# Patient Record
Sex: MALE | Race: WHITE | NOT HISPANIC OR LATINO | Employment: FULL TIME | URBAN - METROPOLITAN AREA
[De-identification: names, ages, dates, MRNs, and addresses within clinical notes are randomized per-mention and may not be internally consistent; named-entity substitution may affect disease eponyms.]

---

## 2017-04-17 ENCOUNTER — HOSPITAL ENCOUNTER (EMERGENCY)
Facility: HOSPITAL | Age: 42
Discharge: HOME | End: 2017-04-17
Attending: EMERGENCY MEDICINE
Payer: MEDICAID

## 2017-04-17 VITALS
DIASTOLIC BLOOD PRESSURE: 111 MMHG | RESPIRATION RATE: 18 BRPM | WEIGHT: 225 LBS | HEART RATE: 105 BPM | SYSTOLIC BLOOD PRESSURE: 220 MMHG | HEIGHT: 74 IN | BODY MASS INDEX: 28.88 KG/M2 | OXYGEN SATURATION: 95 % | TEMPERATURE: 100 F

## 2017-04-17 DIAGNOSIS — J06.9 VIRAL URI: Primary | ICD-10-CM

## 2017-04-17 DIAGNOSIS — R11.10 POST-TUSSIVE EMESIS: ICD-10-CM

## 2017-04-17 PROCEDURE — 25000242 PHARM REV CODE 250 ALT 637 W/ HCPCS: Performed by: EMERGENCY MEDICINE

## 2017-04-17 PROCEDURE — 94640 AIRWAY INHALATION TREATMENT: CPT

## 2017-04-17 PROCEDURE — 94760 N-INVAS EAR/PLS OXIMETRY 1: CPT

## 2017-04-17 PROCEDURE — 99283 EMERGENCY DEPT VISIT LOW MDM: CPT | Mod: 25

## 2017-04-17 PROCEDURE — 25000003 PHARM REV CODE 250: Performed by: EMERGENCY MEDICINE

## 2017-04-17 RX ORDER — IBUPROFEN 400 MG/1
800 TABLET ORAL
Status: COMPLETED | OUTPATIENT
Start: 2017-04-17 | End: 2017-04-17

## 2017-04-17 RX ORDER — ALBUTEROL SULFATE 2.5 MG/.5ML
2.5 SOLUTION RESPIRATORY (INHALATION)
Status: COMPLETED | OUTPATIENT
Start: 2017-04-17 | End: 2017-04-17

## 2017-04-17 RX ADMIN — ALBUTEROL SULFATE 2.5 MG: 2.5 SOLUTION RESPIRATORY (INHALATION) at 02:04

## 2017-04-17 RX ADMIN — IBUPROFEN 800 MG: 400 TABLET, FILM COATED ORAL at 02:04

## 2017-04-17 NOTE — ED PROVIDER NOTES
Chief complaint:  Fever; Vomiting; and Cough      HPI:  Augustine Boswell is a 41 y.o. male presenting with a one-week history of cough productive of yellow, nonbloody sputum accompanied by rhinorrhea and occasional posttussive emesis.  No diarrhea.  No chest or abdominal pain.  Denies smoking history.  No recent travel or sick contacts.  He does admit to IV drug use.    ROS: As per HPI and below:  No measured fever, rashes, swelling, hemoptysis, chest pain, syncope, or back pain.    Review of patient's allergies indicates:  No Known Allergies    Patient's Medications   New Prescriptions    No medications on file   Previous Medications    ALBUTEROL 90 MCG/ACTUATION INHALER    Inhale 1 puff into the lungs every 4 (four) hours as needed for Wheezing.    IBUPROFEN (ADVIL,MOTRIN) 600 MG TABLET    Take 1 tablet (600 mg total) by mouth every 6 (six) hours as needed.    OMEPRAZOLE (PRILOSEC) 20 MG CAPSULE    Take 20 mg by mouth once daily.   Modified Medications    No medications on file   Discontinued Medications    No medications on file       PMH:  As per HPI and below:  Past Medical History:   Diagnosis Date    Allergy     GERD (gastroesophageal reflux disease)      Past Surgical History:   Procedure Laterality Date    FRACTURE SURGERY      right hand       Social History     Social History    Marital status:      Spouse name: N/A    Number of children: N/A    Years of education: N/A     Social History Main Topics    Smoking status: Never Smoker    Smokeless tobacco: Current User    Alcohol use No    Drug use: Yes     Special: Methamphetamines, Cocaine, Heroin      Comment: Heroin-Gram/day has not used x 3 days    Sexual activity: Not Asked     Other Topics Concern    None     Social History Narrative       History reviewed. No pertinent family history.    Physical Exam:    Vitals:    04/17/17 0226   BP:    Pulse:    Resp:    Temp: 100.3 °F (37.9 °C)     GENERAL:  No apparent distress.  Alert.    HEENT:   Moist mucous membranes.  Normocephalic and atraumatic.    NECK:  No swelling.  Midline trachea.   CARDIOVASCULAR:  Regular rate and rhythm.  2+ radial pulses.  No murmurs auscultated.  No rubs.  PULMONARY:  Slight bilateral expiratory wheeze.  No rales or rhonchi.  No tachypnea or accessory muscle use.  There is slight restrictive quality to breathing with patient limited in deep inspiration.    ABDOMEN:  Non-tender and non-distended.    EXTREMITIES:  Warm and well perfused.  Brisk capillary refill.  No peripheral edema.    NEUROLOGICAL:  Normal mental status.  Appropriate and conversant.    SKIN:  No rashes or ecchymoses.    BACK:  No back tenderness to palpation.      Labs Reviewed - No data to display    Current Discharge Medication List      CONTINUE these medications which have NOT CHANGED    Details   albuterol 90 mcg/actuation inhaler Inhale 1 puff into the lungs every 4 (four) hours as needed for Wheezing.  Qty: 1 Inhaler, Refills: 1      ibuprofen (ADVIL,MOTRIN) 600 MG tablet Take 1 tablet (600 mg total) by mouth every 6 (six) hours as needed.  Qty: 20 tablet, Refills: 0      omeprazole (PRILOSEC) 20 MG capsule Take 20 mg by mouth once daily.             Orders Placed This Encounter   Procedures    X-Ray Chest PA And Lateral       Imaging Results     None          ED Course   Comment By Time   CXR:  Bronchial cuffing.  No confluent infiltrates or other acute disease. (my read) Miguel Ángel Hernandez MD 04/17 0250         MDM:    41 y.o. male wi upper respiratory symptoms for one week accompanied by low-grade temperature here.  Patient has no respiratory distress.  He had history of childhood asthma with no adult asthma noted.  Albuterol treatment given his request.  I do not think steroids are indicated.  Minimal wheezing here.  Chest x-ray done to rule out pathology such as pneumonia.  He does admit to IV drug use and review of systems but with no pattern to suggest other sequelae such as endocarditis.   I do not think further diagnostic testing is indicated.  I doubt sepsis.  Symptomatic treatment as needed recommended.  Follow-up with PCP for reassessment.  Detailed return precautions reviewed.      Diagnoses:    1. Viral URI     Miguel Ángel Hernandez MD  04/17/17 0256

## 2017-04-17 NOTE — ED AVS SNAPSHOT
[facility] [address]  [place] [address]               Augustine Boswell   2017  2:19 AM   ED    Description:  Male : 1975   Department:  [facility]           Your Care was Coordinated By:     Provider Role From To    Miguel Ángel Hernandez MD Attending Provider 17 0219 --      Reason for Visit     Fever     Vomiting     Cough           Diagnoses this Visit        Comments    Viral URI    -  Primary     Post-tussive emesis           ED Disposition     ED Disposition Condition Comment    Discharge             To Do List           Follow-up Information     Follow up with Darion Donahue MD.    Specialty:  Internal Medicine    Why:  or your regular PCP, 1 week    Contact information:    [address] [place] [address] Dr Carvalho Danna  [place] [address]  [phone]        [facility] On [facility]     [facility] Nurse Care Line -  Assistance  Unless otherwise directed by your provider, please contact [facility], our nurse care line that is available for  assistance.     Registered nurses in the [facility] provide: appointment scheduling, clinical advisement, health education, and other advisory services.  Call: [phone] (toll free)               Medications           Message regarding Medications     Verify the changes and/or additions to your medication regime listed below are the same as discussed with your clinician today.  If any of these changes or additions are incorrect, please notify your healthcare provider.        These medications were administered today        Dose Freq    ibuprofen tablet 800 mg 800 mg ED 1 Time    Sig: Take 2 tablets (800 mg total) by mouth ED 1 Time.    Class: Normal    Route: Oral    albuterol sulfate nebulizer solution 2.5 mg 2.5 mg ED 1 Time    Sig: Take 2.5 mg by nebulization ED 1 Time.    Class: Normal    Route: Nebulization           Verify that the below list of medications is an  "accurate representation of the medications you are currently taking.  If none reported, the list may be blank. If incorrect, please contact your healthcare provider. Carry this list with you in case of emergency.           Current Medications     albuterol 90 mcg/actuation inhaler Inhale 1 puff into the lungs every 4 (four) hours as needed for Wheezing.    albuterol sulfate nebulizer solution 2.5 mg Take 2.5 mg by nebulization ED 1 Time.    ibuprofen (ADVIL,MOTRIN) 600 MG tablet Take 1 tablet (600 mg total) by mouth every 6 (six) hours as needed.    omeprazole (PRILOSEC) 20 MG capsule Take 20 mg by mouth once daily.           Clinical Reference Information           Your Vitals Were     BP Pulse Temp Resp Height Weight    220/111 (BP Location: Right arm, Patient Position: Sitting) 105 100.3 °F (37.9 °C) 18 6' 2" (1.88 m) 102.1 kg (225 lb)    SpO2 BMI             95% 28.89 kg/m2         Allergies as of 4/17/2017     No Known Allergies      Immunizations Administered on Date of Encounter - 4/17/2017     None      ED Micro, Lab, POCT     None      ED Imaging Orders     Start Ordered       Status Ordering Provider    04/17/17 0230 04/17/17 0229  X-Ray Chest PA And Lateral  1 time imaging      In process       Discharge References/Attachments     URI, VIRAL, NO ABX (ADULT) (ENGLISH)      [facility] Sign-Up     Activating your [facility] account is as easy as 1-2-3!     1) Visit [email or web address], select Sign Up Now, enter this activation code and your date of birth, then select Next.  [email or web address]  Expires: 6/1/2017  2:55 AM      2) Create a username and password to use when you visit [facility] in the future and select a security question in case you lose your password and select Next.    3) Enter your e-mail address and click Sign Up!    Additional Information  If you have questions, please e-mail [email or web address] or call [phone] to talk to our [facility] staff. Remember, [facility] is NOT to be used for urgent " needs. For medical emergencies, dial 911.          [facility] complies with applicable Federal civil rights laws and does not discriminate on the basis of race, color, national origin, age, disability, or sex.        Language Assistance Services     ATTENTION: Language assistance services are available, free of charge. Please call [phone].      ATENCIÓN: Si habla español, tiene a atkinson disposición servicios gratuitos de asistencia lingüística. Llame al [phone].     EDU Ý: N?u b?n nói Ti?ng Vi?t, có các d?ch v? h? tr? ngôn ng? mi?n phí dành cho b?n. G?i s? [phone].

## 2020-06-27 ENCOUNTER — CLINICAL SUPPORT (OUTPATIENT)
Dept: URGENT CARE | Facility: CLINIC | Age: 45
End: 2020-06-27
Payer: MEDICAID

## 2020-06-27 DIAGNOSIS — U07.1 COVID-19: Primary | ICD-10-CM

## 2020-06-27 PROCEDURE — 99211 PR OFFICE/OUTPT VISIT, EST, LEVL I: ICD-10-PCS | Mod: S$GLB,,, | Performed by: INTERNAL MEDICINE

## 2020-06-27 PROCEDURE — U0003 INFECTIOUS AGENT DETECTION BY NUCLEIC ACID (DNA OR RNA); SEVERE ACUTE RESPIRATORY SYNDROME CORONAVIRUS 2 (SARS-COV-2) (CORONAVIRUS DISEASE [COVID-19]), AMPLIFIED PROBE TECHNIQUE, MAKING USE OF HIGH THROUGHPUT TECHNOLOGIES AS DESCRIBED BY CMS-2020-01-R: HCPCS

## 2020-06-27 PROCEDURE — 99211 OFF/OP EST MAY X REQ PHY/QHP: CPT | Mod: S$GLB,,, | Performed by: INTERNAL MEDICINE

## 2020-06-27 NOTE — LETTER
[address], [address] ? [place], [address] ? Phone [phone] ? Fax [phone]           Return to Work/School    Patient: Augustine Boswell  YOB: 1975   Date: 06/27/2020      To Whom It May Concern:     Augustine Boswell was in contact with/seen in my office on 06/27/2020. COVID-19 is present in our communities across the [facility]. Not all patients are eligible or appropriate to be tested. In this situation, your employee meets the following criteria:     Augustine Boswell has met the criteria for COVID-19 testing based upon symptoms, travel, and/or potential exposure. The test has been completed and is pending results at this time. During this time the employee is not able to work and should be quarantined per the Centers for Disease Control timelines.      If you have any questions or concerns, or if I can be of further assistance, please do not hesitate to contact me.     Sincerely,    NURSE URGENT CARE, [facility]

## 2020-07-02 ENCOUNTER — TELEPHONE (OUTPATIENT)
Dept: URGENT CARE | Facility: CLINIC | Age: 45
End: 2020-07-02

## 2020-07-02 LAB — SARS-COV-2 RNA RESP QL NAA+PROBE: NOT DETECTED

## 2020-07-02 NOTE — TELEPHONE ENCOUNTER
1/3 attempts    ----- Message from Marcello Samaniego MD sent at 7/2/2020  1:19 PM CDT -----  Please call the patient regarding  not detected result. Pt should still take precautions if there was a known exposure as testcould be a false negative result bc our test is currently only 80% accurate.  Should  practice social distancing and mask adherence if in close contact and in public to limit any future exposure. If they are to develop any symptoms, should seek medical care

## 2020-07-04 ENCOUNTER — TELEPHONE (OUTPATIENT)
Dept: URGENT CARE | Facility: CLINIC | Age: 45
End: 2020-07-04

## 2020-07-04 NOTE — TELEPHONE ENCOUNTER
3/3 [facility]      ----- Message from Marcello Samaniego MD sent at 7/2/2020  1:19 PM CDT -----  Please call the patient regarding  not detected result. Pt should still take precautions if there was a known exposure as testcould be a false negative result bc our test is currently only 80% accurate.  Should  practice social distancing and mask adherence if in close contact and in public to limit any future exposure. If they are to develop any symptoms, should seek medical care

## 2020-10-13 ENCOUNTER — OFFICE VISIT (OUTPATIENT)
Dept: FAMILY MEDICINE | Facility: CLINIC | Age: 45
End: 2020-10-13
Payer: MEDICAID

## 2020-10-13 VITALS
HEIGHT: 74 IN | BODY MASS INDEX: 30.38 KG/M2 | DIASTOLIC BLOOD PRESSURE: 98 MMHG | HEART RATE: 71 BPM | SYSTOLIC BLOOD PRESSURE: 138 MMHG | TEMPERATURE: 97 F | WEIGHT: 236.69 LBS | OXYGEN SATURATION: 98 %

## 2020-10-13 DIAGNOSIS — F41.9 ANXIETY: ICD-10-CM

## 2020-10-13 DIAGNOSIS — R03.0 ELEVATED BP WITHOUT DIAGNOSIS OF HYPERTENSION: ICD-10-CM

## 2020-10-13 DIAGNOSIS — Z00.00 HEALTHCARE MAINTENANCE: ICD-10-CM

## 2020-10-13 DIAGNOSIS — R76.8 HEPATITIS C ANTIBODY TEST POSITIVE: Primary | ICD-10-CM

## 2020-10-13 PROCEDURE — 99204 OFFICE O/P NEW MOD 45 MIN: CPT | Mod: S$PBB,,, | Performed by: FAMILY MEDICINE

## 2020-10-13 PROCEDURE — 99204 OFFICE O/P NEW MOD 45 MIN: CPT | Performed by: FAMILY MEDICINE

## 2020-10-13 PROCEDURE — 99204 PR OFFICE/OUTPT VISIT, NEW, LEVL IV, 45-59 MIN: ICD-10-PCS | Mod: S$PBB,,, | Performed by: FAMILY MEDICINE

## 2020-10-13 NOTE — PROGRESS NOTES
"  SUBJECTIVE:    Patient ID: Augustine Boswell is a 45 y.o. male.    Chief Complaint: Establish Care (Pt states that he had labs 5 months ago in [place], [place] with Dr. Naye Rodriguez. He was then told that it's possible that he has Hepatitis C.)    Pleasant 45-year-old male presents today to establish care.  He has a past medical history of hypertension, positive Hep C ab, and polysubstance IV drug use.  Patient states that he has been in and out of longterm for various infractions.  Was recently discharged from a substance abuse rehab facility in [place] about 5 months ago.  He has been doing well since his discharge.  He has been having some trouble establishing with primary care physician as providers have thought he has been seeking pain medication (d/t previous elbow pain).  Had positive hepatitis-C antibody test in 2016 and would like to establish with hepatitis specialist.  States most recent hepatitis screening was negative (will obtain lab results).  Agreeable for repeat hepatitis screening and referral to hepatologist today.     Today, patient is visibly anxious.  Does give history of anxiety, especially social anxiety.  States having difficulty readjusting into society; "unsure of how to act".  Reports that when he consistently exercises his mood is better. Encouraged that he do so consistently for better control of his blood pressure and anxiety.  Discussed Mediterranean diet.  Also discussed possibly started anxiety medication and referral to talk therapist.  Patient would like to next initiate a consistent exercise regimen and adjust his eating eating habits before exploring other methods.  Sleeps well with melatonin.     Blood pressure continues to be elevated on recheck.  Patient states that he was previously on lisinopril for blood pressure control but stopped it due to medication making him sluggish.  Instructed patient to obtain a blood pressure cuff, monitor a.m. pressures, keep a log and bring log " next visit for evaluation.  Patient verbally expresses understanding.  All questions and concerns addressed. Encouraged smoking cessation.             Clinical Support on 06/27/2020   Component Date Value Ref Range Status    SARS-CoV2 (COVID-19) Qualitative P* 06/27/2020 Not Detected  Not Detected Final       Past Medical History:   Diagnosis Date    Allergy     Asthma     GERD (gastroesophageal reflux disease)      Social History     Socioeconomic History    Marital status:      Spouse name: Not on file    Number of children: Not on file    Years of education: Not on file    Highest education level: Not on file   Occupational History    Not on file   Social Needs    Financial resource strain: Not on file    Food insecurity     Worry: Not on file     Inability: Not on file    Transportation needs     Medical: Not on file     Non-medical: Not on file   Tobacco Use    Smoking status: Current Every Day Smoker    Smokeless tobacco: Current User     Types: Chew   Substance and Sexual Activity    Alcohol use: No    Drug use: Not Currently     Types: Methamphetamines, Cocaine, Heroin     Comment: Heroin-Gram/day has not used x 3 days    Sexual activity: Not Currently   Lifestyle    Physical activity     Days per week: Not on file     Minutes per session: Not on file    Stress: Not on file   Relationships    Social connections     Talks on phone: Not on file     Gets together: Not on file     Attends [employer] service: Not on file     Active member of club or organization: Not on file     Attends meetings of clubs or organizations: Not on file     Relationship status: Not on file   Other Topics Concern    Not on file   Social History Narrative    Not on file     Past Surgical History:   Procedure Laterality Date    FRACTURE SURGERY      right hand     History reviewed. No pertinent family history.    Review of patient's allergies indicates:  No Known Allergies  No current outpatient medications  "on file.    Review of Systems   Constitutional: Negative for activity change, chills, fatigue and fever.   HENT: Negative for congestion, postnasal drip, sinus pressure and sneezing.    Respiratory: Negative for shortness of breath and wheezing.    Cardiovascular: Negative for chest pain, palpitations and leg swelling.   Gastrointestinal: Negative for constipation, diarrhea, nausea and vomiting.   Genitourinary: Negative for difficulty urinating, frequency, hematuria and urgency.   Musculoskeletal: Negative for arthralgias.   Skin: Negative for wound.   Neurological: Negative for headaches.   Psychiatric/Behavioral: The patient is nervous/anxious.           Objective:      Vitals:    10/13/20 0751   BP: (!) 138/98   Pulse: 71   Temp: 97.2 °F (36.2 °C)   SpO2: 98%   Weight: 107.4 kg (236 lb 11.2 oz)   Height: 6' 2" (1.88 m)     Physical Exam  Vitals signs and nursing note reviewed.   Constitutional:       General: He is not in acute distress.     Appearance: Normal appearance. He is diaphoretic. He is not toxic-appearing.   HENT:      Head: Normocephalic and atraumatic.      Right Ear: Tympanic membrane and external ear normal.      Left Ear: Tympanic membrane and external ear normal.   Eyes:      General: No scleral icterus.        Right eye: No discharge.         Left eye: No discharge.      Extraocular Movements: Extraocular movements intact.      Conjunctiva/sclera: Conjunctivae normal.      Pupils: Pupils are equal, round, and reactive to light.   Neck:      Musculoskeletal: Normal range of motion.   Cardiovascular:      Rate and Rhythm: Normal rate and regular rhythm.      Pulses: Normal pulses.      Heart sounds: Normal heart sounds. No murmur.   Pulmonary:      Effort: Pulmonary effort is normal. No respiratory distress.      Breath sounds: Normal breath sounds. No decreased breath sounds or wheezing.   Abdominal:      General: Bowel sounds are normal. There is no distension.      Palpations: Abdomen is soft. "      Tenderness: There is no abdominal tenderness. There is no guarding or rebound.   Musculoskeletal: Normal range of motion.   Skin:     General: Skin is warm.   Neurological:      General: No focal deficit present.      Mental Status: He is alert and oriented to person, place, and time.      Motor: No tremor.   Psychiatric:         Attention and Perception: Attention normal.         Mood and Affect: Mood is anxious.         Behavior: Behavior normal.         Thought Content: Thought content normal.           Assessment:       1. Hepatitis C antibody test positive    2. Elevated BP without diagnosis of hypertension    3. Anxiety    4. Healthcare maintenance         Plan:       Hepatitis C antibody test positive  Comments:  Repeat Hep C screening  Refer to Hepatitis clinic  Orders:  -     Ambulatory referral/consult to Hepatitis C Clinic; Future; Expected date: 10/20/2020    Elevated BP without diagnosis of hypertension  Comments:  Lifestyle modifications  Obtain BP cuff, monitor, and bring log at next visit  Consider restarting BP meds if pressure remains elevated    Anxiety  Comments:  TSH  Lifestyle modifications  Given numbers for local talk therapists    Healthcare maintenance  -     Lipid Panel; Future; Expected date: 10/14/2020  -     Comprehensive Metabolic Panel; Future; Expected date: 10/14/2020  -     CBC auto differential; Future; Expected date: 10/14/2020  -     Cancel: TSH; Future; Expected date: 10/13/2020      Follow up in about 4 weeks (around 11/10/2020) for HTN, anxiety .        10/13/2020 Elizabeth Martel M.D.

## 2020-10-13 NOTE — PATIENT INSTRUCTIONS
Fasting labs     Get BP cuff- monitor BP and bring log at next visit     Referral to Hep specialist         Mental Health Specialists:    [facility]        [address], [address], [place] [address] [phone]             [facility]       [address], [place] [address] [phone]      [facility]  [address], [place] [address]         Phone: [phone]                [facility]         [address], [address], [place] [address] ([facility])         Phone: [phone]             [facility]         [address], [place] [address]         Phone: [phone]

## 2020-10-16 LAB
ALBUMIN SERPL-MCNC: 4.7 G/DL (ref 4–5)
ALBUMIN/GLOB SERPL: 1.8 {RATIO} (ref 1.2–2.2)
ALP SERPL-CCNC: 65 IU/L (ref 39–117)
ALT SERPL-CCNC: 50 IU/L (ref 0–44)
AST SERPL-CCNC: 35 IU/L (ref 0–40)
BASOPHILS # BLD AUTO: 0 X10E3/UL (ref 0–0.2)
BASOPHILS NFR BLD AUTO: 1 %
BILIRUB SERPL-MCNC: 0.4 MG/DL (ref 0–1.2)
BUN SERPL-MCNC: 15 MG/DL (ref 6–24)
BUN/CREAT SERPL: 13 (ref 9–20)
CALCIUM SERPL-MCNC: 9.7 MG/DL (ref 8.7–10.2)
CHLORIDE SERPL-SCNC: 104 MMOL/L (ref 96–106)
CHOLEST SERPL-MCNC: 135 MG/DL (ref 100–199)
CO2 SERPL-SCNC: 23 MMOL/L (ref 20–29)
CREAT SERPL-MCNC: 1.17 MG/DL (ref 0.76–1.27)
EOSINOPHIL # BLD AUTO: 0.1 X10E3/UL (ref 0–0.4)
EOSINOPHIL NFR BLD AUTO: 3 %
ERYTHROCYTE [DISTWIDTH] IN BLOOD BY AUTOMATED COUNT: 13.8 % (ref 11.6–15.4)
GLOBULIN SER CALC-MCNC: 2.6 G/DL (ref 1.5–4.5)
GLUCOSE SERPL-MCNC: 98 MG/DL (ref 65–99)
HCT VFR BLD AUTO: 53.8 % (ref 37.5–51)
HCV AB S/CO SERPL IA: >11 S/CO RATIO (ref 0–0.9)
HDLC SERPL-MCNC: 38 MG/DL
HGB BLD-MCNC: 17.2 G/DL (ref 13–17.7)
IMM GRANULOCYTES # BLD AUTO: 0 X10E3/UL (ref 0–0.1)
IMM GRANULOCYTES NFR BLD AUTO: 0 %
LDLC SERPL CALC-MCNC: 79 MG/DL (ref 0–99)
LYMPHOCYTES # BLD AUTO: 1.4 X10E3/UL (ref 0.7–3.1)
LYMPHOCYTES NFR BLD AUTO: 28 %
MCH RBC QN AUTO: 29 PG (ref 26.6–33)
MCHC RBC AUTO-ENTMCNC: 32 G/DL (ref 31.5–35.7)
MCV RBC AUTO: 91 FL (ref 79–97)
MONOCYTES # BLD AUTO: 0.5 X10E3/UL (ref 0.1–0.9)
MONOCYTES NFR BLD AUTO: 9 %
NEUTROPHILS # BLD AUTO: 3.2 X10E3/UL (ref 1.4–7)
NEUTROPHILS NFR BLD AUTO: 59 %
PLATELET # BLD AUTO: 221 X10E3/UL (ref 150–450)
POTASSIUM SERPL-SCNC: 4.9 MMOL/L (ref 3.5–5.2)
PROT SERPL-MCNC: 7.3 G/DL (ref 6–8.5)
RBC # BLD AUTO: 5.94 X10E6/UL (ref 4.14–5.8)
SODIUM SERPL-SCNC: 140 MMOL/L (ref 134–144)
TRIGL SERPL-MCNC: 96 MG/DL (ref 0–149)
TSH SERPL DL<=0.005 MIU/L-ACNC: 0.88 UIU/ML (ref 0.45–4.5)
VLDLC SERPL CALC-MCNC: 18 MG/DL (ref 5–40)
WBC # BLD AUTO: 5.2 X10E3/UL (ref 3.4–10.8)

## 2020-10-19 ENCOUNTER — PATIENT MESSAGE (OUTPATIENT)
Dept: FAMILY MEDICINE | Facility: CLINIC | Age: 45
End: 2020-10-19

## 2020-10-20 ENCOUNTER — DOCUMENTATION ONLY (OUTPATIENT)
Dept: TRANSPLANT | Facility: CLINIC | Age: 45
End: 2020-10-20

## 2020-10-20 ENCOUNTER — TELEPHONE (OUTPATIENT)
Dept: TRANSPLANT | Facility: CLINIC | Age: 45
End: 2020-10-20

## 2020-10-20 NOTE — LETTER
October 20, 2020    Betito Tamayo [address] [place] [address]      Dear Betito Boswell:    Your doctor has referred you to the [facility]. We are sending this letter to remind you to make an appointment with us to complete the referral process.     Please call us at [phone] to schedule an appointment. We look forward to seeing you soon.     If you received a call and have been scheduled, please disregard this letter.       Sincerely,        [facility]   [address] [place] [address]  [phone]

## 2020-10-20 NOTE — NURSING
Pt records reviewed.   Pt will be referred to Hepatitis C clinic  Hep C antibodies  Positive   Initial referral received  from the workque.   Referring Provider/diagnosis  Elizabeth Martel MD    Referral letter sent to  patient.

## 2020-10-20 NOTE — TELEPHONE ENCOUNTER
----- Message from Lesly Costello sent at 10/20/2020  9:51 AM CDT -----  Contact: pt    Pt chart sent to nurse for review.     .       ----- Message -----  From: Rhonda Avila MA  Sent: 10/20/2020   9:41 AM CDT  [facility]: [facility]      ----- Message -----  From: Sherlyn Woodard  Sent: 10/20/2020   9:26 AM CDT  To: [facility] Hep[facility] Clinical Staff    Pt is calling to schedule new patient appt, referral in  system         Please contact pt [phone]

## 2020-10-21 ENCOUNTER — TELEPHONE (OUTPATIENT)
Dept: HEPATOLOGY | Facility: CLINIC | Age: 45
End: 2020-10-21

## 2020-10-21 NOTE — TELEPHONE ENCOUNTER
Pt being referred to HCV clinic by Elizabeth Martel MD. Spoke with pt, appointment scheduled as requested. Reminder notices mailed to pt as well.

## 2020-10-21 NOTE — TELEPHONE ENCOUNTER
----- Message from Joslyn Galan LPN sent at 10/20/2020  2:00 PM CDT -----  Patient last labs are from 2016 with no noted history of treatment in the chart.  Please review.patient's chart.  Thanks              Pt records reviewed.   Pt will be referred to Hepatitis C clinic  Hep C antibodies  Positive   Initial referral received  from the workque.   Referring Provider/diagnosis  Elizabeth Martel MD

## 2020-10-22 ENCOUNTER — PATIENT MESSAGE (OUTPATIENT)
Dept: FAMILY MEDICINE | Facility: CLINIC | Age: 45
End: 2020-10-22

## 2020-10-22 RX ORDER — LISINOPRIL 10 MG/1
10 TABLET ORAL DAILY
Qty: 30 TABLET | Refills: 0 | Status: SHIPPED | OUTPATIENT
Start: 2020-10-22 | End: 2020-11-17 | Stop reason: SDUPTHER

## 2020-10-23 ENCOUNTER — PATIENT MESSAGE (OUTPATIENT)
Dept: FAMILY MEDICINE | Facility: CLINIC | Age: 45
End: 2020-10-23

## 2020-10-26 ENCOUNTER — PATIENT MESSAGE (OUTPATIENT)
Dept: FAMILY MEDICINE | Facility: CLINIC | Age: 45
End: 2020-10-26

## 2020-11-02 ENCOUNTER — PATIENT MESSAGE (OUTPATIENT)
Dept: HEPATOLOGY | Facility: CLINIC | Age: 45
End: 2020-11-02

## 2020-11-02 ENCOUNTER — OFFICE VISIT (OUTPATIENT)
Dept: HEPATOLOGY | Facility: CLINIC | Age: 45
End: 2020-11-02
Payer: MEDICAID

## 2020-11-02 DIAGNOSIS — B18.2 CHRONIC HEPATITIS C WITHOUT HEPATIC COMA: Primary | ICD-10-CM

## 2020-11-02 DIAGNOSIS — R74.8 ABNORMAL TRANSAMINASES: ICD-10-CM

## 2020-11-02 PROCEDURE — 99203 OFFICE O/P NEW LOW 30 MIN: CPT | Mod: 95,,, | Performed by: PHYSICIAN ASSISTANT

## 2020-11-02 PROCEDURE — 99203 PR OFFICE/OUTPT VISIT, NEW, LEVL III, 30-44 MIN: ICD-10-PCS | Mod: 95,,, | Performed by: PHYSICIAN ASSISTANT

## 2020-11-02 NOTE — Clinical Note
Pls schedule labs, u/s, fibroscan however it works in schedule. Ideally labs ~ 1 week before visit.  thanks

## 2020-11-02 NOTE — LETTER
November 2, 2020      Elizabeth Martel MD  [address]  [address]  [facility] [address]           [facility] 1st Fl  [address]  [facility] [address]  Phone: [phone]  Fax: [phone]          Patient: Augustine Boswell   MR Number: [id number]   YOB: 1975   Date of Visit: 11/2/2020       Dear Dr. Elizabeth Martel:    Thank you for referring Augustine Boswell to me for evaluation. Attached you will find relevant portions of my assessment and plan of care.    If you have questions, please do not hesitate to call me. I look forward to following Augustine Boswell along with you.    Sincerely,    Jennifer B. Scheuermann, PA    Enclosure  CC:  No Recipients    If you would like to receive this communication electronically, please contact [email or web address] or [phone] to request more information on [employer] access.    For providers and/or their staff who would like to refer a patient to [facility], please contact us through our one-stop-shop provider referral line, [facility], at [phone].    If you feel you have received this communication in error or would no longer like to receive these types of communications, please e-mail [email or web address]
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
## 2020-11-03 ENCOUNTER — TELEPHONE (OUTPATIENT)
Dept: HEPATOLOGY | Facility: CLINIC | Age: 45
End: 2020-11-03

## 2020-11-03 NOTE — TELEPHONE ENCOUNTER
----- Message from Jennifer B. Scheuermann, PA sent at 11/2/2020  3:41 PM CST -----  Pls schedule labs, u/s, fibroscan however it works in schedule. Ideally labs ~ 1 week before visit.thanks

## 2020-11-05 ENCOUNTER — HOSPITAL ENCOUNTER (OUTPATIENT)
Dept: RADIOLOGY | Facility: HOSPITAL | Age: 45
Discharge: HOME | End: 2020-11-05
Attending: PHYSICIAN ASSISTANT
Payer: MEDICAID

## 2020-11-05 DIAGNOSIS — R74.8 ABNORMAL TRANSAMINASES: ICD-10-CM

## 2020-11-05 DIAGNOSIS — B18.2 CHRONIC HEPATITIS C WITHOUT HEPATIC COMA: ICD-10-CM

## 2020-11-05 PROCEDURE — 76700 US EXAM ABDOM COMPLETE: CPT | Mod: 26,,, | Performed by: RADIOLOGY

## 2020-11-05 PROCEDURE — 76700 US EXAM ABDOM COMPLETE: CPT | Mod: TC

## 2020-11-05 PROCEDURE — 76700 US ABDOMEN COMPLETE: ICD-10-PCS | Mod: 26,,, | Performed by: RADIOLOGY

## 2020-11-16 ENCOUNTER — OFFICE VISIT (OUTPATIENT)
Dept: HEPATOLOGY | Facility: CLINIC | Age: 45
End: 2020-11-16
Payer: MEDICAID

## 2020-11-16 ENCOUNTER — PROCEDURE VISIT (OUTPATIENT)
Dept: HEPATOLOGY | Facility: CLINIC | Age: 45
End: 2020-11-16
Payer: MEDICAID

## 2020-11-16 VITALS
OXYGEN SATURATION: 96 % | HEART RATE: 64 BPM | HEIGHT: 74 IN | WEIGHT: 226.88 LBS | SYSTOLIC BLOOD PRESSURE: 139 MMHG | DIASTOLIC BLOOD PRESSURE: 93 MMHG | BODY MASS INDEX: 29.12 KG/M2

## 2020-11-16 DIAGNOSIS — R74.8 ABNORMAL TRANSAMINASES: ICD-10-CM

## 2020-11-16 DIAGNOSIS — B18.2 CHRONIC HEPATITIS C WITHOUT HEPATIC COMA: Primary | ICD-10-CM

## 2020-11-16 DIAGNOSIS — B18.2 CHRONIC HEPATITIS C WITHOUT HEPATIC COMA: ICD-10-CM

## 2020-11-16 DIAGNOSIS — K21.9 GASTROESOPHAGEAL REFLUX DISEASE, UNSPECIFIED WHETHER ESOPHAGITIS PRESENT: ICD-10-CM

## 2020-11-16 DIAGNOSIS — K76.9 LIVER LESION: ICD-10-CM

## 2020-11-16 PROCEDURE — 99213 OFFICE O/P EST LOW 20 MIN: CPT | Mod: PBBFAC,25 | Performed by: PHYSICIAN ASSISTANT

## 2020-11-16 PROCEDURE — 99213 OFFICE O/P EST LOW 20 MIN: CPT | Mod: S$PBB,,, | Performed by: PHYSICIAN ASSISTANT

## 2020-11-16 PROCEDURE — 99999 PR PBB SHADOW E&M-EST. PATIENT-LVL III: CPT | Mod: PBBFAC,,, | Performed by: PHYSICIAN ASSISTANT

## 2020-11-16 PROCEDURE — 91200 FIBROSCAN (VIBRATION CONTROLLED TRANSIENT ELASTOGRAPHY): ICD-10-PCS | Mod: 26,S$PBB,, | Performed by: PHYSICIAN ASSISTANT

## 2020-11-16 PROCEDURE — 91200 LIVER ELASTOGRAPHY: CPT | Mod: 26,S$PBB,, | Performed by: PHYSICIAN ASSISTANT

## 2020-11-16 PROCEDURE — 91200 LIVER ELASTOGRAPHY: CPT | Mod: PBBFAC | Performed by: PHYSICIAN ASSISTANT

## 2020-11-16 PROCEDURE — 99213 PR OFFICE/OUTPT VISIT, EST, LEVL III, 20-29 MIN: ICD-10-PCS | Mod: S$PBB,,, | Performed by: PHYSICIAN ASSISTANT

## 2020-11-16 PROCEDURE — 99999 PR PBB SHADOW E&M-EST. PATIENT-LVL III: ICD-10-PCS | Mod: PBBFAC,,, | Performed by: PHYSICIAN ASSISTANT

## 2020-11-16 RX ORDER — VELPATASVIR AND SOFOSBUVIR 100; 400 MG/1; MG/1
1 TABLET, FILM COATED ORAL DAILY
Qty: 28 TABLET | Refills: 2 | Status: SHIPPED | OUTPATIENT
Start: 2020-11-16 | End: 2021-06-24

## 2020-11-16 RX ORDER — OMEPRAZOLE 20 MG/1
20 CAPSULE, DELAYED RELEASE ORAL DAILY
COMMUNITY

## 2020-11-16 RX ORDER — TRAMADOL HYDROCHLORIDE 50 MG/1
50 TABLET ORAL EVERY 6 HOURS
COMMUNITY
End: 2021-06-24
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
## 2020-11-16 NOTE — PROCEDURES
FibroScan (Vibration Controlled Transient Elastography)    Date/Time: 11/16/2020 8:15 AM  Performed by: Jennifer B. Scheuermann, PA  Authorized by: Jennifer B. Scheuermann, PA     Diagnosis:  HCV    Probe:  M    Universal Protocol: Patient's identity, procedure and site were verified, confirmatory pause was performed.  Discussed procedure including risks and potential complications.  Questions answered.  Patient verbalizes understanding and wishes to proceed with VCTE.     Procedure: After providing explanations of the procedure, patient was placed in the supine position with right arm in maximum abduction to allow optimal exposure of right lateral abdomen.  Patient was briefly assessed, Testing was performed in the mid-axillary location, 50Hz Shear Wave pulses were applied and the resulting Shear Wave and Propagation Speed detected with a 3.5 MHz ultrasonic signal, using the FibroScan probe, Skin to liver capsule distance and liver parenchyma were accessed during the entire examination with the FibroScan probe, Patient was instructed to breathe normally and to abstain from sudden movements during the procedure, allowing for random measurements of liver stiffness. At least 10 Shear Waves were produced, Individual measurements of each Shear Wave were calculated.  Patient tolerated the procedure well with no complications.  Meets discharge criteria as was dismissed.  Rates pain 0 out of 10.  Patient will follow up with ordering provider to review results.      Findings  Median liver stiffness score:  5.8  CAP Reading: dB/m:  222    IQR/med %:  9  Interpretation  Fibrosis interpretation is based on medial liver stiffness - Kilopascal (kPa).    Fibrosis Stage:  F 0-1  Steatosis interpretation is based on controlled attenuation parameter - (dB/m).    Steatosis Grade:  <S1

## 2020-11-17 ENCOUNTER — OFFICE VISIT (OUTPATIENT)
Dept: FAMILY MEDICINE | Facility: CLINIC | Age: 45
End: 2020-11-17
Payer: MEDICAID

## 2020-11-17 ENCOUNTER — PATIENT MESSAGE (OUTPATIENT)
Dept: FAMILY MEDICINE | Facility: CLINIC | Age: 45
End: 2020-11-17

## 2020-11-17 ENCOUNTER — TELEPHONE (OUTPATIENT)
Dept: FAMILY MEDICINE | Facility: CLINIC | Age: 45
End: 2020-11-17

## 2020-11-17 VITALS
TEMPERATURE: 97 F | WEIGHT: 227.19 LBS | HEART RATE: 66 BPM | OXYGEN SATURATION: 98 % | HEIGHT: 74 IN | BODY MASS INDEX: 29.16 KG/M2 | DIASTOLIC BLOOD PRESSURE: 94 MMHG | SYSTOLIC BLOOD PRESSURE: 142 MMHG

## 2020-11-17 DIAGNOSIS — R74.01 TRANSAMINITIS: ICD-10-CM

## 2020-11-17 DIAGNOSIS — I10 ESSENTIAL HYPERTENSION: Primary | ICD-10-CM

## 2020-11-17 DIAGNOSIS — E66.3 OVERWEIGHT (BMI 25.0-29.9): ICD-10-CM

## 2020-11-17 DIAGNOSIS — F41.9 ANXIETY: ICD-10-CM

## 2020-11-17 DIAGNOSIS — B18.2 CHRONIC HEPATITIS C WITHOUT HEPATIC COMA: ICD-10-CM

## 2020-11-17 PROCEDURE — 99214 OFFICE O/P EST MOD 30 MIN: CPT | Mod: S$PBB,,, | Performed by: FAMILY MEDICINE

## 2020-11-17 PROCEDURE — 99214 PR OFFICE/OUTPT VISIT, EST, LEVL IV, 30-39 MIN: ICD-10-PCS | Mod: S$PBB,,, | Performed by: FAMILY MEDICINE

## 2020-11-17 PROCEDURE — 99214 OFFICE O/P EST MOD 30 MIN: CPT | Performed by: FAMILY MEDICINE

## 2020-11-17 RX ORDER — LISINOPRIL 10 MG/1
20 TABLET ORAL DAILY
Qty: 60 TABLET | Refills: 0 | Status: SHIPPED | OUTPATIENT
Start: 2020-11-17 | End: 2020-11-17

## 2020-11-17 RX ORDER — IBUPROFEN 200 MG
200 TABLET ORAL EVERY 6 HOURS PRN
COMMUNITY
End: 2023-05-03

## 2020-11-17 RX ORDER — BUSPIRONE HYDROCHLORIDE 10 MG/1
10 TABLET ORAL 2 TIMES DAILY
Qty: 60 TABLET | Refills: 1 | Status: SHIPPED | OUTPATIENT
Start: 2020-11-17 | End: 2020-12-21 | Stop reason: SDUPTHER

## 2020-11-17 RX ORDER — LISINOPRIL 20 MG/1
20 TABLET ORAL DAILY
Qty: 90 TABLET | Refills: 3 | Status: SHIPPED | OUTPATIENT
Start: 2020-11-17 | End: 2021-03-09 | Stop reason: SDUPTHER

## 2020-11-17 NOTE — TELEPHONE ENCOUNTER
Pharmacy called requesting to increase the Lisinopril to 20 mg instead of taking 2  10 mg daily. The insurance will not cover taking it twice a day.

## 2020-11-19 ENCOUNTER — SPECIALTY PHARMACY (OUTPATIENT)
Dept: PHARMACY | Facility: CLINIC | Age: 45
End: 2020-11-19

## 2020-11-20 ENCOUNTER — PATIENT MESSAGE (OUTPATIENT)
Dept: FAMILY MEDICINE | Facility: CLINIC | Age: 45
End: 2020-11-20

## 2020-11-23 ENCOUNTER — PATIENT MESSAGE (OUTPATIENT)
Dept: FAMILY MEDICINE | Facility: CLINIC | Age: 45
End: 2020-11-23

## 2020-11-30 ENCOUNTER — TELEPHONE (OUTPATIENT)
Dept: HEPATOLOGY | Facility: CLINIC | Age: 45
End: 2020-11-30

## 2020-12-03 ENCOUNTER — PATIENT MESSAGE (OUTPATIENT)
Dept: FAMILY MEDICINE | Facility: CLINIC | Age: 45
End: 2020-12-03

## 2020-12-03 ENCOUNTER — PATIENT MESSAGE (OUTPATIENT)
Dept: HEPATOLOGY | Facility: CLINIC | Age: 45
End: 2020-12-03

## 2020-12-03 ENCOUNTER — SPECIALTY PHARMACY (OUTPATIENT)
Dept: PHARMACY | Facility: CLINIC | Age: 45
End: 2020-12-03

## 2020-12-03 DIAGNOSIS — B18.2 CHRONIC HEPATITIS C WITHOUT HEPATIC COMA: Primary | ICD-10-CM

## 2020-12-03 DIAGNOSIS — R53.83 FATIGUE, UNSPECIFIED TYPE: Primary | ICD-10-CM

## 2020-12-03 NOTE — TELEPHONE ENCOUNTER
Specialty Pharmacy - Initial Clinical Assessment    Specialty Medication Orders Linked to Encounter      Most Recent Value   Medication #1  sofosbuvir-velpatasvir 400-100 mg Tab (Order#[id number], Rx#[id number])        Lazarus Boswell is a 45 y.o. male, who is followed by the specialty pharmacy service for management and education.    Recent Encounters     Date Type Provider Description    12/03/2020 Specialty Pharmacy Samantha Salazar PharmD Initial Clinical Assessment    11/27/2020 Specialty Pharmacy Sundar Holley     11/19/2020 Specialty Pharmacy Renea Palencia PharmD Referral Authorization        Clinical call attempts since last clinical assessment   No call attempts found.     Today he received education before his first fill with [facility].    Current Outpatient Medications   Medication Sig    sofosbuvir-velpatasvir 400-100 mg Tab Take 1 tablet by mouth once daily.    busPIRone (BUSPAR) 10 MG tablet Take 1 tablet (10 mg total) by mouth 2 (two) times daily.    ibuprofen (ADVIL,MOTRIN) 200 MG tablet Take 200 mg by mouth every 6 (six) hours as needed for Pain.    lisinopriL (PRINIVIL,ZESTRIL) 20 MG tablet Take 1 tablet (20 mg total) by mouth once daily.    multivit-mins no.63/iron/folic (M-VIT ORAL) Take by mouth once daily.    omeprazole (PRILOSEC) 20 MG capsule Take 20 mg by mouth once daily. PRN GERD    traMADoL (ULTRAM) 50 mg tablet Take 50 mg by mouth every 6 (six) hours.   Last reviewed on 12/3/2020  1:41 PM by Samantha Salazar PharmD    Review of patient's allergies indicates:  No Known AllergiesLast reviewed on  11/17/2020 7:38 AM by Arnoldo Ritter    Drug Interactions    Drug interactions evaluated: yes  Clinically relevant drug interactions identified: yes   Interactions list: Omeprazole and Epclusa   Drug management plan: PPI not recommended. Pt will HOLD Omeprazole while on HCV treatment. Pt will use TUMS  from Epclusa by 4 hours, if needed for  heartburn.     Provided the patient with educational material regarding drug interactions: not applicable         Adverse Effects    *All other systems reviewed and are negative       Assessment Questions - Documented Responses      Most Recent Value   Assessment   Medication Reconciliation completed for patient  Yes   During the past 4 weeks, has patient missed any activities due to condition or medication?  No   During the past 4 weeks, did patient have any of the following urgent care visits?  None   Goals of Therapy Status  Discussed (new start)   Welcome packet contents reviewed and discussed with patient?  Yes   Assesment completed?  Yes   Plan  Therapy being initiated   Do you need to open a clinical intervention (i-vent)?  No   Do you want to schedule first shipment?  Yes   Medication #1 Assessment Info   Patient status  New medication   Is this medication appropriate for the patient?  Yes   Is this medication effective?  Not yet started        Refill Questions - Documented Responses      Most Recent Value   Relationship to patient of person spoken to?  Self   HIPAA/medical authority confirmed?  Yes   Can the patient store medication/sharps container properly (at the correct temperature, away from children/pets, etc.)?  Yes   Can the patient call emergency services (911) in the event of an emergency?  Yes   Does the patient have any concerns or questions about taking or administering this medication as prescribed?  No   During the past 4 weeks, has patient missed any activities due to condition or medication?  No   During the past 4 weeks, did patient have any of the following urgent care visits?  None   How will the patient receive the medication?  Mail   When does the patient need to receive the medication?  12/05/20   Shipping Address  Home   Address in [facility] confirmed and updated if neccessary?  Yes   Expected Copay ($)  0   Is the patient able to afford the medication copay?  Yes   Payment  "Method  zero copay   Days supply of Refill  28   Refill activity completed?  Yes   Refill activity plan  Refill scheduled   Shipment/Pickup Date:  12/03/20          Objective    He has a past medical history of Allergy, Asthma, Chronic hepatitis C, and GERD (gastroesophageal reflux disease).      BP Readings from Last 4 Encounters:   11/17/20 (!) 142/94   11/16/20 (!) 139/93   10/13/20 (!) 138/98   04/17/17 (!) 220/111     Ht Readings from Last 4 Encounters:   11/17/20 6' 2" (1.88 m)   11/16/20 6' 2" (1.88 m)   10/13/20 6' 2" (1.88 m)   04/17/17 6' 2" (1.88 m)     Wt Readings from Last 4 Encounters:   11/17/20 103.1 kg (227 lb 3.2 oz)   11/16/20 102.9 kg (226 lb 13.7 oz)   10/13/20 107.4 kg (236 lb 11.2 oz)   04/17/17 102.1 kg (225 lb)     No results found for: HCVGENOTYPE  Recent Labs   Lab Result Units 11/05/20  0815 10/15/20  0624   Creatinine mg/dL  --  1.17   ALT IU/L  --  50 H   AST IU/L  --  35   HCV, Qualitative IU/mL DETECTED A  --    HCV RNA Quant PCR IU/mL 476,355 H  --    Hepatitis B Surface Ag  Negative  --    Hep B S Ab  Positive A  --    Hep B Core Total Ab  Positive A  --      The goals of Hepatitis C Virus (HCV) infection treatment include:  · Reducing all-cause mortality and liver-related health adverse consequences, including cirrhosis, end-stage liver disease, and hepatocellular carcinoma  · Achieving virologic cure as evidenced by a sustained virologic response  · Improving or maintaining quality of life  · Maintaining optimal therapy adherence  · Minimizing and managing side effects  · Preventing the transmission of HCV      Goals of Therapy Status: Discussed (new start)    Assessment/Plan  Patient plans to start therapy on 12/05/20      Indication, dosage, appropriateness, effectiveness, safety and convenience of his specialty medication(s) were reviewed today.     Patient Counseling    Counseled the patient on the following: doses and administration discussed, safe handling, storage, and " disposal discussed, possible adverse effects and management discussed, possible drug and prescription drug interactions discussed, possible drug and OTC drug and food interactions discussed, lab monitoring and follow-up discussed, therapeutic rationale discussed, cost of medications and cost implications discussed, adherence and missed doses discussed, pharmacy contact information discussed       Initial Epclusa consult completed on 12/3. Epclusa will be shipped on 12/3 to arrive at patient's home on  via [employer]. $0 copay. Patient will start Epclusa on . Address confirmed. Confirmed 2 patient identifiers - name and . Therapy Appropriate.     Epclusa 400/100mg- Take one tablet by mouth daily x 12 weeks  Counseling was reviewed:   1. Patient MUST take Epclusa at the SAME time every day.   2. Patient MUST avoid acid reducers without consulting with myself or provider first. Antacids are to be spaced out at least 4 hours apart from Epclusa.    3. Potential Side effects include: headaches and fatigue.   Headache: Patient may treat with OTC remedies. If Tylenol is used, dose should not exceed 2000mg per day.    4. Allergies reviewed and medication reconciliation complete (reviewed and documented in [facility]). Patient MUST contact myself or provider prior to starting any new OTC, herbal, or prescription drugs to avoid potential DDIs.    DDI: Medication list reviewed and potential DDIs addressed.  - Patient will HOLD Omeprazole during treatment. He plans to uses Tums spaced from Epclusa by 4 hours, if needed, for heartburn relief.   - Patient plans to take Epclusa in the morning at the same time as Buspar.     Discussed the importance of staying well hydrated while on therapy. Compliance stressed - patient to take missed doses as soon as remembered, but NOT to take 2 doses in one day. Patient will report questions or concerns to myself or practitioner. Patient verbalizes understanding.  Patient plans to start Epclusa on 12/5.  Consultation included: indication; goals of treatment; administration; storage and handling; side effects; how to handle side effects; the importance of compliance; how to handle missed doses; the importance of laboratory monitoring; the importance of keeping all follow up appointments.  Disease education reviewed (including transmission and prevention). Patient understands to report any medication changes to [facility] and provider. All questions answered and addressed to patients satisfaction. I will f/u with patient 1 week from start, [facility] to contact patient in 3 weeks for refills.     Tasks added this encounter   12/26/2020 - Refill Call (Auto Added)  12/11/2020 - 7 Day Post Start Touchbase   Tasks due within next 3 months   No tasks due.     Samantha Salazar, PharmD  [facility]  [address] [place] [id number]  Phone: [phone]  Fax: [phone]

## 2020-12-07 ENCOUNTER — TELEPHONE (OUTPATIENT)
Dept: HEPATOLOGY | Facility: CLINIC | Age: 45
End: 2020-12-07

## 2020-12-07 DIAGNOSIS — B18.2 CHRONIC HEPATITIS C WITHOUT HEPATIC COMA: Primary | ICD-10-CM
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
## 2020-12-07 NOTE — TELEPHONE ENCOUNTER
Pt beginning 12 weeks of Epclusa on 12/5/20  Anticipated treatment end date: 2/26/21  Jammie ?  Treatment naive  F0-1    Pls update episode and schedule:  - CMP, HCV RNA at week 6 -   - CMP, HCV RNA - SVR12 - 5/21/21    Tell pt I am sending his disc from the outside CT to our radiology conference so they can compare it with the u/s done here    thanks

## 2020-12-08 NOTE — TELEPHONE ENCOUNTER
I spoke with patient and msg from PA Scheuermann relayed and mailed to him.  Labs scheduled 1/15/21 and 5/21/21; appt notices mailed.

## 2020-12-08 NOTE — TELEPHONE ENCOUNTER
Dr. Martel,   Mr. Boswell would like to know if you think he would be a candidate for Testosterone injections.

## 2020-12-11 ENCOUNTER — SPECIALTY PHARMACY (OUTPATIENT)
Dept: PHARMACY | Facility: CLINIC | Age: 45
End: 2020-12-11

## 2020-12-11 DIAGNOSIS — B18.2 CHRONIC HEPATITIS C WITHOUT HEPATIC COMA: Primary | ICD-10-CM

## 2020-12-11 NOTE — TELEPHONE ENCOUNTER
7 Day Touchbase - Documented Responses      Most Recent Value   Have you started taking your medication?  Yes   What day did you start?  12/05/20   How are you feeling?   Patient reports feeling mild fatigue since beginning treatment. He reports the symptom is tolerable. Discussed that the symptom will likely begin to subside. Encouraged patient to stay hydrated.   How are you taking your medication? Are you having any problems taking your medication?  Patient is taking Epclusa every morning at 5:30am with his Buspar. He denies missing any doses.   Do you have any questions or concerns that we can help you with today?  Patient denies questions or concerns. Patient is aware that [facility] will be in touch in approximately 2 weeks to confirm refill.          Samantha Salazar, PharmD  [facility]  [address] [place] [id number]  Phone: [phone]  Fax: [phone]

## 2020-12-20 ENCOUNTER — PATIENT MESSAGE (OUTPATIENT)
Dept: FAMILY MEDICINE | Facility: CLINIC | Age: 45
End: 2020-12-20

## 2020-12-21 DIAGNOSIS — F41.9 ANXIETY: ICD-10-CM

## 2020-12-21 RX ORDER — BUSPIRONE HYDROCHLORIDE 10 MG/1
10 TABLET ORAL 2 TIMES DAILY
Qty: 180 TABLET | Refills: 0 | Status: SHIPPED | OUTPATIENT
Start: 2020-12-21 | End: 2020-12-31 | Stop reason: SDUPTHER

## 2020-12-22 ENCOUNTER — TELEPHONE (OUTPATIENT)
Dept: HEPATOLOGY | Facility: CLINIC | Age: 45
End: 2020-12-22

## 2020-12-22 ENCOUNTER — PATIENT MESSAGE (OUTPATIENT)
Dept: HEPATOLOGY | Facility: CLINIC | Age: 45
End: 2020-12-22

## 2020-12-22 DIAGNOSIS — K76.9 LIVER LESION: ICD-10-CM

## 2020-12-22 DIAGNOSIS — K76.9 LIVER DISEASE, UNSPECIFIED: ICD-10-CM

## 2020-12-22 DIAGNOSIS — B18.2 CHRONIC HEPATITIS C WITHOUT HEPATIC COMA: Primary | ICD-10-CM

## 2020-12-24 ENCOUNTER — PATIENT MESSAGE (OUTPATIENT)
Dept: HEPATOLOGY | Facility: CLINIC | Age: 45
End: 2020-12-24

## 2020-12-24 ENCOUNTER — LAB VISIT (OUTPATIENT)
Dept: LAB | Facility: HOSPITAL | Age: 45
End: 2020-12-24
Attending: PHYSICIAN ASSISTANT
Payer: MEDICAID

## 2020-12-24 DIAGNOSIS — K76.9 LIVER DISEASE, UNSPECIFIED: ICD-10-CM

## 2020-12-24 DIAGNOSIS — B18.2 CHRONIC HEPATITIS C WITHOUT HEPATIC COMA: ICD-10-CM

## 2020-12-24 DIAGNOSIS — K76.9 LIVER LESION: ICD-10-CM

## 2020-12-24 LAB — AFP SERPL-MCNC: 1.3 NG/ML (ref 0–8.4)

## 2020-12-24 PROCEDURE — 36415 COLL VENOUS BLD VENIPUNCTURE: CPT

## 2020-12-24 PROCEDURE — 82105 ALPHA-FETOPROTEIN SERUM: CPT

## 2020-12-24 NOTE — TELEPHONE ENCOUNTER
Call placed to the patient regarding Labs and TP CT Scan Abd.  Patient is scheduled for both today 12/24/20 in [place]. The TP CT Scan is still pending review. The patient received a call that is was not approved.    Patient will do Labs today in [place].  TP CT Scan Abd rescheduled on the [facility] for Thurs 1/7/21 at 12:15 pm per request of the patient. PA remains under review.  Appt reminder letter placed in the mail.

## 2020-12-28 ENCOUNTER — SPECIALTY PHARMACY (OUTPATIENT)
Dept: PHARMACY | Facility: CLINIC | Age: 45
End: 2020-12-28

## 2020-12-28 DIAGNOSIS — B18.2 CHRONIC HEPATITIS C WITHOUT HEPATIC COMA: Primary | ICD-10-CM

## 2020-12-28 NOTE — TELEPHONE ENCOUNTER
Specialty Pharmacy - Refill Coordination    Specialty Medication Orders Linked to Encounter      Most Recent Value   Medication #1  sofosbuvir-velpatasvir 400-100 mg Tab (Order#[id number], Rx#[id number])          Refill Questions - Documented Responses      Most Recent Value   Relationship to patient of person spoken to?  Self   HIPAA/medical authority confirmed?  Yes   Any changes in contact preferences or allowed representatives?  No   Has the patient had any insurance changes?  No   Has the patient had any changes to specialty medication, dose, or instructions?  No   Has the patient started taking any new medications, herbals, or supplements?  No   Has the patient been diagnosed with any new medical conditions?  No   Does the patient have any new allergies to medications or foods?  No   Does the patient have any concerns about side effects?  No   Can the patient store medication/sharps container properly (at the correct temperature, away from children/pets, etc.)?  Yes   Can the patient call emergency services (911) in the event of an emergency?  Yes   Does the patient have any concerns or questions about taking or administering this medication as prescribed?  No   How many doses did the patient miss in the past 4 weeks or since the last fill?  0   How many doses does the patient have on hand?  4   How many days does the patient report on hand quantity will last?  4   Does the number of doses/days supply remaining match pharmacy expected amounts?  Yes   Does the patient feel that this medication is effective?  Yes   During the past 4 weeks, has patient missed any activities due to condition or medication?  No   During the past 4 weeks, did patient have any of the following urgent care visits?  None   How will the patient receive the medication?  Mail   When does the patient need to receive the medication?  01/01/21   Shipping Address  Home   Address in [facility] confirmed and updated if neccessary?  Yes    Expected Copay ($)  0   Is the patient able to afford the medication copay?  Yes   Payment Method  zero copay   Days supply of Refill  28   Would patient like to speak to a pharmacist?  No   Do you want to trigger an intervention?  No   Do you want to trigger an additional referral task?  No   Refill activity completed?  Yes   Refill activity plan  Refill scheduled   Shipment/Pickup Date:  20          Current Outpatient Medications   Medication Sig    busPIRone (BUSPAR) 10 MG tablet Take 1 tablet (10 mg total) by mouth 2 (two) times daily.    ibuprofen (ADVIL,MOTRIN) 200 MG tablet Take 200 mg by mouth every 6 (six) hours as needed for Pain.    lisinopriL (PRINIVIL,ZESTRIL) 20 MG tablet Take 1 tablet (20 mg total) by mouth once daily.    multivit-mins no.63/iron/folic (M-VIT ORAL) Take by mouth once daily.    omeprazole (PRILOSEC) 20 MG capsule Take 20 mg by mouth once daily. PRN GERD    sofosbuvir-velpatasvir 400-100 mg Tab Take 1 tablet by mouth once daily.    traMADoL (ULTRAM) 50 mg tablet Take 50 mg by mouth every 6 (six) hours.   Last reviewed on 2020  4:57 PM by Samantha Salazar, Mar    Review of patient's allergies indicates:  No Known Allergies Last reviewed on  2020 4:27 PM by Samantha Salazar      Epclusa refill (2 of 3) confirmed and reassessment complete. We will ship Epclusa refill on  via fedex to arrive on . $0 copay- 004. Confirmed 2 patient identifiers - name and . Therapy appropriate.     Patient has 4 doses of Epclusa remaining and takes it around 5:30am daily with Buspar. Patient states that he was experiencing fatigue the first few days of treatment, but that has resolved. He confirms he is holding Omeprazole. Patient is using Tums, if needed, for heartburn. He is spacing Tums appropriately (at least 4 hours after Epclusa dose). No missed doses, no new medications, no new allergies or health conditions reported at this time. Allergies reviewed and medication  reconciliation complete (reviewed and documented in [facility]).  Disease education reviewed (including transmission and prevention). Patient counseled on importance of maintaining adherence and keeping lab appointments which were scheduled. All questions answered and addressed to patients satisfaction. Advised to call [facility] and provider if any issues arise.  Pt verbalized understanding.    Tasks added this encounter   1/20/2021 - Refill Call (Auto Added)   Tasks due within next 3 months   No tasks due.     Samantha Salazar, PharmD  [facility]  [address] [place] [id number]  Phone: [phone]  Fax: [phone]

## 2020-12-31 ENCOUNTER — OFFICE VISIT (OUTPATIENT)
Dept: FAMILY MEDICINE | Facility: CLINIC | Age: 45
End: 2020-12-31
Payer: MEDICAID

## 2020-12-31 VITALS
OXYGEN SATURATION: 98 % | BODY MASS INDEX: 28.55 KG/M2 | HEART RATE: 73 BPM | TEMPERATURE: 98 F | DIASTOLIC BLOOD PRESSURE: 88 MMHG | HEIGHT: 74 IN | SYSTOLIC BLOOD PRESSURE: 136 MMHG | WEIGHT: 222.5 LBS

## 2020-12-31 DIAGNOSIS — I10 ESSENTIAL HYPERTENSION: ICD-10-CM

## 2020-12-31 DIAGNOSIS — B18.2 CHRONIC HEPATITIS C WITHOUT HEPATIC COMA: ICD-10-CM

## 2020-12-31 DIAGNOSIS — F41.9 ANXIETY: Primary | ICD-10-CM

## 2020-12-31 DIAGNOSIS — E78.2 MIXED HYPERLIPIDEMIA: ICD-10-CM

## 2020-12-31 PROCEDURE — 99214 PR OFFICE/OUTPT VISIT, EST, LEVL IV, 30-39 MIN: ICD-10-PCS | Mod: S$PBB,,, | Performed by: FAMILY MEDICINE

## 2020-12-31 PROCEDURE — 99214 OFFICE O/P EST MOD 30 MIN: CPT | Mod: S$PBB,,, | Performed by: FAMILY MEDICINE

## 2020-12-31 PROCEDURE — 99214 OFFICE O/P EST MOD 30 MIN: CPT | Performed by: FAMILY MEDICINE

## 2020-12-31 RX ORDER — BUSPIRONE HYDROCHLORIDE 10 MG/1
20 TABLET ORAL 2 TIMES DAILY
Qty: 360 TABLET | Refills: 0 | Status: SHIPPED | OUTPATIENT
Start: 2020-12-31 | End: 2021-03-09 | Stop reason: SDUPTHER
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
## 2021-01-07 ENCOUNTER — HOSPITAL ENCOUNTER (OUTPATIENT)
Dept: RADIOLOGY | Facility: HOSPITAL | Age: 46
Discharge: HOME | End: 2021-01-07
Attending: PHYSICIAN ASSISTANT
Payer: MEDICAID

## 2021-01-07 ENCOUNTER — PATIENT MESSAGE (OUTPATIENT)
Dept: HEPATOLOGY | Facility: CLINIC | Age: 46
End: 2021-01-07

## 2021-01-07 DIAGNOSIS — K76.9 LIVER DISEASE, UNSPECIFIED: ICD-10-CM

## 2021-01-07 LAB
CREAT SERPL-MCNC: 1.1 MG/DL (ref 0.5–1.4)
SAMPLE: NORMAL

## 2021-01-07 PROCEDURE — 25500020 PHARM REV CODE 255: Performed by: PHYSICIAN ASSISTANT

## 2021-01-07 PROCEDURE — 74170 CT ABD WO CNTRST FLWD CNTRST: CPT | Mod: 26,,, | Performed by: RADIOLOGY

## 2021-01-07 PROCEDURE — 74170 CT ABD WO CNTRST FLWD CNTRST: CPT | Mod: TC

## 2021-01-07 PROCEDURE — 74170 CT ABDOMEN W WO CONTRAST: ICD-10-PCS | Mod: 26,,, | Performed by: RADIOLOGY

## 2021-01-07 RX ADMIN — IOHEXOL 100 ML: 350 INJECTION, SOLUTION INTRAVENOUS at 02:01

## 2021-01-08 ENCOUNTER — TELEPHONE (OUTPATIENT)
Dept: HEPATOLOGY | Facility: CLINIC | Age: 46
End: 2021-01-08

## 2021-01-13 ENCOUNTER — PATIENT MESSAGE (OUTPATIENT)
Dept: FAMILY MEDICINE | Facility: CLINIC | Age: 46
End: 2021-01-13

## 2021-01-15 ENCOUNTER — LAB VISIT (OUTPATIENT)
Dept: LAB | Facility: HOSPITAL | Age: 46
End: 2021-01-15
Attending: PHYSICIAN ASSISTANT
Payer: MEDICAID

## 2021-01-15 DIAGNOSIS — B18.2 CHRONIC HEPATITIS C WITHOUT HEPATIC COMA: ICD-10-CM

## 2021-01-15 LAB
ALBUMIN SERPL BCP-MCNC: 4 G/DL (ref 3.5–5.2)
ALP SERPL-CCNC: 66 U/L (ref 55–135)
ALT SERPL W/O P-5'-P-CCNC: 21 U/L (ref 10–44)
ANION GAP SERPL CALC-SCNC: 9 MMOL/L (ref 8–16)
AST SERPL-CCNC: 16 U/L (ref 10–40)
BILIRUB SERPL-MCNC: 0.6 MG/DL (ref 0.1–1)
BUN SERPL-MCNC: 13 MG/DL (ref 6–20)
CALCIUM SERPL-MCNC: 9.3 MG/DL (ref 8.7–10.5)
CHLORIDE SERPL-SCNC: 105 MMOL/L (ref 95–110)
CO2 SERPL-SCNC: 26 MMOL/L (ref 23–29)
CREAT SERPL-MCNC: 1.2 MG/DL (ref 0.5–1.4)
EST. GFR  (AFRICAN AMERICAN): >60 ML/MIN/1.73 M^2
EST. GFR  (NON AFRICAN AMERICAN): >60 ML/MIN/1.73 M^2
GLUCOSE SERPL-MCNC: 103 MG/DL (ref 70–110)
POTASSIUM SERPL-SCNC: 4.4 MMOL/L (ref 3.5–5.1)
PROT SERPL-MCNC: 7.3 G/DL (ref 6–8.4)
SODIUM SERPL-SCNC: 140 MMOL/L (ref 136–145)

## 2021-01-15 PROCEDURE — 87522 HEPATITIS C REVRS TRNSCRPJ: CPT

## 2021-01-15 PROCEDURE — 80053 COMPREHEN METABOLIC PANEL: CPT

## 2021-01-18 ENCOUNTER — PATIENT MESSAGE (OUTPATIENT)
Dept: FAMILY MEDICINE | Facility: CLINIC | Age: 46
End: 2021-01-18

## 2021-01-19 ENCOUNTER — TELEPHONE (OUTPATIENT)
Dept: FAMILY MEDICINE | Facility: CLINIC | Age: 46
End: 2021-01-19

## 2021-01-19 LAB
HCV RNA SERPL NAA+PROBE-LOG IU: <1.08 LOG (10) IU/ML
HCV RNA SERPL QL NAA+PROBE: NOT DETECTED IU/ML
HCV RNA SPEC NAA+PROBE-ACNC: <12 IU/ML

## 2021-01-20 ENCOUNTER — SPECIALTY PHARMACY (OUTPATIENT)
Dept: PHARMACY | Facility: CLINIC | Age: 46
End: 2021-01-20

## 2021-02-05 ENCOUNTER — PATIENT MESSAGE (OUTPATIENT)
Dept: FAMILY MEDICINE | Facility: CLINIC | Age: 46
End: 2021-02-05

## 2021-02-24 ENCOUNTER — CLINICAL SUPPORT (OUTPATIENT)
Dept: URGENT CARE | Facility: CLINIC | Age: 46
End: 2021-02-24
Payer: MEDICAID

## 2021-02-24 DIAGNOSIS — Z91.89 AT INCREASED RISK OF EXPOSURE TO COVID-19 VIRUS: Primary | ICD-10-CM

## 2021-02-24 LAB
CTP QC/QA: YES
SARS-COV-2 RDRP RESP QL NAA+PROBE: POSITIVE

## 2021-02-24 PROCEDURE — U0002 COVID-19 LAB TEST NON-CDC: HCPCS | Mod: QW,S$GLB,, | Performed by: FAMILY MEDICINE

## 2021-02-24 PROCEDURE — 99211 OFF/OP EST MAY X REQ PHY/QHP: CPT | Mod: S$GLB,,, | Performed by: FAMILY MEDICINE

## 2021-02-24 PROCEDURE — 99211 PR OFFICE/OUTPT VISIT, EST, LEVL I: ICD-10-PCS | Mod: S$GLB,,, | Performed by: FAMILY MEDICINE

## 2021-02-24 PROCEDURE — U0002: ICD-10-PCS | Mod: QW,S$GLB,, | Performed by: FAMILY MEDICINE

## 2021-02-27 DIAGNOSIS — U07.1 COVID-19 VIRUS DETECTED: ICD-10-CM

## 2021-03-09 DIAGNOSIS — F41.9 ANXIETY: ICD-10-CM

## 2021-03-09 DIAGNOSIS — I10 ESSENTIAL HYPERTENSION: ICD-10-CM

## 2021-03-09 RX ORDER — BUSPIRONE HYDROCHLORIDE 10 MG/1
20 TABLET ORAL 2 TIMES DAILY
Qty: 360 TABLET | Refills: 0 | Status: SHIPPED | OUTPATIENT
Start: 2021-03-09 | End: 2021-06-24

## 2021-03-09 RX ORDER — LISINOPRIL 20 MG/1
20 TABLET ORAL DAILY
Qty: 90 TABLET | Refills: 1 | Status: SHIPPED | OUTPATIENT
Start: 2021-03-09 | End: 2021-06-24 | Stop reason: DRUGHIGH

## 2021-04-29 ENCOUNTER — PATIENT MESSAGE (OUTPATIENT)
Dept: RESEARCH | Facility: HOSPITAL | Age: 46
End: 2021-04-29

## 2021-06-21 ENCOUNTER — PATIENT MESSAGE (OUTPATIENT)
Dept: FAMILY MEDICINE | Facility: CLINIC | Age: 46
End: 2021-06-21

## 2021-06-22 ENCOUNTER — PATIENT MESSAGE (OUTPATIENT)
Dept: FAMILY MEDICINE | Facility: CLINIC | Age: 46
End: 2021-06-22

## 2021-06-24 ENCOUNTER — OFFICE VISIT (OUTPATIENT)
Dept: FAMILY MEDICINE | Facility: CLINIC | Age: 46
End: 2021-06-24
Payer: MEDICAID

## 2021-06-24 VITALS
HEART RATE: 60 BPM | BODY MASS INDEX: 26.56 KG/M2 | SYSTOLIC BLOOD PRESSURE: 120 MMHG | WEIGHT: 207 LBS | TEMPERATURE: 98 F | HEIGHT: 74 IN | OXYGEN SATURATION: 97 % | DIASTOLIC BLOOD PRESSURE: 80 MMHG

## 2021-06-24 DIAGNOSIS — I10 ESSENTIAL HYPERTENSION: ICD-10-CM

## 2021-06-24 DIAGNOSIS — F41.9 ANXIETY: Primary | ICD-10-CM

## 2021-06-24 PROCEDURE — 99214 PR OFFICE/OUTPT VISIT, EST, LEVL IV, 30-39 MIN: ICD-10-PCS | Mod: S$PBB,,, | Performed by: NURSE PRACTITIONER

## 2021-06-24 PROCEDURE — 99214 OFFICE O/P EST MOD 30 MIN: CPT | Mod: S$PBB,,, | Performed by: NURSE PRACTITIONER

## 2021-06-24 PROCEDURE — 99214 OFFICE O/P EST MOD 30 MIN: CPT | Performed by: NURSE PRACTITIONER

## 2021-06-24 RX ORDER — VENLAFAXINE HYDROCHLORIDE 150 MG/1
150 CAPSULE, EXTENDED RELEASE ORAL DAILY
Qty: 30 CAPSULE | Refills: 1 | Status: SHIPPED | OUTPATIENT
Start: 2021-06-24 | End: 2021-08-18

## 2021-06-24 RX ORDER — LISINOPRIL 40 MG/1
40 TABLET ORAL DAILY
COMMUNITY
End: 2022-02-03 | Stop reason: SDUPTHER

## 2021-06-24 RX ORDER — VENLAFAXINE HYDROCHLORIDE 75 MG/1
1 CAPSULE, EXTENDED RELEASE ORAL DAILY
COMMUNITY
Start: 2021-06-23 | End: 2021-06-24

## 2021-06-24 RX ORDER — LISINOPRIL 40 MG/1
40 TABLET ORAL DAILY
Qty: 90 TABLET | Refills: 1 | Status: SHIPPED | OUTPATIENT
Start: 2021-06-24 | End: 2021-08-09 | Stop reason: SDUPTHER

## 2021-07-26 ENCOUNTER — PATIENT MESSAGE (OUTPATIENT)
Dept: FAMILY MEDICINE | Facility: CLINIC | Age: 46
End: 2021-07-26

## 2021-07-29 ENCOUNTER — LAB VISIT (OUTPATIENT)
Dept: LAB | Facility: HOSPITAL | Age: 46
End: 2021-07-29
Attending: PHYSICIAN ASSISTANT
Payer: MEDICAID

## 2021-07-29 DIAGNOSIS — B18.2 CHRONIC HEPATITIS C WITHOUT HEPATIC COMA: ICD-10-CM

## 2021-07-29 LAB
ALBUMIN SERPL BCP-MCNC: 4.3 G/DL (ref 3.5–5.2)
ALP SERPL-CCNC: 72 U/L (ref 55–135)
ALT SERPL W/O P-5'-P-CCNC: 18 U/L (ref 10–44)
ANION GAP SERPL CALC-SCNC: 9 MMOL/L (ref 8–16)
AST SERPL-CCNC: 17 U/L (ref 10–40)
BILIRUB SERPL-MCNC: 0.5 MG/DL (ref 0.1–1)
BUN SERPL-MCNC: 18 MG/DL (ref 6–20)
CALCIUM SERPL-MCNC: 9.8 MG/DL (ref 8.7–10.5)
CHLORIDE SERPL-SCNC: 107 MMOL/L (ref 95–110)
CO2 SERPL-SCNC: 27 MMOL/L (ref 23–29)
CREAT SERPL-MCNC: 1.1 MG/DL (ref 0.5–1.4)
EST. GFR  (AFRICAN AMERICAN): >60 ML/MIN/1.73 M^2
EST. GFR  (NON AFRICAN AMERICAN): >60 ML/MIN/1.73 M^2
GLUCOSE SERPL-MCNC: 66 MG/DL (ref 70–110)
POTASSIUM SERPL-SCNC: 4.5 MMOL/L (ref 3.5–5.1)
PROT SERPL-MCNC: 7.4 G/DL (ref 6–8.4)
SODIUM SERPL-SCNC: 143 MMOL/L (ref 136–145)

## 2021-07-29 PROCEDURE — 87522 HEPATITIS C REVRS TRNSCRPJ: CPT | Performed by: PHYSICIAN ASSISTANT

## 2021-07-29 PROCEDURE — 80053 COMPREHEN METABOLIC PANEL: CPT | Performed by: PHYSICIAN ASSISTANT

## 2021-07-29 PROCEDURE — 36415 COLL VENOUS BLD VENIPUNCTURE: CPT | Performed by: PHYSICIAN ASSISTANT

## 2021-07-30 LAB — HEPATITIS C VIRUS (HCV) RNA DETECTION/QUANTIFICATION RT-PCR: NORMAL IU/ML

## 2021-08-02 ENCOUNTER — PATIENT MESSAGE (OUTPATIENT)
Dept: HEPATOLOGY | Facility: CLINIC | Age: 46
End: 2021-08-02

## 2021-08-02 ENCOUNTER — TELEPHONE (OUTPATIENT)
Dept: HEPATOLOGY | Facility: CLINIC | Age: 46
End: 2021-08-02

## 2021-08-02 DIAGNOSIS — Z86.19 HEPATITIS C VIRUS INFECTION CURED AFTER ANTIVIRAL DRUG THERAPY: ICD-10-CM

## 2021-08-02 PROBLEM — B18.2 CHRONIC HEPATITIS C WITHOUT HEPATIC COMA: Status: RESOLVED | Noted: 2020-11-17 | Resolved: 2021-08-02

## 2021-08-02 PROBLEM — R74.01 TRANSAMINITIS: Status: RESOLVED | Noted: 2020-11-17 | Resolved: 2021-08-02

## 2021-08-09 ENCOUNTER — PATIENT MESSAGE (OUTPATIENT)
Dept: FAMILY MEDICINE | Facility: CLINIC | Age: 46
End: 2021-08-09

## 2021-08-09 DIAGNOSIS — I10 ESSENTIAL HYPERTENSION: ICD-10-CM

## 2021-08-09 RX ORDER — LISINOPRIL 40 MG/1
40 TABLET ORAL DAILY
Qty: 90 TABLET | Refills: 1 | Status: SHIPPED | OUTPATIENT
Start: 2021-08-09 | End: 2022-02-03

## 2021-08-10 ENCOUNTER — PATIENT MESSAGE (OUTPATIENT)
Dept: FAMILY MEDICINE | Facility: CLINIC | Age: 46
End: 2021-08-10

## 2021-11-29 DIAGNOSIS — F41.9 ANXIETY: ICD-10-CM

## 2021-11-29 RX ORDER — VENLAFAXINE HYDROCHLORIDE 150 MG/1
CAPSULE, EXTENDED RELEASE ORAL
Qty: 30 CAPSULE | Refills: 0 | Status: SHIPPED | OUTPATIENT
Start: 2021-11-29 | End: 2022-01-04

## 2022-02-03 RX ORDER — LISINOPRIL 40 MG/1
40 TABLET ORAL DAILY
Qty: 30 TABLET | Refills: 0 | Status: SHIPPED | OUTPATIENT
Start: 2022-02-03 | End: 2022-03-05

## 2022-06-05 ENCOUNTER — OFFICE VISIT (OUTPATIENT)
Dept: URGENT CARE | Facility: CLINIC | Age: 47
End: 2022-06-05
Payer: MEDICAID

## 2022-06-05 VITALS
TEMPERATURE: 98 F | BODY MASS INDEX: 26.56 KG/M2 | WEIGHT: 207 LBS | HEART RATE: 69 BPM | HEIGHT: 74 IN | DIASTOLIC BLOOD PRESSURE: 80 MMHG | RESPIRATION RATE: 17 BRPM | SYSTOLIC BLOOD PRESSURE: 135 MMHG | OXYGEN SATURATION: 99 %

## 2022-06-05 DIAGNOSIS — Z72.0 TOBACCO USE: ICD-10-CM

## 2022-06-05 DIAGNOSIS — M25.522 ARTHRALGIA OF LEFT ELBOW: Primary | ICD-10-CM

## 2022-06-05 PROCEDURE — 1159F MED LIST DOCD IN RCRD: CPT | Mod: CPTII,S$GLB,, | Performed by: NURSE PRACTITIONER

## 2022-06-05 PROCEDURE — 4010F PR ACE/ARB THEARPY RXD/TAKEN: ICD-10-PCS | Mod: CPTII,S$GLB,, | Performed by: NURSE PRACTITIONER

## 2022-06-05 PROCEDURE — 1159F PR MEDICATION LIST DOCUMENTED IN MEDICAL RECORD: ICD-10-PCS | Mod: CPTII,S$GLB,, | Performed by: NURSE PRACTITIONER

## 2022-06-05 PROCEDURE — 3008F PR BODY MASS INDEX (BMI) DOCUMENTED: ICD-10-PCS | Mod: CPTII,S$GLB,, | Performed by: NURSE PRACTITIONER

## 2022-06-05 PROCEDURE — 3075F SYST BP GE 130 - 139MM HG: CPT | Mod: CPTII,S$GLB,, | Performed by: NURSE PRACTITIONER

## 2022-06-05 PROCEDURE — 3008F BODY MASS INDEX DOCD: CPT | Mod: CPTII,S$GLB,, | Performed by: NURSE PRACTITIONER

## 2022-06-05 PROCEDURE — 3079F PR MOST RECENT DIASTOLIC BLOOD PRESSURE 80-89 MM HG: ICD-10-PCS | Mod: CPTII,S$GLB,, | Performed by: NURSE PRACTITIONER

## 2022-06-05 PROCEDURE — 1160F RVW MEDS BY RX/DR IN RCRD: CPT | Mod: CPTII,S$GLB,, | Performed by: NURSE PRACTITIONER

## 2022-06-05 PROCEDURE — 4010F ACE/ARB THERAPY RXD/TAKEN: CPT | Mod: CPTII,S$GLB,, | Performed by: NURSE PRACTITIONER

## 2022-06-05 PROCEDURE — 1160F PR REVIEW ALL MEDS BY PRESCRIBER/CLIN PHARMACIST DOCUMENTED: ICD-10-PCS | Mod: CPTII,S$GLB,, | Performed by: NURSE PRACTITIONER

## 2022-06-05 PROCEDURE — 99214 PR OFFICE/OUTPT VISIT, EST, LEVL IV, 30-39 MIN: ICD-10-PCS | Mod: S$GLB,,, | Performed by: NURSE PRACTITIONER

## 2022-06-05 PROCEDURE — 3079F DIAST BP 80-89 MM HG: CPT | Mod: CPTII,S$GLB,, | Performed by: NURSE PRACTITIONER

## 2022-06-05 PROCEDURE — 99214 OFFICE O/P EST MOD 30 MIN: CPT | Mod: S$GLB,,, | Performed by: NURSE PRACTITIONER

## 2022-06-05 PROCEDURE — 3075F PR MOST RECENT SYSTOLIC BLOOD PRESS GE 130-139MM HG: ICD-10-PCS | Mod: CPTII,S$GLB,, | Performed by: NURSE PRACTITIONER

## 2022-06-05 RX ORDER — NAPROXEN 500 MG/1
500 TABLET ORAL 2 TIMES DAILY
Qty: 20 TABLET | Refills: 0 | Status: SHIPPED | OUTPATIENT
Start: 2022-06-05 | End: 2022-06-15

## 2022-06-05 RX ORDER — DEXAMETHASONE SODIUM PHOSPHATE 100 MG/10ML
8 INJECTION INTRAMUSCULAR; INTRAVENOUS
Status: COMPLETED | OUTPATIENT
Start: 2022-06-05 | End: 2022-06-05

## 2022-06-05 RX ORDER — LISINOPRIL 40 MG/1
40 TABLET ORAL DAILY
COMMUNITY
End: 2022-07-21 | Stop reason: SDUPTHER

## 2022-06-05 RX ADMIN — DEXAMETHASONE SODIUM PHOSPHATE 8 MG: 100 INJECTION INTRAMUSCULAR; INTRAVENOUS at 12:06

## 2022-06-05 NOTE — PATIENT INSTRUCTIONS
.1.  Take all medications as directed. If you have been prescribed antibiotics, make sure to complete them.   2.  Rest and keep yourself/patient well hydrated. For adults, it is recommended to drink at least 8-10 glasses of water daily.   3.  For patients above 6 months of age who are not allergic to and are not on anticoagulants, you can alternate Tylenol and Motrin every 4-6 hours for fever above 100.4F and/or pain.  For patients less than 6 months of age, allergic to or intolerant to NSAIDS, have gastritis, gastric ulcers, or history of GI bleeds, are pregnant, or are on anticoagulant therapy, you can take Tylenol every 4 hours as needed for fever above 100.4F and/or pain.   4. You should schedule a follow-up appointment with your Primary Care Provider/Pediatrician for recheck in 2-3 days or as directed at this visit.   5.  If your condition fails to improve in a timely manner, you should receive another evaluation by your Primary Care Provider/Pediatrician to discuss your concerns or return to urgent care for a recheck.  If your condition worsens at any time, you should report immediately to your nearest Emergency Department for further evaluation. **You must understand that you have received Urgent Care treatment only and that you may be released before all of your medical problems are known or treated. You, the patient, are responsible to arrange for follow-up care as instructed.     Patient Education       Joint Pain   About this topic   Joint pain is sometimes called arthralgia. You have pain where one or more bones are connected.       What are the causes?   Joint pain may be caused by:  Injury  Infection  Health problems like an immune disorder, or other illness  Problems with cartilage, ligaments, or tendons  What can make this more likely to happen?   Older age  Doing the same motion over and over with a joint  Being overweight  Injuries  What are the main signs?   You may have mild or very bad pain. The  pain may be constant or it may come and go. You may have trouble moving the joint that hurts. The pain may burn, stab, or throb. It may be sharp or dull. Your joint may feel stiff, numb, or tingly. It may be hard to move or put weight on a joint if the pain is bad.  How does the doctor diagnose this health problem?   The doctor will ask you questions about your history and do an exam. The doctor will check your joints with care and may order:  Lab tests  X-rays  How does the doctor treat this health problem?   Your care is based on what is causing your pain. The doctor will also treat it based on how bad your pain is and where your pain is found on your body. The doctor may suggest you:  Limit your activity.  Do stretching exercises.  Your doctor may want you to start an exercise program. Some kinds of exercise, like swimming, may help ease pain. It can keep your muscles strong and helps you maintain a healthy weight.  Place an ice pack or a bag of frozen peas wrapped in a towel over the painful part. Never put ice right on the skin. Do not leave the ice on more than 10 to 15 minutes at a time.  Use heat. Put a heating pad on your painful part for no more than 20 minutes at a time. Never go to sleep with a heating pad on as this can cause burns.  What drugs may be needed?   The doctor may order drugs to:  Help with pain and swelling  Your doctor may instruct you to take:  Drugs like ibuprofen or naproxen for pain. These are all nonsteroidal anti-inflammatory drugs (NSAIDS). Do not take more than one type of these drugs at the same time.  Drugs for pain such as acetaminophen.  The doctor may give you a shot of an anti-inflammatory drug called a corticosteroid. This will help with swelling.  Helpful tips   Stay active and work out to keep your muscles strong and flexible.  Keep a healthy weight. Being heavy puts more stress on your joints. This makes them more likely to hurt.  Warm up slowly and stretch before you  work out. Use good ways to train, such as slowly adding to how far you run. Do not work out if you are overly tired. Take extra care if working out in cold weather.  Last Reviewed Date   2020-10-12  Consumer Information Use and Disclaimer   This information is not specific medical advice and does not replace information you receive from your health care provider. This is only a brief summary of general information. It does NOT include all information about conditions, illnesses, injuries, tests, procedures, treatments, therapies, discharge instructions or life-style choices that may apply to you. You must talk with your health care provider for complete information about your health and treatment options. This information should not be used to decide whether or not to accept your health care providers advice, instructions or recommendations. Only your health care provider has the knowledge and training to provide advice that is right for you.  Copyright   Copyright © 2021 [employer] and its affiliates and/or licensors. All rights reserved.

## 2022-06-05 NOTE — PROGRESS NOTES
"Subjective:       Patient ID: Augustine Boswell is a 47 y.o. male.    Vitals:  height is 6' 2" (1.88 m) and weight is 93.9 kg (207 lb). His temperature is 97.8 °F (36.6 °C). His blood pressure is 135/80 and his pulse is 69. His respiration is 17 and oxygen saturation is 99%.     Chief Complaint: Elbow Pain    Patient is currently in rehab and does not want pain medication. He reports he has had elbow pain in the past that was relieved by a steroid shot in the elbow. He denies injury, denies excess intake of red meats, seafood and no beer.     Elbow Pain  This is a recurrent problem. Episode onset: 3 weeks  The problem occurs constantly. The problem has been waxing and waning. Associated symptoms include arthralgias (left elbow). Pertinent negatives include no abdominal pain, anorexia, change in bowel habit, chest pain, chills, congestion, coughing, diaphoresis, fatigue, fever, headaches, joint swelling, myalgias, nausea, neck pain, numbness, rash, sore throat, swollen glands, urinary symptoms, vertigo, visual change, vomiting or weakness. Exacerbated by: extending left elbow, or squeezing left hand  He has tried NSAIDs and acetaminophen for the symptoms.       Constitution: Negative. Negative for chills, sweating, fatigue and fever.   HENT: Negative for congestion and sore throat.    Neck: neck negative. Negative for neck pain and bulging disc disease.   Cardiovascular: Negative.  Negative for chest pain.   Respiratory: Negative.  Negative for cough.    Gastrointestinal: Negative for abdominal pain, nausea and vomiting.   Musculoskeletal: Positive for joint pain (left elbow). Negative for trauma, joint swelling and muscle ache.   Skin: Negative for rash.   Neurological: Negative for history of vertigo, headaches and numbness.       Objective:      Physical Exam   Constitutional: He is oriented to person, place, and time. He appears well-developed. He is cooperative.  Non-toxic appearance. He does not appear ill. No " distress.   HENT:   Head: Normocephalic and atraumatic.   Ears:   Right Ear: Hearing and external ear normal.   Left Ear: Hearing and external ear normal.   Nose: Nose normal. No mucosal edema, rhinorrhea or nasal deformity. No epistaxis. Right sinus exhibits no maxillary sinus tenderness and no frontal sinus tenderness. Left sinus exhibits no maxillary sinus tenderness and no frontal sinus tenderness.   Mouth/Throat: Uvula is midline, oropharynx is clear and moist and mucous membranes are normal. No trismus in the jaw. Normal dentition. No uvula swelling. No posterior oropharyngeal erythema.   Eyes: Conjunctivae and lids are normal. Right eye exhibits no discharge. Left eye exhibits no discharge. No scleral icterus.   Neck: Trachea normal and phonation normal. Neck supple.   Cardiovascular: Normal rate and normal pulses.   Pulmonary/Chest: Effort normal. No respiratory distress.   Abdominal: Normal appearance and bowel sounds are normal. He exhibits no distension and no mass. Soft. There is no abdominal tenderness.   Musculoskeletal: Normal range of motion.         General: No deformity. Normal range of motion.      Left elbow: He exhibits normal range of motion and no swelling. No tenderness found.        Arms:    Neurological: He is alert and oriented to person, place, and time. He exhibits normal muscle tone. Coordination normal.   Skin: Skin is warm, dry, intact, not diaphoretic and not pale.   Psychiatric: His speech is normal and behavior is normal. Judgment and thought content normal.   Nursing note and vitals reviewed.        Assessment:       1. Arthralgia of left elbow    2. Tobacco use          Plan:         Arthralgia of left elbow  -     dexamethasone injection 8 mg  -     naproxen (NAPROSYN) 500 MG tablet; Take 1 tablet (500 mg total) by mouth 2 (two) times daily. for 10 days  Dispense: 20 tablet; Refill: 0    Tobacco use  -     Ambulatory referral/consult to Smoking Cessation Program      Patient  Instructions   .1.  Take all medications as directed. If you have been prescribed antibiotics, make sure to complete them.   2.  Rest and keep yourself/patient well hydrated. For adults, it is recommended to drink at least 8-10 glasses of water daily.   3.  For patients above 6 months of age who are not allergic to and are not on anticoagulants, you can alternate Tylenol and Motrin every 4-6 hours for fever above 100.4F and/or pain.  For patients less than 6 months of age, allergic to or intolerant to NSAIDS, have gastritis, gastric ulcers, or history of GI bleeds, are pregnant, or are on anticoagulant therapy, you can take Tylenol every 4 hours as needed for fever above 100.4F and/or pain.   4. You should schedule a follow-up appointment with your Primary Care Provider/Pediatrician for recheck in 2-3 days or as directed at this visit.   5.  If your condition fails to improve in a timely manner, you should receive another evaluation by your Primary Care Provider/Pediatrician to discuss your concerns or return to urgent care for a recheck.  If your condition worsens at any time, you should report immediately to your nearest Emergency Department for further evaluation. **You must understand that you have received Urgent Care treatment only and that you may be released before all of your medical problems are known or treated. You, the patient, are responsible to arrange for follow-up care as instructed.     Patient Education       Joint Pain   About this topic   Joint pain is sometimes called arthralgia. You have pain where one or more bones are connected.       What are the causes?   Joint pain may be caused by:  · Injury  · Infection  · Health problems like an immune disorder, or other illness  · Problems with cartilage, ligaments, or tendons  What can make this more likely to happen?   · Older age  · Doing the same motion over and over with a joint  · Being overweight  · Injuries  What are the main signs?   You may  have mild or very bad pain. The pain may be constant or it may come and go. You may have trouble moving the joint that hurts. The pain may burn, stab, or throb. It may be sharp or dull. Your joint may feel stiff, numb, or tingly. It may be hard to move or put weight on a joint if the pain is bad.  How does the doctor diagnose this health problem?   The doctor will ask you questions about your history and do an exam. The doctor will check your joints with care and may order:  · Lab tests  · X-rays  How does the doctor treat this health problem?   Your care is based on what is causing your pain. The doctor will also treat it based on how bad your pain is and where your pain is found on your body. The doctor may suggest you:  · Limit your activity.  · Do stretching exercises.  · Your doctor may want you to start an exercise program. Some kinds of exercise, like swimming, may help ease pain. It can keep your muscles strong and helps you maintain a healthy weight.  · Place an ice pack or a bag of frozen peas wrapped in a towel over the painful part. Never put ice right on the skin. Do not leave the ice on more than 10 to 15 minutes at a time.  · Use heat. Put a heating pad on your painful part for no more than 20 minutes at a time. Never go to sleep with a heating pad on as this can cause burns.  What drugs may be needed?   The doctor may order drugs to:  · Help with pain and swelling  Your doctor may instruct you to take:  · Drugs like ibuprofen or naproxen for pain. These are all nonsteroidal anti-inflammatory drugs (NSAIDS). Do not take more than one type of these drugs at the same time.  · Drugs for pain such as acetaminophen.  The doctor may give you a shot of an anti-inflammatory drug called a corticosteroid. This will help with swelling.  Helpful tips   · Stay active and work out to keep your muscles strong and flexible.  · Keep a healthy weight. Being heavy puts more stress on your joints. This makes them more  likely to hurt.  · Warm up slowly and stretch before you work out. Use good ways to train, such as slowly adding to how far you run. Do not work out if you are overly tired. Take extra care if working out in cold weather.  Last Reviewed Date   2020-10-12  Consumer Information Use and Disclaimer   This information is not specific medical advice and does not replace information you receive from your health care provider. This is only a brief summary of general information. It does NOT include all information about conditions, illnesses, injuries, tests, procedures, treatments, therapies, discharge instructions or life-style choices that may apply to you. You must talk with your health care provider for complete information about your health and treatment options. This information should not be used to decide whether or not to accept your health care providers advice, instructions or recommendations. Only your health care provider has the knowledge and training to provide advice that is right for you.  Copyright   Copyright © 2021 [employer] and its affiliates and/or licensors. All rights reserved.

## 2022-07-21 ENCOUNTER — OFFICE VISIT (OUTPATIENT)
Dept: FAMILY MEDICINE | Facility: CLINIC | Age: 47
End: 2022-07-21
Payer: MEDICAID

## 2022-07-21 VITALS
TEMPERATURE: 98 F | WEIGHT: 221.88 LBS | OXYGEN SATURATION: 96 % | HEART RATE: 65 BPM | HEIGHT: 74 IN | BODY MASS INDEX: 28.47 KG/M2 | DIASTOLIC BLOOD PRESSURE: 68 MMHG | SYSTOLIC BLOOD PRESSURE: 120 MMHG

## 2022-07-21 DIAGNOSIS — R05.9 COUGH: ICD-10-CM

## 2022-07-21 DIAGNOSIS — E78.2 MIXED HYPERLIPIDEMIA: ICD-10-CM

## 2022-07-21 DIAGNOSIS — D22.9 MULTIPLE NEVI: ICD-10-CM

## 2022-07-21 DIAGNOSIS — I10 ESSENTIAL HYPERTENSION: Primary | ICD-10-CM

## 2022-07-21 DIAGNOSIS — G89.29 CHRONIC ELBOW PAIN, LEFT: ICD-10-CM

## 2022-07-21 DIAGNOSIS — M25.522 CHRONIC ELBOW PAIN, LEFT: ICD-10-CM

## 2022-07-21 DIAGNOSIS — Z00.00 HEALTHCARE MAINTENANCE: ICD-10-CM

## 2022-07-21 LAB
CTP QC/QA: YES
SARS-COV-2 RDRP RESP QL NAA+PROBE: NEGATIVE

## 2022-07-21 PROCEDURE — 1160F RVW MEDS BY RX/DR IN RCRD: CPT | Mod: CPTII,,, | Performed by: NURSE PRACTITIONER

## 2022-07-21 PROCEDURE — 3074F PR MOST RECENT SYSTOLIC BLOOD PRESSURE < 130 MM HG: ICD-10-PCS | Mod: CPTII,,, | Performed by: NURSE PRACTITIONER

## 2022-07-21 PROCEDURE — 4010F ACE/ARB THERAPY RXD/TAKEN: CPT | Mod: CPTII,,, | Performed by: NURSE PRACTITIONER

## 2022-07-21 PROCEDURE — 3074F SYST BP LT 130 MM HG: CPT | Mod: CPTII,,, | Performed by: NURSE PRACTITIONER

## 2022-07-21 PROCEDURE — 1160F PR REVIEW ALL MEDS BY PRESCRIBER/CLIN PHARMACIST DOCUMENTED: ICD-10-PCS | Mod: CPTII,,, | Performed by: NURSE PRACTITIONER

## 2022-07-21 PROCEDURE — 3078F DIAST BP <80 MM HG: CPT | Mod: CPTII,,, | Performed by: NURSE PRACTITIONER

## 2022-07-21 PROCEDURE — 3008F PR BODY MASS INDEX (BMI) DOCUMENTED: ICD-10-PCS | Mod: CPTII,,, | Performed by: NURSE PRACTITIONER

## 2022-07-21 PROCEDURE — 3008F BODY MASS INDEX DOCD: CPT | Mod: CPTII,,, | Performed by: NURSE PRACTITIONER

## 2022-07-21 PROCEDURE — 99215 OFFICE O/P EST HI 40 MIN: CPT | Performed by: NURSE PRACTITIONER

## 2022-07-21 PROCEDURE — U0002 COVID-19 LAB TEST NON-CDC: HCPCS | Mod: PBBFAC | Performed by: NURSE PRACTITIONER

## 2022-07-21 PROCEDURE — 3078F PR MOST RECENT DIASTOLIC BLOOD PRESSURE < 80 MM HG: ICD-10-PCS | Mod: CPTII,,, | Performed by: NURSE PRACTITIONER

## 2022-07-21 PROCEDURE — 99214 OFFICE O/P EST MOD 30 MIN: CPT | Mod: S$PBB,,, | Performed by: NURSE PRACTITIONER

## 2022-07-21 PROCEDURE — 4010F PR ACE/ARB THEARPY RXD/TAKEN: ICD-10-PCS | Mod: CPTII,,, | Performed by: NURSE PRACTITIONER

## 2022-07-21 PROCEDURE — 1159F PR MEDICATION LIST DOCUMENTED IN MEDICAL RECORD: ICD-10-PCS | Mod: CPTII,,, | Performed by: NURSE PRACTITIONER

## 2022-07-21 PROCEDURE — 1159F MED LIST DOCD IN RCRD: CPT | Mod: CPTII,,, | Performed by: NURSE PRACTITIONER

## 2022-07-21 PROCEDURE — 99214 PR OFFICE/OUTPT VISIT, EST, LEVL IV, 30-39 MIN: ICD-10-PCS | Mod: S$PBB,,, | Performed by: NURSE PRACTITIONER

## 2022-07-21 RX ORDER — ALBUTEROL SULFATE 90 UG/1
2 AEROSOL, METERED RESPIRATORY (INHALATION) EVERY 6 HOURS PRN
Qty: 18 G | Refills: 1 | Status: SHIPPED | OUTPATIENT
Start: 2022-07-21 | End: 2023-10-02 | Stop reason: CLARIF

## 2022-07-21 RX ORDER — LISINOPRIL 40 MG/1
40 TABLET ORAL DAILY
Qty: 90 TABLET | Refills: 1 | Status: SHIPPED | OUTPATIENT
Start: 2022-07-21 | End: 2022-09-21 | Stop reason: SDUPTHER

## 2022-07-21 RX ORDER — BENZONATATE 200 MG/1
200 CAPSULE ORAL 3 TIMES DAILY PRN
Qty: 30 CAPSULE | Refills: 0 | Status: SHIPPED | OUTPATIENT
Start: 2022-07-21 | End: 2022-07-31

## 2022-07-21 RX ORDER — NALTREXONE 380 MG
KIT INTRAMUSCULAR
COMMUNITY
Start: 2022-01-24 | End: 2022-08-25

## 2022-07-21 NOTE — PROGRESS NOTES
SUBJECTIVE:      Patient ID: Augustine Boswell is a 47 y.o. male.    Chief Complaint: Hypertension (F/u, med refill), Elbow Pain (X 6 months. Patient has had steroid shots in the past with [facility] that he states helped a lot.), and Cough (4-5 days, unproductive. )    47-year-old male presents to the clinic for hypertension follow-up, elbow pain, and cough.     Blood pressure is controlled with Lisinopril. No recent labs.     He has been having left elbow pain for a few years.  Describes as an ache, with intermittent sharp episodes.  The pain is to the left lateral elbow.  Left hand will occasionally get numb.  Curling weights makes the pain worse.  No pain with pushups.  Reports steroid injections in the elbow in the past.  He denies injury, no history of gout.    Cough is dry and nonproductive x5 days. Reports occational nasal congestion.  Denies sick contacts.  Reviewed COVID-19 vaccine 2, due for booster.     Due for colonoscopy.     Hypertension  This is a chronic problem. The current episode started more than 1 year ago. The problem is controlled. Associated symptoms include shortness of breath. Pertinent negatives include no anxiety, blurred vision, chest pain, headaches, malaise/fatigue, neck pain, orthopnea, palpitations, peripheral edema, PND or sweats. There are no associated agents to hypertension. Risk factors for coronary artery disease include male gender and smoking/tobacco exposure. Past treatments include ACE inhibitors. The current treatment provides significant improvement. Compliance problems include diet and exercise.  There is no history of a thyroid problem.   Cough  This is a new problem. The current episode started in the past 7 days. The problem has been waxing and waning. The problem occurs every few minutes. The cough is non-productive. Associated symptoms include nasal congestion and shortness of breath. Pertinent negatives include no chest pain, chills, ear congestion, ear pain,  fever, headaches, heartburn, hemoptysis, myalgias, postnasal drip, rash, rhinorrhea, sore throat, sweats, weight loss or wheezing. The symptoms are aggravated by cold air. He has tried a beta-agonist inhaler for the symptoms. The treatment provided no relief.       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      multivit-mins no.63/iron/folic (M-VIT ORAL) Take by mouth once daily.      omeprazole (PRILOSEC) 20 MG capsule Take 20 mg by mouth once daily. PRN GERD      albuterol (PROVENTIL HFA) 90 mcg/actuation inhaler Inhale 2 puffs into the lungs every 6 (six) hours as needed for Wheezing or Shortness of Breath. Rescue 18 g 1    benzonatate (TESSALON) 200 MG capsule Take 1 capsule (200 mg total) by mouth 3 (three) times daily as needed for Cough. 30 capsule 0    lisinopriL (PRINIVIL,ZESTRIL) 40 MG tablet Take 1 tablet (40 mg total) by mouth once daily. 90 tablet 1    venlafaxine (EFFEXOR-XR) 150 MG Cp24 TAKE 1 CAPSULE(150 MG) BY MOUTH EVERY DAY (Patient not taking: No sig reported) 30 capsule 0    VIVITROL 380 mg SSRR kit        No current facility-administered medications for this visit.     Review of patient's allergies indicates:  No Known Allergies   Past Medical History:   Diagnosis Date    Allergy     Asthma     GERD  "(gastroesophageal reflux disease)     Hepatitis C virus infection cured after antiviral drug therapy     s/p epclusa, treated / cured - [id number] 7/2021     Past Surgical History:   Procedure Laterality Date    FRACTURE SURGERY      right hand       Review of Systems   Constitutional: Negative for activity change, appetite change, chills, diaphoresis, fatigue, fever, malaise/fatigue, unexpected weight change and weight loss.   HENT: Negative for congestion, ear pain, postnasal drip, rhinorrhea, sinus pressure, sore throat, trouble swallowing and voice change.    Eyes: Negative for blurred vision, pain, discharge and visual disturbance.   Respiratory: Positive for cough and shortness of breath. Negative for hemoptysis, chest tightness and wheezing.    Cardiovascular: Negative for chest pain, palpitations, orthopnea and PND.   Gastrointestinal: Negative for abdominal pain, constipation, diarrhea, heartburn, nausea and vomiting.   Genitourinary: Negative for difficulty urinating, flank pain, frequency and urgency.   Musculoskeletal: Negative for back pain, joint swelling, myalgias and neck pain.        Left elbow pain   Skin: Negative for color change and rash.   Neurological: Negative for dizziness, seizures, syncope, weakness, numbness and headaches.   Hematological: Negative for adenopathy.   Psychiatric/Behavioral: Negative for dysphoric mood and sleep disturbance. The patient is not nervous/anxious.       OBJECTIVE:      Vitals:    07/21/22 1531   BP: 120/68   BP Location: Left arm   Patient Position: Sitting   BP Method: Medium (Manual)   Pulse: 65   Temp: 98 °F (36.7 °C)   TempSrc: Oral   SpO2: 96%   Weight: 100.7 kg (221 lb 14.4 oz)   Height: 6' 2" (1.88 m)     Physical Exam  Vitals and nursing note reviewed.   Constitutional:       General: He is awake. He is not in acute distress.     Appearance: Normal appearance. He is overweight. He is not ill-appearing, toxic-appearing or diaphoretic.   HENT:      Head: " Normocephalic and atraumatic.      Nose: Nose normal.   Eyes:      General: Lids are normal. Gaze aligned appropriately.      Conjunctiva/sclera: Conjunctivae normal.      Right eye: Right conjunctiva is not injected.      Left eye: Left conjunctiva is not injected.      Pupils: Pupils are equal, round, and reactive to light.   Cardiovascular:      Rate and Rhythm: Normal rate and regular rhythm.      Pulses: Normal pulses.      Heart sounds: Normal heart sounds, S1 normal and S2 normal. No murmur heard.    No friction rub. No gallop.   Pulmonary:      Effort: Pulmonary effort is normal. No respiratory distress.      Breath sounds: Normal breath sounds. No stridor. No decreased breath sounds, wheezing, rhonchi or rales.   Chest:      Chest wall: No tenderness.   Musculoskeletal:      Left elbow: No swelling or deformity. No tenderness. No radial head or medial epicondyle tenderness.        Arms:       Cervical back: Neck supple.      Right lower leg: No edema.      Left lower leg: No edema.   Lymphadenopathy:      Cervical: No cervical adenopathy.   Skin:     General: Skin is warm and dry.      Capillary Refill: Capillary refill takes less than 2 seconds.      Findings: No erythema or rash.   Neurological:      Mental Status: He is alert and oriented to person, place, and time. Mental status is at baseline.   Psychiatric:         Attention and Perception: Attention normal.         Mood and Affect: Mood normal.         Speech: Speech normal.         Behavior: Behavior normal. Behavior is cooperative.         Thought Content: Thought content normal.         Judgment: Judgment normal.         Office Visit on 07/21/2022   Component Date Value Ref Range Status    POC Rapid COVID 07/21/2022 Negative  Negative Final     Acceptable 07/21/2022 Yes   Final     Assessment:       1. Essential hypertension    2. Mixed hyperlipidemia    3. Chronic elbow pain, left    4. Cough    5. Multiple nevi    6. Healthcare  maintenance        Plan:       Essential hypertension  Stable, continue Lisinopril.  Low sodium diet.   -     lisinopriL (PRINIVIL,ZESTRIL) 40 MG tablet; Take 1 tablet (40 mg total) by mouth once daily.  Dispense: 90 tablet; Refill: 1  -     Comprehensive Metabolic Panel; Future; Expected date: 07/21/2022  -     Lipid Panel; Future; Expected date: 07/21/2022  -     Microalbumin/Creatinine Ratio, Urine; Future; Expected date: 07/21/2022  -     Urinalysis; Future; Expected date: 07/21/2022  -     TSH; Future; Expected date: 07/21/2022    Mixed hyperlipidemia  Last LDL in 2020, due for repeat.  Managed with diet.  -     Comprehensive Metabolic Panel; Future; Expected date: 07/21/2022  -     Lipid Panel; Future; Expected date: 07/21/2022  -     TSH; Future; Expected date: 07/21/2022    Chronic elbow pain, left  No tenderness on exam, unable to reproduce pain.  Describes pain to the lateral epicondyle.  Will send to ortho for steroid injection.  NSAIDs prn pain. Rest.  Avoid movement that exacerbates pain.   -     Ambulatory referral/consult to Orthopedics; Future; Expected date: 07/28/2022    Cough  COVID-19 negative.  Tessalon prn.  Also recommended Mucinex DM.  Albuterol refilled. No wheezing on exam or adventitious breath sounds to suspect pneumonia.   -     benzonatate (TESSALON) 200 MG capsule; Take 1 capsule (200 mg total) by mouth 3 (three) times daily as needed for Cough.  Dispense: 30 capsule; Refill: 0  -     POCT COVID-19 Rapid Screening  -     albuterol (PROVENTIL HFA) 90 mcg/actuation inhaler; Inhale 2 puffs into the lungs every 6 (six) hours as needed for Wheezing or Shortness of Breath. Rescue  Dispense: 18 g; Refill: 1    Multiple nevi  Derm referral for skin check.   -     Ambulatory referral/consult to Dermatology; Future; Expected date: 07/28/2022    Healthcare maintenance  -     Comprehensive Metabolic Panel; Future; Expected date: 07/21/2022  -     Lipid Panel; Future; Expected date: 07/21/2022  -      Microalbumin/Creatinine Ratio, Urine; Future; Expected date: 07/21/2022  -     Urinalysis; Future; Expected date: 07/21/2022  -     TSH; Future; Expected date: 07/21/2022    This note was created using [employer] voice recognition software that occasionally misinterprets phrases or words.     Follow up in about 6 months (around 1/21/2023) for HTN.      7/21/2022 GERRY Katz, FNP

## 2022-07-27 ENCOUNTER — TELEPHONE (OUTPATIENT)
Dept: ORTHOPEDICS | Facility: CLINIC | Age: 47
End: 2022-07-27

## 2022-07-27 ENCOUNTER — PATIENT MESSAGE (OUTPATIENT)
Dept: FAMILY MEDICINE | Facility: CLINIC | Age: 47
End: 2022-07-27

## 2022-07-27 ENCOUNTER — OFFICE VISIT (OUTPATIENT)
Dept: ORTHOPEDICS | Facility: CLINIC | Age: 47
End: 2022-07-27
Payer: MEDICAID

## 2022-07-27 VITALS — BODY MASS INDEX: 28.47 KG/M2 | WEIGHT: 221.88 LBS | HEIGHT: 74 IN

## 2022-07-27 DIAGNOSIS — M77.12 LATERAL EPICONDYLITIS OF LEFT ELBOW: Primary | ICD-10-CM

## 2022-07-27 PROCEDURE — 20551 NJX 1 TENDON ORIGIN/INSJ: CPT | Mod: LT,S$GLB,, | Performed by: PHYSICIAN ASSISTANT

## 2022-07-27 PROCEDURE — 99203 PR OFFICE/OUTPT VISIT, NEW, LEVL III, 30-44 MIN: ICD-10-PCS | Mod: 25,S$GLB,, | Performed by: PHYSICIAN ASSISTANT

## 2022-07-27 PROCEDURE — 1160F PR REVIEW ALL MEDS BY PRESCRIBER/CLIN PHARMACIST DOCUMENTED: ICD-10-PCS | Mod: CPTII,S$GLB,, | Performed by: PHYSICIAN ASSISTANT

## 2022-07-27 PROCEDURE — 1159F MED LIST DOCD IN RCRD: CPT | Mod: CPTII,S$GLB,, | Performed by: PHYSICIAN ASSISTANT

## 2022-07-27 PROCEDURE — 3008F PR BODY MASS INDEX (BMI) DOCUMENTED: ICD-10-PCS | Mod: CPTII,S$GLB,, | Performed by: PHYSICIAN ASSISTANT

## 2022-07-27 PROCEDURE — 1160F RVW MEDS BY RX/DR IN RCRD: CPT | Mod: CPTII,S$GLB,, | Performed by: PHYSICIAN ASSISTANT

## 2022-07-27 PROCEDURE — 99203 OFFICE O/P NEW LOW 30 MIN: CPT | Mod: 25,S$GLB,, | Performed by: PHYSICIAN ASSISTANT

## 2022-07-27 PROCEDURE — 4010F ACE/ARB THERAPY RXD/TAKEN: CPT | Mod: CPTII,S$GLB,, | Performed by: PHYSICIAN ASSISTANT

## 2022-07-27 PROCEDURE — 20551 TENDON ORIGIN: L ELBOW: ICD-10-PCS | Mod: LT,S$GLB,, | Performed by: PHYSICIAN ASSISTANT

## 2022-07-27 PROCEDURE — 3008F BODY MASS INDEX DOCD: CPT | Mod: CPTII,S$GLB,, | Performed by: PHYSICIAN ASSISTANT

## 2022-07-27 PROCEDURE — 1159F PR MEDICATION LIST DOCUMENTED IN MEDICAL RECORD: ICD-10-PCS | Mod: CPTII,S$GLB,, | Performed by: PHYSICIAN ASSISTANT

## 2022-07-27 PROCEDURE — 4010F PR ACE/ARB THEARPY RXD/TAKEN: ICD-10-PCS | Mod: CPTII,S$GLB,, | Performed by: PHYSICIAN ASSISTANT

## 2022-07-27 RX ORDER — MELOXICAM 15 MG/1
15 TABLET ORAL DAILY
Qty: 30 TABLET | Refills: 2 | Status: SHIPPED | OUTPATIENT
Start: 2022-07-27 | End: 2022-08-26

## 2022-07-27 RX ORDER — TRIAMCINOLONE ACETONIDE 40 MG/ML
40 INJECTION, SUSPENSION INTRA-ARTICULAR; INTRAMUSCULAR
Status: DISCONTINUED | OUTPATIENT
Start: 2022-07-27 | End: 2022-07-27 | Stop reason: HOSPADM

## 2022-07-27 RX ADMIN — TRIAMCINOLONE ACETONIDE 40 MG: 40 INJECTION, SUSPENSION INTRA-ARTICULAR; INTRAMUSCULAR at 10:07

## 2022-07-27 NOTE — PROCEDURES
Tendon Origin: L elbow    Date/Time: 7/27/2022 10:45 AM  Performed by: Seamus Dougherty PA-C  Authorized by: Seamus Dougherty PA-C     Consent Done?:  Yes (Verbal)  Timeout: prior to procedure the correct patient, procedure, and site was verified    Indications:  Pain  Site marked: the procedure site was marked    Timeout: prior to procedure the correct patient, procedure, and site was verified    Location:  Elbow  Site:  L elbow  Prep: patient was prepped and draped in usual sterile fashion    Needle size:  25 G  Medications:  40 mg triamcinolone acetonide 40 mg/mL  Patient tolerance:  Patient tolerated the procedure well with no immediate complications

## 2022-07-27 NOTE — PROGRESS NOTES
[facility] ORTHOPEDICS  [address]  [place] [address]  Phone: [phone]   [phone]    Patient's PCP: Terry Greenfield NP  Referring Provider: Terry Campos*    Subjective:      Chief Complaint:   Chief Complaint   Patient presents with    Left Elbow - Pain     Left elbow pain that has been ongoing for years. Has not had any treatment.       Past Medical History:   Diagnosis Date    Allergy     Asthma     GERD (gastroesophageal reflux disease)     Hepatitis C virus infection cured after antiviral drug therapy     s/p epclusa, treated / cured - svr24 7/2021       Past Surgical History:   Procedure Laterality Date    FRACTURE SURGERY      right hand       Current Outpatient Medications   Medication Sig    benzonatate (TESSALON) 200 MG capsule Take 1 capsule (200 mg total) by mouth 3 (three) times daily as needed for Cough.    ibuprofen (ADVIL,MOTRIN) 200 MG tablet Take 200 mg by mouth every 6 (six) hours as needed for Pain.    lisinopriL (PRINIVIL,ZESTRIL) 40 MG tablet Take 1 tablet (40 mg total) by mouth once daily.    multivit-mins no.63/iron/folic (M-VIT ORAL) Take by mouth once daily.    omeprazole (PRILOSEC) 20 MG capsule Take 20 mg by mouth once daily. PRN GERD    albuterol (PROVENTIL HFA) 90 mcg/actuation inhaler Inhale 2 puffs into the lungs every 6 (six) hours as needed for Wheezing or Shortness of Breath. Rescue (Patient not taking: Reported on 7/27/2022)    meloxicam (MOBIC) 15 MG tablet Take 1 tablet (15 mg total) by mouth once daily.    venlafaxine (EFFEXOR-XR) 150 MG Cp24 TAKE 1 CAPSULE(150 MG) BY MOUTH EVERY DAY (Patient not taking: No sig reported)    VIVITROL 380 mg SSRR kit      No current facility-administered medications for this visit.       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  He has treated it in multiple different ways over the years with some success.  He has had corticosteroid injections and multiple NSAIDs that seemed to be efficacious.  Unfortunately, he has had this flare-up that has been going on for quite some time.  He is interested in repeat corticosteroid injection today.  He denies any specific injury or trauma.  He is right-hand dominant.    Review of Systems:    Constitutional: Negative for chills, fever and weight loss.   HENT: Negative for congestion.    Eyes: Negative for discharge and redness.   Respiratory: Negative for cough and shortness of breath.    Cardiovascular: Negative for chest pain.   Gastrointestinal: Negative for nausea and vomiting.   Musculoskeletal: See HPI.   Skin: Negative for rash.   Neurological: Negative for headaches.   Endo/Heme/Allergies: Does not bruise/bleed easily.   Psychiatric/Behavioral: The patient is not nervous/anxious.    All other systems reviewed and are negative.       Objective:      Physical Examination:    Vital Signs:  There were no vitals filed for this visit.    Body mass index is 28.49 kg/m².    This a well-developed, well nourished patient in no acute distress.  They are alert and oriented and cooperative to examination.     Left elbow exam:  Skin left elbow is clean dry and intact.  There is no erythema or ecchymosis.  There are no  signs or symptoms of infection.  Patient is neurovascularly intact throughout the left upper extremity.  He can fully flex/extend at the left elbow and pronate/supinate the left forearm.  He can open close left hand into a fist and oppose his left thumb to all digits in his left hand without difficulty.  He is point tender to palpation over his left lateral epicondyle and he localizes this as this point of maximal tenderness.  He has reproduction of symptoms at this location with resisted wrist extension.    Pertinent New Results:        XRAY Report / Interpretation:   Two views were taken of the left elbow today:  AP and lateral views.  They reveal no acute fractures or dislocations.  Visualized soft tissues are unremarkable.  Patient does have some early once that arthrosis in his left elbow joint with some small osteophytes present.      Assessment:       1. Lateral epicondylitis of left elbow      Plan:     Lateral epicondylitis of left elbow  -     X-Ray Elbow 2 Views Left  -     Ambulatory referral/consult to Orthopedics  -     Tendon Origin: L elbow  -     meloxicam (MOBIC) 15 MG tablet; Take 1 tablet (15 mg total) by mouth once daily.  Dispense: 30 tablet; Refill: 2  -     Cancel: Ambulatory referral/consult to Physical/Occupational Therapy; Future; Expected date: 08/03/2022        No follow-ups on file.    I injected the patient's left elbow lateral epicondylar region today with 40 mg of Kenalog and lidocaine.  He tolerated this well.  We will also start him on Mobic 15 mg by mouth once a day with food.  Also placed a left tennis elbow strap on his left forearm and advised to wear this at all times while awake.  I will specific in telling him not to wear while sleeping.  We will also give him a prescription for formal physical therapy to help expedite the healing process for his left lateral epicondylitis.  We will check him back in 6 weeks see how he is doing with all of these treatment modalities.   Advised if he is doing quite well he can simply cancel that appointment follow up with us on an as-needed basis.  I did advise him that lateral epicondylitis can be quite belligerent and require multiple corticosteroid injections to resolve.  I advised if he had a recurrence of symptoms, he would simply follow up with us for repeat injection.  Patient verbalized understanding.        SARAH Crockett, PA-C    This note was created using [employer] voice recognition software that occasionally misinterprets words or phrases.

## 2022-07-28 DIAGNOSIS — J06.9 UPPER RESPIRATORY TRACT INFECTION, UNSPECIFIED TYPE: Primary | ICD-10-CM

## 2022-07-28 RX ORDER — PREDNISONE 20 MG/1
40 TABLET ORAL DAILY
Qty: 6 TABLET | Refills: 0 | Status: SHIPPED | OUTPATIENT
Start: 2022-07-28 | End: 2022-07-31

## 2022-07-28 RX ORDER — AZITHROMYCIN 250 MG/1
TABLET, FILM COATED ORAL
Qty: 6 TABLET | Refills: 0 | Status: SHIPPED | OUTPATIENT
Start: 2022-07-28 | End: 2022-08-02

## 2022-07-30 LAB
ALBUMIN SERPL-MCNC: 4.7 G/DL (ref 4–5)
ALBUMIN/CREAT UR: <7 MG/G CREAT (ref 0–29)
ALBUMIN/GLOB SERPL: 2.8 {RATIO} (ref 1.2–2.2)
ALP SERPL-CCNC: 64 IU/L (ref 44–121)
ALT SERPL-CCNC: 18 IU/L (ref 0–44)
APPEARANCE UR: CLEAR
AST SERPL-CCNC: 17 IU/L (ref 0–40)
BILIRUB SERPL-MCNC: 0.4 MG/DL (ref 0–1.2)
BILIRUB UR QL STRIP: NEGATIVE
BUN SERPL-MCNC: 19 MG/DL (ref 6–24)
BUN/CREAT SERPL: 17 (ref 9–20)
CALCIUM SERPL-MCNC: 9.7 MG/DL (ref 8.7–10.2)
CHLORIDE SERPL-SCNC: 106 MMOL/L (ref 96–106)
CHOLEST SERPL-MCNC: 131 MG/DL (ref 100–199)
CO2 SERPL-SCNC: 23 MMOL/L (ref 20–29)
COLOR UR: YELLOW
CREAT SERPL-MCNC: 1.13 MG/DL (ref 0.76–1.27)
CREAT UR-MCNC: 40.7 MG/DL
EST. GFR  (NO RACE VARIABLE): 81 ML/MIN/1.73
GLOBULIN SER CALC-MCNC: 1.7 G/DL (ref 1.5–4.5)
GLUCOSE SERPL-MCNC: 103 MG/DL (ref 65–99)
GLUCOSE UR QL STRIP: NEGATIVE
HDLC SERPL-MCNC: 46 MG/DL
HGB UR QL STRIP: NEGATIVE
KETONES UR QL STRIP: NEGATIVE
LDLC SERPL CALC-MCNC: 72 MG/DL (ref 0–99)
LEUKOCYTE ESTERASE UR QL STRIP: NEGATIVE
MICRO URNS: NORMAL
MICROALBUMIN UR-MCNC: <3 UG/ML
NITRITE UR QL STRIP: NEGATIVE
PH UR STRIP: 7.5 [PH] (ref 5–7.5)
POTASSIUM SERPL-SCNC: 4.9 MMOL/L (ref 3.5–5.2)
PROT SERPL-MCNC: 6.4 G/DL (ref 6–8.5)
PROT UR QL STRIP: NEGATIVE
SODIUM SERPL-SCNC: 142 MMOL/L (ref 134–144)
SP GR UR STRIP: 1.01 (ref 1–1.03)
TRIGL SERPL-MCNC: 63 MG/DL (ref 0–149)
TSH SERPL DL<=0.005 MIU/L-ACNC: 0.65 UIU/ML (ref 0.45–4.5)
UROBILINOGEN UR STRIP-MCNC: 0.2 MG/DL (ref 0.2–1)
VLDLC SERPL CALC-MCNC: 13 MG/DL (ref 5–40)

## 2022-08-02 ENCOUNTER — PATIENT MESSAGE (OUTPATIENT)
Dept: FAMILY MEDICINE | Facility: CLINIC | Age: 47
End: 2022-08-02

## 2022-08-04 ENCOUNTER — PATIENT MESSAGE (OUTPATIENT)
Dept: FAMILY MEDICINE | Facility: CLINIC | Age: 47
End: 2022-08-04

## 2022-08-05 ENCOUNTER — TELEPHONE (OUTPATIENT)
Dept: FAMILY MEDICINE | Facility: CLINIC | Age: 47
End: 2022-08-05

## 2022-08-05 NOTE — TELEPHONE ENCOUNTER
Patient is still having non productive cough for about 3 weeks .Used inhaler which you prescribed helps with SOB. Steroids and abx helped .  Has been on Lisinopril for 7 years.  Please advise.

## 2022-08-10 ENCOUNTER — CLINICAL SUPPORT (OUTPATIENT)
Dept: REHABILITATION | Facility: HOSPITAL | Age: 47
End: 2022-08-10
Payer: MEDICAID

## 2022-08-10 DIAGNOSIS — M77.12 LATERAL EPICONDYLITIS OF LEFT ELBOW: ICD-10-CM

## 2022-08-10 DIAGNOSIS — M25.522 LEFT ELBOW PAIN: Primary | ICD-10-CM

## 2022-08-10 DIAGNOSIS — M25.632 DECREASED RANGE OF MOTION OF LEFT WRIST: ICD-10-CM

## 2022-08-10 PROCEDURE — 97165 OT EVAL LOW COMPLEX 30 MIN: CPT | Mod: PO

## 2022-08-10 PROCEDURE — 97110 THERAPEUTIC EXERCISES: CPT | Mod: PO

## 2022-08-10 NOTE — PLAN OF CARE
[facility] Occupational Therapy  Initial Evaluation     Date: 8/10/2022  Patient: Augustine Boswell  Chart Number: [id number]    Therapy Diagnosis: L elbow pain, decreased ROM L wrist,   Physician: Seamus Dougherty, *    Physician Orders: Eval and treat, 2-3x/wk x 4 weeks   Medical Diagnosis: M77.12 (ICD-10-CM) - Lateral epicondylitis of left elbow  Evaluation Date: 8/10/2022  Insurance Authorization period Expiration: 12/31/22  Plan of Care Expiration Period: 9/21/22    Visit # / Visits Authortized: 1 / 1  Time In: 3:50 pm  Time Out: 4:30 pm  Total Billable Time: 20 minutes    Surgical Procedure:   N/a     Precautions: Standard       S/P:chronic    Subjective     Involved Side: Left  Dominant Side: Right   Date of Onset: approx 2017  Mechanism of Injury/ History of Current Condition: Pt presented to MD with flare up of 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 Symptoms would be consistently uncomfortable but not severe and gradually worsened. Pt received CSI on 7/27/22, and was issued a tennis elbow brace, and Mobic.Symptoms have resolved, but pt was referred for strengthening/stretching to reduce risk of exacerbations.      Imaging: X rays: They reveal no acute fractures or dislocations.  Visualized soft tissues are unremarkable.  Patient does have some early once that arthrosis in his left elbow joint with some small osteophytes present.     Previous Therapy: none     Patient's Goals for Therapy:  To learn exercises to reduce flare ups     Pain:  Functional Pain Scale Rating 0-10:   0/10 now  0/10 at best  0/10 at worst; 9-10/10 prior to injection-had to go to urgent care;  could not lift arm. Was normally at a 5-6/10 level.   Location: [facility]   Description: Aching, Throbbing and  Occasionally Sharp  Aggravating Factors: certain motions, reaching to grasp objects,  bumping elbow   Easing Factors: rest    Occupation:  AC work   Working presently: employed  Duties: heavy lifting, tool use, carrying, overhead lifting,    Functional Limitations/Social History:    Previous functional status includes: Independent with all ADLs.     Current FunctionalStatus   Home/Living environment : lives with their spouse      Limitation of Functional Status as follows:   ADLs/IADLs: Pt is now able to perform all ADLs and IADLs normally since receiving CSI. Previously, he had difficulty reaching for objects.      Leisure: Push ups, jump rope, abs,    Unable to hit heavy bag due to elbow      Past Medical History/Physical Systems Review:   Augustine Boswell  has a past medical history of Allergy, Asthma, GERD (gastroesophageal reflux disease), and Hepatitis C virus infection cured after antiviral drug therapy.    Augustine Boswell  has a past surgical history that includes Fracture surgery.    Augustine has a current medication list which includes the following prescription(s): albuterol, ibuprofen, lisinopril, meloxicam, multivit-mins no.63/iron/folic, omeprazole, venlafaxine, and vivitrol.    Review of patient's allergies indicates:  No Known Allergies       Objective     Observation/Inspection:  No tenderness to palpation at lat epi or over radial tunnel.     Sensation:  Intact to light touch. Paresthesias reported in L thumb and index finger occasionally at night but has also resolved since the injection.     Edema:  unremarkable      Stress position wrist flexion   Left 42   Right 52      Stress Position  Strength (in pounds):  Setting II   Left Right   Elbow flexed Point of pain NA NA   Elbow flexed Max  103 102   Elbow extended pronated Point of pain  NA NA   Elbow extended pronated Max  120 120            Special Tests: (-) Mill's and MF test; (+) Crozen's along ECRB distal to tendinous insertion        FOTO Score: 69   Typical Result: 71      Treatment     Treatment Time In: 4:10 pm  Treatment  Time Out: 4:30 pm  Total Treatment time separate from Evaluation time:20 min    Betito received the following manual therapy techniques for 5 minutes:     -Performed soft tissue mobilization to L lateral elbow and proximal forearm musculature for 5 minutes to decrease adhesions and improve tensile glide.     Betito received therapeutic exercises for 8 minutes including:  -Stretches: Common extensors, common flexors, cross body    Self Care Management (80383): 7 min Educated pt on dx, anatomy, precautions, aggravating factors, and activity modification. Recommended modifying lifting technique, enlarging handles on tools    Home Exercise Program/Education:  Issued HEP (see patient instructions in EMR) and educated on modality use for pain management . Exercises were reviewed and Betito was able to demonstrate them prior to the end of the session.   Pt received a written copy of exercises to perform at home. Betito demonstrated good  understanding of the education provided.  Pt was advised to perform these exercises free of pain, and to stop performing them if pain occurs.    Patient/Family Education: role of OT, goals for OT,- pt verbalized understanding.        Assessment     Augustine Boswell is a 47 y.o. male referred to outpatient occupational/hand therapy and presents with a medical diagnosis of chronic L lateral epicondylitis, resulting in Paresthesias, pain, edema, decreased A/PROM, strength, and functional use of L non-dominant UE and demonstrates limitations as described in the chart below.     The patient's rehab potential is Good.     Anticipated barriers to occupational therapy: osteophytes in elbow joint  Pt has no cultural, educational or language barriers to learning provided.    Profile and History Assessment of Occupational Performance Level of Clinical Decision Making Complexity Score   Occupational Profile:   Augustine Boswell is a 47 y.o. male who lives with their spouse and is currently employed with [employer] work.  Augustine Boswell has difficulty with reaching/lifting, grasping, and punching heavy bag  affecting his/her daily functional abilities. His/her main goal for therapy is to learn exercises and options to reduce risk of flare-ups.     Comorbidities:    has a past medical history of Allergy, Asthma, GERD (gastroesophageal reflux disease), and Hepatitis C virus infection cured after antiviral drug therapy.    Medical and Therapy History Review:   Expanded               Performance Deficits    Physical:  Joint Mobility  Muscle Endurance  Pain    Cognitive:  Decreased insight into dx and treatment    Psychosocial:    No Deficits     Clinical Decision Making:  low    Assessment Process:  Detailed Assessments    Modification/Need for Assistance:  Minimal-Moderate Modifications/Assistance    Intervention Selection:  Several Treatment Options       low  Based on PMHX, co morbidities , data from assessments and functional level of assistance required with task and clinical presentation directly impacting function.       The following goals were discussed with the patient and patient is in agreement with them as to be addressed in the treatment plan.     Goals:   Short Term Goals: (4 weeks)  1. Pt will be independent with HEP in 2 visits.  2. Pt will report increase stress position wrist flexion on L to 50 degrees.     Long Term Goals: (by discharge)  1. Pt will exhibit an increase (2 points) in the FOTO assessment.  2. Pt will be independent with self management of future exacerbations.       Plan     Certification Period/Plan of care expiration: 8/10/2022 to 9/21/22.    Outpatient Occupational Therapy 1-2 times weekly for 4weeks to include the following interventions:     Modalities to decrease pain (moist heat, paraffin, fluidotherapy, US ), edema control, soft tissue mobilization, manual therapy/joint mobilizations,A/PROM, therapeutic exercises/activities, strengthening, desensitization/sensory re-education, orthotic  fitting/fabrication/training PRN, joint protections/energry conservation/adaptive equipment/activity modification  HEP/education as well as any other treatments deemed necessary based on the patient's needs or progress.    Updates Next Visit: review HEP, initiate US, discuss proper counterforce brace wear, initiate strengthening of intrinsics, proximal musculature, and eccentrics wrist extensor. Progress strengthening as tolerated.    NISA Wylie, CHT

## 2022-08-22 ENCOUNTER — PATIENT MESSAGE (OUTPATIENT)
Dept: FAMILY MEDICINE | Facility: CLINIC | Age: 47
End: 2022-08-22

## 2022-08-22 ENCOUNTER — PATIENT MESSAGE (OUTPATIENT)
Dept: ORTHOPEDICS | Facility: CLINIC | Age: 47
End: 2022-08-22

## 2022-08-22 DIAGNOSIS — M25.521 RIGHT ELBOW PAIN: Primary | ICD-10-CM

## 2022-08-22 NOTE — TELEPHONE ENCOUNTER
Pt received ortho ref on 7/21/22 for L elbow pain. Will pt need to be seen to receive ref for R elbow pain?

## 2022-08-25 ENCOUNTER — OFFICE VISIT (OUTPATIENT)
Dept: ORTHOPEDICS | Facility: CLINIC | Age: 47
End: 2022-08-25
Payer: MEDICAID

## 2022-08-25 VITALS — BODY MASS INDEX: 28.36 KG/M2 | HEIGHT: 74 IN | WEIGHT: 221 LBS

## 2022-08-25 DIAGNOSIS — M77.01 MEDIAL EPICONDYLITIS, RIGHT: ICD-10-CM

## 2022-08-25 DIAGNOSIS — M77.11 LATERAL EPICONDYLITIS, RIGHT ELBOW: Primary | ICD-10-CM

## 2022-08-25 PROCEDURE — 1159F PR MEDICATION LIST DOCUMENTED IN MEDICAL RECORD: ICD-10-PCS | Mod: CPTII,S$GLB,, | Performed by: PHYSICIAN ASSISTANT

## 2022-08-25 PROCEDURE — 1159F MED LIST DOCD IN RCRD: CPT | Mod: CPTII,S$GLB,, | Performed by: PHYSICIAN ASSISTANT

## 2022-08-25 PROCEDURE — 1160F PR REVIEW ALL MEDS BY PRESCRIBER/CLIN PHARMACIST DOCUMENTED: ICD-10-PCS | Mod: CPTII,S$GLB,, | Performed by: PHYSICIAN ASSISTANT

## 2022-08-25 PROCEDURE — 3061F NEG MICROALBUMINURIA REV: CPT | Mod: CPTII,S$GLB,, | Performed by: PHYSICIAN ASSISTANT

## 2022-08-25 PROCEDURE — 20551 TENDON ORIGIN: ICD-10-PCS | Mod: 50,S$GLB,, | Performed by: PHYSICIAN ASSISTANT

## 2022-08-25 PROCEDURE — 99213 PR OFFICE/OUTPT VISIT, EST, LEVL III, 20-29 MIN: ICD-10-PCS | Mod: 25,S$GLB,, | Performed by: PHYSICIAN ASSISTANT

## 2022-08-25 PROCEDURE — 1160F RVW MEDS BY RX/DR IN RCRD: CPT | Mod: CPTII,S$GLB,, | Performed by: PHYSICIAN ASSISTANT

## 2022-08-25 PROCEDURE — 3008F BODY MASS INDEX DOCD: CPT | Mod: CPTII,S$GLB,, | Performed by: PHYSICIAN ASSISTANT

## 2022-08-25 PROCEDURE — 20551 NJX 1 TENDON ORIGIN/INSJ: CPT | Mod: 50,S$GLB,, | Performed by: PHYSICIAN ASSISTANT

## 2022-08-25 PROCEDURE — 3061F PR NEG MICROALBUMINURIA RESULT DOCUMENTED/REVIEW: ICD-10-PCS | Mod: CPTII,S$GLB,, | Performed by: PHYSICIAN ASSISTANT

## 2022-08-25 PROCEDURE — 3066F PR DOCUMENTATION OF TREATMENT FOR NEPHROPATHY: ICD-10-PCS | Mod: CPTII,S$GLB,, | Performed by: PHYSICIAN ASSISTANT

## 2022-08-25 PROCEDURE — 4010F ACE/ARB THERAPY RXD/TAKEN: CPT | Mod: CPTII,S$GLB,, | Performed by: PHYSICIAN ASSISTANT

## 2022-08-25 PROCEDURE — 3008F PR BODY MASS INDEX (BMI) DOCUMENTED: ICD-10-PCS | Mod: CPTII,S$GLB,, | Performed by: PHYSICIAN ASSISTANT

## 2022-08-25 PROCEDURE — 4010F PR ACE/ARB THEARPY RXD/TAKEN: ICD-10-PCS | Mod: CPTII,S$GLB,, | Performed by: PHYSICIAN ASSISTANT

## 2022-08-25 PROCEDURE — 99213 OFFICE O/P EST LOW 20 MIN: CPT | Mod: 25,S$GLB,, | Performed by: PHYSICIAN ASSISTANT

## 2022-08-25 PROCEDURE — 3066F NEPHROPATHY DOC TX: CPT | Mod: CPTII,S$GLB,, | Performed by: PHYSICIAN ASSISTANT

## 2022-08-25 RX ORDER — TRIAMCINOLONE ACETONIDE 40 MG/ML
40 INJECTION, SUSPENSION INTRA-ARTICULAR; INTRAMUSCULAR
Status: DISCONTINUED | OUTPATIENT
Start: 2022-08-25 | End: 2022-08-25 | Stop reason: HOSPADM

## 2022-08-25 RX ADMIN — TRIAMCINOLONE ACETONIDE 40 MG: 40 INJECTION, SUSPENSION INTRA-ARTICULAR; INTRAMUSCULAR at 12:08

## 2022-08-25 NOTE — PROCEDURES
Tendon Origin    Date/Time: 8/25/2022 12:45 PM  Performed by: DAISY Shipley  Authorized by: DAISY Shipley     Consent Done?:  Yes (Verbal)  Timeout: prior to procedure the correct patient, procedure, and site was verified    Indications:  Pain  Site marked: the procedure site was marked    Timeout: prior to procedure the correct patient, procedure, and site was verified    Location: [facility] EPICOND[facility].  Prep: patient was prepped and draped in usual sterile fashion    Needle size:  25 G  Medications:  40 mg triamcinolone acetonide 40 mg/mL  Patient tolerance:  Patient tolerated the procedure well with no immediate complications  Tendon Origin    Date/Time: 8/25/2022 12:45 PM  Performed by: DAISY Shipley  Authorized by: DAISY Shipley     Consent Done?:  Yes (Verbal)  Timeout: prior to procedure the correct patient, procedure, and site was verified    Indications:  Pain  Site marked: the procedure site was marked    Timeout: prior to procedure the correct patient, procedure, and site was verified    Location: [facility] LATERAL EPICONDYLE.  Prep: patient was prepped and draped in usual sterile fashion    Needle size:  25 G  Medications:  40 mg triamcinolone acetonide 40 mg/mL  Patient tolerance:  Patient tolerated the procedure well with no immediate complications

## 2022-08-25 NOTE — PROGRESS NOTES
[facility] ORTHOPEDICS  [address] [facility]  [place] [address]  Phone: [phone]   [phone]    Patient's PCP: Terry Greenfield NP  Referring Provider: Terry Campos*    Subjective:      Chief Complaint:   Chief Complaint   Patient presents with    Right Elbow - Pain     Pt is here with complaints of Right elbow pain x 4 days        Past Medical History:   Diagnosis Date    Allergy     Asthma     GERD (gastroesophageal reflux disease)     Hepatitis C virus infection cured after antiviral drug therapy     s/p epclusa, treated / cured - svr24 7/2021       Past Surgical History:   Procedure Laterality Date    FRACTURE SURGERY      right hand       Current Outpatient Medications   Medication Sig    albuterol (PROVENTIL HFA) 90 mcg/actuation inhaler Inhale 2 puffs into the lungs every 6 (six) hours as needed for Wheezing or Shortness of Breath. Rescue    ibuprofen (ADVIL,MOTRIN) 200 MG tablet Take 200 mg by mouth every 6 (six) hours as needed for Pain.    lisinopriL (PRINIVIL,ZESTRIL) 40 MG tablet Take 1 tablet (40 mg total) by mouth once daily.    meloxicam (MOBIC) 15 MG tablet Take 1 tablet (15 mg total) by mouth once daily.    multivit-mins no.63/iron/folic (M-VIT ORAL) Take by mouth once daily.    omeprazole (PRILOSEC) 20 MG capsule Take 20 mg by mouth once daily. PRN GERD     No current facility-administered medications for this visit.       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 This included reviewing the previous progress notes from our office, review of the patient's last appointment with their primary care provider, review of any visits to the emergency room, and review of any pain management appointments or procedures.    History of present illness: Betito presents to the clinic today as a new patient chief complaint of right lateral elbow pain.     He has had his left elbow treated multiple different ways over the years with some success.  Most recently in July we injected the left elbow.   He has had corticosteroid injections and multiple NSAIDs that seemed to be efficacious.      He is interested in repeat corticosteroid injection today.      He denies any specific injury or trauma.  He is right-hand dominant.      Review of Systems:    Constitutional: Negative for chills, fever and weight loss.   HENT: Negative for congestion.    Eyes: Negative for discharge and redness.   Respiratory: Negative for cough and shortness of breath.    Cardiovascular: Negative for chest pain.   Gastrointestinal: Negative for nausea and vomiting.   Musculoskeletal: See HPI.   Skin: Negative for rash.   Neurological: Negative for headaches.   Endo/Heme/Allergies: Does not bruise/bleed easily.   Psychiatric/Behavioral: The patient is not nervous/anxious.    All other systems reviewed and are negative.       Objective:      Physical Examination:    Vital Signs:  There were no vitals filed for this visit.    Body mass index is 28.37 kg/m².    This a well-developed, well nourished patient in no acute distress.  They are alert and oriented and cooperative to examination.     Examination of the right elbow:  Skin over the right elbow is clean dry and intact.  There is no erythema or ecchymosis.  There are no signs or symptoms of infection.  Patient is  neurovascularly intact throughout the right upper extremity.  He can fully flex/extend at the right elbow and pronate/supinate the right forearm.  He can open close right hand into a fist and oppose his right thumb to all digits in his right hand without difficulty.  He is point tender to palpation over his right lateral epicondyle and he localizes this as this point of maximal tenderness but he also has pain over the medial epicondyle as well.  He has reproduction of symptoms at this location with resisted wrist flexion/extension.      Pertinent New Results:        XRAY Report / Interpretation:   AP lateral views of the right elbow taken today in the office do not demonstrate any osseous abnormalities are significant findings of the visualized soft tissues.          Assessment:       1. Lateral epicondylitis, right elbow    2. Medial epicondylitis, right      Plan:     Lateral epicondylitis, right elbow    Medial epicondylitis, right  -     Ambulatory referral/consult to Orthopedics  -     X-Ray Elbow 2 Views Right        No follow-ups on file.    Patient with new complaint of epicondylitis.  He has symptoms both medial and laterally today.  Exquisitely tender over the lateral epicondyle is specially with resisted extension he does have some mild symptoms with resisted flexion over the medial epicondyle.  We injected both epicondyles today lidocaine and triamcinolone both medial and lateral we did sterile technique, patient tolerated well.  He is enrolled in physical therapy he has been gone to several visits for his left elbow.  We will add his right elbow to his orders.  Will check him back in 6 weeks.    [unfilled]  SARAH Sanford PA-C    This note was created using [employer] voice recognition software that occasionally misinterprets words or phrases.     negative...

## 2022-08-30 NOTE — PROGRESS NOTES
"      Occupational Therapy Daily Treatment Note     Date: 8/31/2022  Name: Augustine Boswell  Clinic Number: [id number]    Therapy Diagnosis: L elbow pain, decreased ROM L wrist,   Physician: Seamus Dougherty, *     Physician Orders: Eval and treat, 2-3x/wk x 4 weeks   Medical Diagnosis: M77.12 (ICD-10-CM) - Lateral epicondylitis of left elbow  Evaluation Date: 8/10/2022  Insurance Authorization period Expiration: 9/21/22  Plan of Care Expiration Period: 9/21/22     Visit # / Visits Authortized: 2 / 8  Time In: 5:05 pm  Time Out: 6:05 pm  Total Billable Time: 60 minutes     Surgical Procedure:   N/a      Precautions: Standard                              S/P:chronic    SUBJECTIVE     Pt reports: his L elbow is feeling better but pt states that he started having pain in his R elbow on both sides. The doctor gave him an injection on the medial side of his R elbow. "There should be orders to add my right arm"   He was  compliant with home exercise program given last session.   Response to previous treatment:Good on L   Functional change: L elbow is feeling better and pt has made conscious effort to stretch and modify activities as able    Pain:   Functional Pain Scale Rating 0-10:   0/10 now  0/10 at best  0/10 at worst; 9-10/10 prior to injection-had to go to urgent care;  could not lift arm. Was normally at a 5-6/10 level    6-7/10 at wost on the R with gripping and lifting, arely   OBJECTIVE     Reviewed MD appointment notes which states to add R elbow lateral and medial epicondylitis to treatment. Assessed R elbow this date.      Stress position wrist flexion   Left 65 (+23)   Right 68 (+16)     Baseline Stress position wrist extension  Left 74  Right 68     Special Tests on R: (+) pain with resisted pronation. (-) with resisted flexor-pronator mass and (-) Crozens      Stress Position  Strength (in pounds):  Setting II    Left Right   Elbow flexed Point of pain NA NA   Elbow flexed Max       103 102   Elbow " extended pronated Point of pain        NA NA   Elbow extended pronated Max          120 120                Treatment     Betito received the treatments listed below:     Direct contact modalities after being cleared for contraindications for 10 minutes:  -Ultrasound:  1 Mhz, 50 % duty cycle, 1.0 w/cm2 for 10 minutes (4 min at R medial elbow and 6 min at lateral R elbow) to decrease pain and improve circulation.      Manual therapy techniques:  for 15 minutes, including:  -STM to medial and lateral R elbow and forearm      Betito received therapeutic exercises for 32 minutes including:  -Stretches: by OT Common extensors, common flexors by OT  -L Wrist extension eccentrics 2#  1/15   -Rows (B) and IR/ER (L)  at side with blue theraband 2/10   -Shoulder extensions with green theraband B 2/10  -measurements and tests      Self Care Management (71021): 3 min Educated pt on d,medial epicondylitis, aggravating factors, and activity modification. Recommended modifying lifting technique, to thumbs up as pain is at both lateral and medial R elbow. Pt to continue L elbow modifications.       Patient Education and Home Exercises      Education provided:   - Issued blue theraband for home use.     Written Home Exercises Provided: Patient instructed to cont prior HEP.  Exercises were reviewed and Betito was able to demonstrate them prior to the end of the session.  Betito demonstrated good  understanding of the HEP provided. See EMR under Patient Instructions for exercises provided during therapy sessions.       Assessment     Pt would continue to benefit from skilled OT. Received MD orders to add R elbow lateral and medial epicondylitis to treatment plan. Pt was not tender to palpation at medial or lateral R epicondyle but provocative testing increased symptoms after testing. Significant improvement in stress position wrist flexion bilaterally compared to initial eval measures. Pt with painful resisted pronation and (+) Crozen's on R elbow  and reports improvement after injection as well. Initiated eccentrics on L wrist with pt reporting burning at end, so was deferred after 15 reps. Initiated proximal strengthening and issued blue theraband for pt to perform at home. Pt participates in workouts but does not know proper exercises to improve proximal stability.     - Progress towards goals: Good  STG 1 and 2 met    Betito is progressing well towards his goals and there are no updates to goals at this time. Pt prognosis is Good.     Pt will continue to benefit from skilled outpatient occupational therapy to address the deficits listed in the problem list on initial evaluation provide pt/family education and to maximize pt's level of independence in the home and community environment.     Pt's spiritual, cultural and educational needs considered and pt agreeable to plan of care and goals.    Anticipated barriers to occupational therapy: osteophytes in L elbow joint     Goals:   Short Term Goals: (4 weeks)  1. Pt will be independent with HEP in 2 visits. (Met 9/1/22)   2. Pt will report increase stress position wrist flexion on L to 50 degrees. (Met 9/1/22)   3. Pt will report 3-4/10 pain at R elbow at worst.      Long Term Goals: (by discharge)  1. Pt will exhibit an increase (2 points) in the FOTO assessment.  2. Pt will be independent with self management of future exacerbations.   3. Pt will report 1-210 pain at R elbow at worst.    PLAN     Certification Period/Plan of care expiration: 8/10/2022 to 9/21/22.    Continue Occupational Therapy 1-2 times per week through certification period to decrease pain and edema, and increase A/PROM, strength, and functional use of B upper extremity.     Updates/Grading for next session: progress as tolerated.        NISA Wylie, CHT

## 2022-08-31 ENCOUNTER — CLINICAL SUPPORT (OUTPATIENT)
Dept: REHABILITATION | Facility: HOSPITAL | Age: 47
End: 2022-08-31
Payer: MEDICAID

## 2022-08-31 DIAGNOSIS — M25.522 LEFT ELBOW PAIN: Primary | ICD-10-CM

## 2022-08-31 DIAGNOSIS — M25.632 DECREASED RANGE OF MOTION OF LEFT WRIST: ICD-10-CM

## 2022-08-31 PROCEDURE — 97035 APP MDLTY 1+ULTRASOUND EA 15: CPT | Mod: PO

## 2022-08-31 PROCEDURE — 97140 MANUAL THERAPY 1/> REGIONS: CPT | Mod: PO

## 2022-08-31 PROCEDURE — 97110 THERAPEUTIC EXERCISES: CPT | Mod: PO

## 2022-09-19 ENCOUNTER — PATIENT MESSAGE (OUTPATIENT)
Dept: FAMILY MEDICINE | Facility: CLINIC | Age: 47
End: 2022-09-19

## 2022-09-19 DIAGNOSIS — I10 ESSENTIAL HYPERTENSION: ICD-10-CM

## 2022-09-20 ENCOUNTER — PATIENT MESSAGE (OUTPATIENT)
Dept: FAMILY MEDICINE | Facility: CLINIC | Age: 47
End: 2022-09-20

## 2022-09-21 ENCOUNTER — PATIENT MESSAGE (OUTPATIENT)
Dept: FAMILY MEDICINE | Facility: CLINIC | Age: 47
End: 2022-09-21

## 2022-09-21 DIAGNOSIS — I10 ESSENTIAL HYPERTENSION: ICD-10-CM

## 2022-09-21 RX ORDER — LISINOPRIL 40 MG/1
40 TABLET ORAL DAILY
Qty: 90 TABLET | Refills: 1 | Status: SHIPPED | OUTPATIENT
Start: 2022-09-21 | End: 2022-12-13 | Stop reason: SDUPTHER

## 2022-10-19 ENCOUNTER — DOCUMENTATION ONLY (OUTPATIENT)
Dept: REHABILITATION | Facility: HOSPITAL | Age: 47
End: 2022-10-19
Payer: MEDICAID

## 2022-10-19 PROBLEM — M25.632 DECREASED RANGE OF MOTION OF LEFT WRIST: Status: RESOLVED | Noted: 2022-08-10 | Resolved: 2022-10-19

## 2022-10-19 PROBLEM — M25.522 LEFT ELBOW PAIN: Status: RESOLVED | Noted: 2022-08-10 | Resolved: 2022-10-19

## 2022-10-19 NOTE — PROGRESS NOTES
Occupational Therapy Discharge Note    10/19/22    Pt was referred by Seamus Dougherty and seen for initial evaluation on 8/10/22 for Lateral epicondylitis of left elbow. Pt was seen for 2  OT visits for treatment of L elbow pain, decreased ROM L wrist. Please see last note, dated 22,  for last objective measures as well as goal achievements/modifications. Pt has not returned to therapy and pt's POC  on 22. Have spoken to patient who stated his elbow symptoms have resolved.     Discharge OT services to [facility]    NISA Wylie, MJT

## 2022-10-28 ENCOUNTER — OFFICE VISIT (OUTPATIENT)
Dept: ORTHOPEDICS | Facility: CLINIC | Age: 47
End: 2022-10-28
Payer: MEDICAID

## 2022-10-28 VITALS — BODY MASS INDEX: 28.36 KG/M2 | HEIGHT: 74 IN | WEIGHT: 221 LBS

## 2022-10-28 DIAGNOSIS — M77.12 LATERAL EPICONDYLITIS OF LEFT ELBOW: Primary | ICD-10-CM

## 2022-10-28 PROCEDURE — 20551 NJX 1 TENDON ORIGIN/INSJ: CPT | Mod: LT,S$GLB,, | Performed by: PHYSICIAN ASSISTANT

## 2022-10-28 PROCEDURE — 1160F RVW MEDS BY RX/DR IN RCRD: CPT | Mod: CPTII,S$GLB,, | Performed by: PHYSICIAN ASSISTANT

## 2022-10-28 PROCEDURE — 3066F PR DOCUMENTATION OF TREATMENT FOR NEPHROPATHY: ICD-10-PCS | Mod: CPTII,S$GLB,, | Performed by: PHYSICIAN ASSISTANT

## 2022-10-28 PROCEDURE — 99213 OFFICE O/P EST LOW 20 MIN: CPT | Mod: 25,S$GLB,, | Performed by: PHYSICIAN ASSISTANT

## 2022-10-28 PROCEDURE — 3061F PR NEG MICROALBUMINURIA RESULT DOCUMENTED/REVIEW: ICD-10-PCS | Mod: CPTII,S$GLB,, | Performed by: PHYSICIAN ASSISTANT

## 2022-10-28 PROCEDURE — 1160F PR REVIEW ALL MEDS BY PRESCRIBER/CLIN PHARMACIST DOCUMENTED: ICD-10-PCS | Mod: CPTII,S$GLB,, | Performed by: PHYSICIAN ASSISTANT

## 2022-10-28 PROCEDURE — 1159F PR MEDICATION LIST DOCUMENTED IN MEDICAL RECORD: ICD-10-PCS | Mod: CPTII,S$GLB,, | Performed by: PHYSICIAN ASSISTANT

## 2022-10-28 PROCEDURE — 99213 PR OFFICE/OUTPT VISIT, EST, LEVL III, 20-29 MIN: ICD-10-PCS | Mod: 25,S$GLB,, | Performed by: PHYSICIAN ASSISTANT

## 2022-10-28 PROCEDURE — 4010F PR ACE/ARB THEARPY RXD/TAKEN: ICD-10-PCS | Mod: CPTII,S$GLB,, | Performed by: PHYSICIAN ASSISTANT

## 2022-10-28 PROCEDURE — 4010F ACE/ARB THERAPY RXD/TAKEN: CPT | Mod: CPTII,S$GLB,, | Performed by: PHYSICIAN ASSISTANT

## 2022-10-28 PROCEDURE — 3061F NEG MICROALBUMINURIA REV: CPT | Mod: CPTII,S$GLB,, | Performed by: PHYSICIAN ASSISTANT

## 2022-10-28 PROCEDURE — 3066F NEPHROPATHY DOC TX: CPT | Mod: CPTII,S$GLB,, | Performed by: PHYSICIAN ASSISTANT

## 2022-10-28 PROCEDURE — 1159F MED LIST DOCD IN RCRD: CPT | Mod: CPTII,S$GLB,, | Performed by: PHYSICIAN ASSISTANT

## 2022-10-28 PROCEDURE — 20551 TENDON ORIGIN: L ELBOW: ICD-10-PCS | Mod: LT,S$GLB,, | Performed by: PHYSICIAN ASSISTANT

## 2022-10-28 RX ORDER — TRIAMCINOLONE ACETONIDE 40 MG/ML
40 INJECTION, SUSPENSION INTRA-ARTICULAR; INTRAMUSCULAR
Status: DISCONTINUED | OUTPATIENT
Start: 2022-10-28 | End: 2022-10-28 | Stop reason: HOSPADM

## 2022-10-28 RX ADMIN — TRIAMCINOLONE ACETONIDE 40 MG: 40 INJECTION, SUSPENSION INTRA-ARTICULAR; INTRAMUSCULAR at 10:10

## 2022-10-28 NOTE — PROGRESS NOTES
[facility]  [address]  [place] [address]  Phone: [phone]   [phone]    Patient's PCP: Terry Greenfield NP  Referring Provider: No ref. provider found    Subjective:      Chief Complaint:   Chief Complaint   Patient presents with    Left Elbow - Pain     Left elbow pain that has been ongoing. Received inj 07/27/22 which offered relief until about a week or so ago.       Past Medical History:   Diagnosis Date    Allergy     Asthma     GERD (gastroesophageal reflux disease)     Hepatitis C virus infection cured after antiviral drug therapy     s/p epclusa, treated / cured - svr24 7/2021       Past Surgical History:   Procedure Laterality Date    FRACTURE SURGERY      right hand       Current Outpatient Medications   Medication Sig    albuterol (PROVENTIL HFA) 90 mcg/actuation inhaler Inhale 2 puffs into the lungs every 6 (six) hours as needed for Wheezing or Shortness of Breath. Rescue    ibuprofen (ADVIL,MOTRIN) 200 MG tablet Take 200 mg by mouth every 6 (six) hours as needed for Pain.    lisinopriL (PRINIVIL,ZESTRIL) 40 MG tablet Take 1 tablet (40 mg total) by mouth once daily.    multivit-mins no.63/iron/folic (M-VIT ORAL) Take by mouth once daily.    omeprazole (PRILOSEC) 20 MG capsule Take 20 mg by mouth once daily. PRN GERD     No current facility-administered medications for this visit.       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  He was seen for this once before approximately 3 months ago where we administered a corticosteroid injection and sent him to physical therapy.  Reports he had great relief with this.  He does report he did not take much if any of his Mobic anti-inflammatory or wear his tennis elbow strap.  He denies any new injury or trauma.  Reports this flare-up on his left lateral elbow is begun over the past week.  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    Review of Systems:    Constitutional: Negative for chills, fever and weight loss.   HENT: Negative for congestion.    Eyes: Negative for discharge and redness.   Respiratory: Negative for cough and shortness of breath.    Cardiovascular: Negative for chest pain.   Gastrointestinal: Negative for nausea and vomiting.   Musculoskeletal: See HPI.   Skin: Negative for rash.   Neurological: Negative for headaches.   Endo/Heme/Allergies: Does not bruise/bleed easily.   Psychiatric/Behavioral: The patient is not nervous/anxious.    All other systems reviewed and are negative.       Objective:      Physical Examination:    Vital Signs:  There were no vitals filed for this visit.    Body mass index is 28.37 kg/m².    This a well-developed, well nourished patient in no acute distress.  They are alert and oriented and cooperative to examination.     Left elbow exam:  Skin to his left elbow is clean dry and intact.  There is no erythema or ecchymosis.  There are no signs or symptoms of infection.  Patient is neurovascular intact throughout the left upper extremity.  He can fully open and close his left hand into a fist.  He can oppose the thumb to all digits in his left hand.  He can flex/extend at the left wrist and pronate/supinate the left forearm without difficulty.  He can flex/extend at the left elbow without difficulty.  He is point tender to  palpation of his left lateral epicondyle he localizes as his area of maximal tenderness.  He has reproduction of pain at this location with resisted left wrist extension.  He has no medial epicondylar tenderness.    Pertinent New Results:        XRAY Report / Interpretation:   No new radiographs were taken on today's clinic visit.      Assessment:       1. Lateral epicondylitis of left elbow      Plan:     Lateral epicondylitis of left elbow  -     Tendon Sheath  -     Ambulatory referral/consult to Physical/Occupational Therapy; Future; Expected date: 11/04/2022  -     Tendon Origin: L elbow      Follow up if symptoms worsen or fail to improve.    I injected the patient's left elbow lateral epicondylar region today with 40 mg of Kenalog and lidocaine.  He will begin use of his tennis elbow strap to be worn all times while awake.  I specific and advising him to not wear while sleeping.  He will start his Mobic 15 mg by mouth once a day.  Also given a prescription for physical therapy to help expedite the healing and hopefully discourage recurrence.  We will see him back if he does have any recurrence of symptoms.        Seamus Dougherty, SARAH, PA-C    This note was created using [employer] voice recognition software that occasionally misinterprets words or phrases.

## 2022-10-28 NOTE — PROCEDURES
Tendon Origin: L elbow    Date/Time: 10/28/2022 10:15 AM  Performed by: Seamus Dougherty PA-C  Authorized by: Seamus Dougherty PA-C     Consent Done?:  Yes (Verbal)  Timeout: prior to procedure the correct patient, procedure, and site was verified    Indications:  Pain  Site marked: the procedure site was marked    Timeout: prior to procedure the correct patient, procedure, and site was verified    Location:  Elbow  Site:  L elbow  Prep: patient was prepped and draped in usual sterile fashion    Needle size:  25 G  Medications:  40 mg triamcinolone acetonide 40 mg/mL  Patient tolerance:  Patient tolerated the procedure well with no immediate complications

## 2022-11-28 ENCOUNTER — PATIENT MESSAGE (OUTPATIENT)
Dept: FAMILY MEDICINE | Facility: CLINIC | Age: 47
End: 2022-11-28

## 2022-12-13 ENCOUNTER — OFFICE VISIT (OUTPATIENT)
Dept: FAMILY MEDICINE | Facility: CLINIC | Age: 47
End: 2022-12-13
Payer: MEDICAID

## 2022-12-13 VITALS
OXYGEN SATURATION: 99 % | SYSTOLIC BLOOD PRESSURE: 137 MMHG | HEART RATE: 86 BPM | DIASTOLIC BLOOD PRESSURE: 86 MMHG | BODY MASS INDEX: 29.07 KG/M2 | WEIGHT: 226.5 LBS | TEMPERATURE: 98 F | HEIGHT: 74 IN

## 2022-12-13 DIAGNOSIS — G44.209 TENSION HEADACHE: Primary | ICD-10-CM

## 2022-12-13 DIAGNOSIS — M25.521 RIGHT ELBOW PAIN: ICD-10-CM

## 2022-12-13 DIAGNOSIS — I10 ESSENTIAL HYPERTENSION: ICD-10-CM

## 2022-12-13 DIAGNOSIS — E78.2 MIXED HYPERLIPIDEMIA: ICD-10-CM

## 2022-12-13 PROCEDURE — 3075F PR MOST RECENT SYSTOLIC BLOOD PRESS GE 130-139MM HG: ICD-10-PCS | Mod: CPTII,,, | Performed by: NURSE PRACTITIONER

## 2022-12-13 PROCEDURE — 3061F PR NEG MICROALBUMINURIA RESULT DOCUMENTED/REVIEW: ICD-10-PCS | Mod: CPTII,,, | Performed by: NURSE PRACTITIONER

## 2022-12-13 PROCEDURE — 3008F BODY MASS INDEX DOCD: CPT | Mod: CPTII,,, | Performed by: NURSE PRACTITIONER

## 2022-12-13 PROCEDURE — 1159F MED LIST DOCD IN RCRD: CPT | Mod: CPTII,,, | Performed by: NURSE PRACTITIONER

## 2022-12-13 PROCEDURE — 3066F PR DOCUMENTATION OF TREATMENT FOR NEPHROPATHY: ICD-10-PCS | Mod: CPTII,,, | Performed by: NURSE PRACTITIONER

## 2022-12-13 PROCEDURE — 99214 OFFICE O/P EST MOD 30 MIN: CPT | Mod: S$PBB,,, | Performed by: NURSE PRACTITIONER

## 2022-12-13 PROCEDURE — 1159F PR MEDICATION LIST DOCUMENTED IN MEDICAL RECORD: ICD-10-PCS | Mod: CPTII,,, | Performed by: NURSE PRACTITIONER

## 2022-12-13 PROCEDURE — 3079F PR MOST RECENT DIASTOLIC BLOOD PRESSURE 80-89 MM HG: ICD-10-PCS | Mod: CPTII,,, | Performed by: NURSE PRACTITIONER

## 2022-12-13 PROCEDURE — 3061F NEG MICROALBUMINURIA REV: CPT | Mod: CPTII,,, | Performed by: NURSE PRACTITIONER

## 2022-12-13 PROCEDURE — 3008F PR BODY MASS INDEX (BMI) DOCUMENTED: ICD-10-PCS | Mod: CPTII,,, | Performed by: NURSE PRACTITIONER

## 2022-12-13 PROCEDURE — 4010F ACE/ARB THERAPY RXD/TAKEN: CPT | Mod: CPTII,,, | Performed by: NURSE PRACTITIONER

## 2022-12-13 PROCEDURE — 4010F PR ACE/ARB THEARPY RXD/TAKEN: ICD-10-PCS | Mod: CPTII,,, | Performed by: NURSE PRACTITIONER

## 2022-12-13 PROCEDURE — 99215 OFFICE O/P EST HI 40 MIN: CPT | Performed by: NURSE PRACTITIONER

## 2022-12-13 PROCEDURE — 99214 PR OFFICE/OUTPT VISIT, EST, LEVL IV, 30-39 MIN: ICD-10-PCS | Mod: S$PBB,,, | Performed by: NURSE PRACTITIONER

## 2022-12-13 PROCEDURE — 1160F PR REVIEW ALL MEDS BY PRESCRIBER/CLIN PHARMACIST DOCUMENTED: ICD-10-PCS | Mod: CPTII,,, | Performed by: NURSE PRACTITIONER

## 2022-12-13 PROCEDURE — 3075F SYST BP GE 130 - 139MM HG: CPT | Mod: CPTII,,, | Performed by: NURSE PRACTITIONER

## 2022-12-13 PROCEDURE — 1160F RVW MEDS BY RX/DR IN RCRD: CPT | Mod: CPTII,,, | Performed by: NURSE PRACTITIONER

## 2022-12-13 PROCEDURE — 3066F NEPHROPATHY DOC TX: CPT | Mod: CPTII,,, | Performed by: NURSE PRACTITIONER

## 2022-12-13 PROCEDURE — 3079F DIAST BP 80-89 MM HG: CPT | Mod: CPTII,,, | Performed by: NURSE PRACTITIONER

## 2022-12-13 RX ORDER — NAPROXEN 500 MG/1
500 TABLET ORAL 2 TIMES DAILY PRN
Qty: 30 TABLET | Refills: 0 | Status: SHIPPED | OUTPATIENT
Start: 2022-12-13 | End: 2023-05-03

## 2022-12-13 RX ORDER — LISINOPRIL 40 MG/1
40 TABLET ORAL DAILY
Qty: 90 TABLET | Refills: 1 | Status: SHIPPED | OUTPATIENT
Start: 2022-12-13 | End: 2023-05-25

## 2022-12-13 NOTE — PROGRESS NOTES
SUBJECTIVE:      Patient ID: Augustine Boswell is a 47 y.o. male.    Chief Complaint: Headache    47-year-old male presents to the clinic with complaints of headaches.      Headaches have been intermittent for a couple of years.  Located to the occipital region.  No new changes or features.  Last headache 1 month ago.  Associated symptoms include left leg twitching and nausea.  The headache resolves with BC powder or Aleve.  Hx of hypertension, but BP is not elevated.  He drinks one cup of coffee in the morning.  No drug use in 1 year.  Denies neck pain.     Right elbow acting up again, hx of lateral epicondylitis, symptoms became worse approx 1 week ago, but are starting improve.  Denies new injury or trauma. Requesting referral back to ortho.     Headache   This is a recurrent problem. The current episode started more than 1 year ago. The problem occurs intermittently. The problem has been waxing and waning. The pain is located in the Occipital region. The pain does not radiate. The pain quality is similar to prior headaches. The quality of the pain is described as shooting. The pain is at a severity of 0/10. The patient is experiencing no pain. Pertinent negatives include no abdominal pain, back pain, coughing, dizziness, ear pain, eye pain, fever, nausea, neck pain, numbness, seizures, sinus pressure, sore throat, vomiting or weakness. Nothing aggravates the symptoms.     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 Rescue 18 g 1    ibuprofen (ADVIL,MOTRIN) 200 MG tablet Take 200 mg by mouth every 6 (six) hours as needed for Pain.      lisinopriL (PRINIVIL,ZESTRIL) 40 MG tablet Take 1 tablet (40 mg total) by mouth once daily. 90 tablet 1    multivit-mins no.63/iron/folic (M-VIT ORAL) Take by mouth once daily.      omeprazole (PRILOSEC) 20 MG capsule Take 20 mg by mouth once daily. PRN GERD      naproxen (NAPROSYN) 500 MG tablet Take 1 tablet (500 mg total) by mouth 2 (two) times daily as needed (headaches). 30 tablet 0     No current facility-administered medications for this visit.     Review of patient's allergies indicates:  No Known Allergies   Past Medical History:   Diagnosis Date    Allergy     Asthma     GERD (gastroesophageal reflux disease)     Hepatitis C virus infection cured after antiviral drug therapy     s/p epclusa, treated / cured - svr24 7/2021     Past Surgical History:   Procedure Laterality Date    FRACTURE SURGERY      right hand       Review of Systems   Constitutional:  Negative for activity change, appetite change, chills, diaphoresis, fatigue, fever and unexpected weight change.   HENT:  Negative for congestion, ear pain, sinus pressure, sore throat, trouble swallowing and voice change.    Eyes:  Negative for pain, discharge and visual disturbance.   Respiratory:  Negative for cough, chest tightness, shortness of breath and wheezing.    Cardiovascular:  Negative for chest pain and palpitations.   Gastrointestinal:  Negative for abdominal pain, constipation, diarrhea, nausea and vomiting.   Genitourinary:  Negative for difficulty urinating, flank pain, frequency and urgency.   Musculoskeletal:  Negative for back pain, joint swelling and neck pain.        Right elbow pain   Skin:  Negative for color change and rash.  "  Neurological:  Positive for headaches. Negative for dizziness, seizures, syncope, weakness and numbness.   Hematological:  Negative for adenopathy.   Psychiatric/Behavioral:  Negative for dysphoric mood and sleep disturbance. The patient is not nervous/anxious.     OBJECTIVE:      Vitals:    12/13/22 1506   BP: 137/86   BP Location: Left arm   Patient Position: Sitting   BP Method: Medium (Manual)   Pulse: 86   Temp: 98.1 °F (36.7 °C)   TempSrc: Oral   SpO2: 99%   Weight: 102.7 kg (226 lb 8 oz)   Height: 6' 2" (1.88 m)     Physical Exam  Vitals and nursing note reviewed.   Constitutional:       General: He is awake. He is not in acute distress.     Appearance: Normal appearance. He is overweight. He is not ill-appearing, toxic-appearing or diaphoretic.   HENT:      Head: Normocephalic and atraumatic.      Nose: Nose normal.   Eyes:      General: Lids are normal. Gaze aligned appropriately.      Conjunctiva/sclera: Conjunctivae normal.      Right eye: Right conjunctiva is not injected.      Left eye: Left conjunctiva is not injected.      Pupils: Pupils are equal, round, and reactive to light.   Cardiovascular:      Rate and Rhythm: Normal rate and regular rhythm.      Pulses: Normal pulses.      Heart sounds: Normal heart sounds, S1 normal and S2 normal. No murmur heard.    No friction rub. No gallop.   Pulmonary:      Effort: Pulmonary effort is normal. No respiratory distress.      Breath sounds: Normal breath sounds. No stridor. No decreased breath sounds, wheezing, rhonchi or rales.   Chest:      Chest wall: No tenderness.   Musculoskeletal:      Right elbow: Normal range of motion. Tenderness present.      Cervical back: Neck supple.      Right lower leg: No edema.      Left lower leg: No edema.      Comments: Pain worse with extension and internal rotation.    Lymphadenopathy:      Cervical: No cervical adenopathy.   Skin:     General: Skin is warm and dry.      Capillary Refill: Capillary refill takes less " than 2 seconds.      Findings: No erythema or rash.   Neurological:      Mental Status: He is alert and oriented to person, place, and time. Mental status is at baseline.      GCS: GCS eye subscore is 4. GCS verbal subscore is 5. GCS motor subscore is 6.      Cranial Nerves: Cranial nerves 2-12 are intact.      Motor: Motor function is intact.   Psychiatric:         Attention and Perception: Attention normal.         Mood and Affect: Mood normal.         Speech: Speech normal.         Behavior: Behavior normal. Behavior is cooperative.         Thought Content: Thought content normal.         Judgment: Judgment normal.      Assessment:       1. Tension headache    2. Right elbow pain    3. Mixed hyperlipidemia    4. Essential hypertension        Plan:       Tension headache  Suspect he is having tension headaches vs cervical paraspinal muscle spasms.  Take Naproxen prn.  Currently no new feature or changes.  Last headache 1 month ago.  No acute neurological deficits.  Will monitor for now, if symptoms worsen or change will get MRI brain.  -     naproxen (NAPROSYN) 500 MG tablet; Take 1 tablet (500 mg total) by mouth 2 (two) times daily as needed (headaches).  Dispense: 30 tablet; Refill: 0    Right elbow pain  Referral provided at patient request. He would like a steroid injection.  Continue Meloxicam or Naproxen prn pain, do not take together.   -     Ambulatory referral/consult to Orthopedics; Future; Expected date: 12/20/2022    Mixed hyperlipidemia  -     Comprehensive Metabolic Panel; Future; Expected date: 12/13/2022  -     Lipid Panel; Future; Expected date: 12/13/2022    Essential hypertension  -     Comprehensive Metabolic Panel; Future; Expected date: 12/13/2022  -     Lipid Panel; Future; Expected date: 12/13/2022    This note was created using [employer] voice recognition software that occasionally misinterprets phrases or words.     Follow up for Keep follow-up in February. .      12/13/2022 Terry  Gin, GERRY, FNP

## 2022-12-19 ENCOUNTER — OFFICE VISIT (OUTPATIENT)
Dept: ORTHOPEDICS | Facility: CLINIC | Age: 47
End: 2022-12-19
Payer: MEDICAID

## 2022-12-19 VITALS
DIASTOLIC BLOOD PRESSURE: 66 MMHG | WEIGHT: 226 LBS | BODY MASS INDEX: 29 KG/M2 | SYSTOLIC BLOOD PRESSURE: 130 MMHG | HEIGHT: 74 IN

## 2022-12-19 DIAGNOSIS — M75.21 BICEPS TENDINITIS ON RIGHT: Primary | ICD-10-CM

## 2022-12-19 PROCEDURE — 3075F SYST BP GE 130 - 139MM HG: CPT | Mod: CPTII,S$GLB,, | Performed by: PHYSICIAN ASSISTANT

## 2022-12-19 PROCEDURE — 3061F NEG MICROALBUMINURIA REV: CPT | Mod: CPTII,S$GLB,, | Performed by: PHYSICIAN ASSISTANT

## 2022-12-19 PROCEDURE — 1160F RVW MEDS BY RX/DR IN RCRD: CPT | Mod: CPTII,S$GLB,, | Performed by: PHYSICIAN ASSISTANT

## 2022-12-19 PROCEDURE — 1159F PR MEDICATION LIST DOCUMENTED IN MEDICAL RECORD: ICD-10-PCS | Mod: CPTII,S$GLB,, | Performed by: PHYSICIAN ASSISTANT

## 2022-12-19 PROCEDURE — 1159F MED LIST DOCD IN RCRD: CPT | Mod: CPTII,S$GLB,, | Performed by: PHYSICIAN ASSISTANT

## 2022-12-19 PROCEDURE — 99213 OFFICE O/P EST LOW 20 MIN: CPT | Mod: S$GLB,,, | Performed by: PHYSICIAN ASSISTANT

## 2022-12-19 PROCEDURE — 3075F PR MOST RECENT SYSTOLIC BLOOD PRESS GE 130-139MM HG: ICD-10-PCS | Mod: CPTII,S$GLB,, | Performed by: PHYSICIAN ASSISTANT

## 2022-12-19 PROCEDURE — 3061F PR NEG MICROALBUMINURIA RESULT DOCUMENTED/REVIEW: ICD-10-PCS | Mod: CPTII,S$GLB,, | Performed by: PHYSICIAN ASSISTANT

## 2022-12-19 PROCEDURE — 1160F PR REVIEW ALL MEDS BY PRESCRIBER/CLIN PHARMACIST DOCUMENTED: ICD-10-PCS | Mod: CPTII,S$GLB,, | Performed by: PHYSICIAN ASSISTANT

## 2022-12-19 PROCEDURE — 3078F PR MOST RECENT DIASTOLIC BLOOD PRESSURE < 80 MM HG: ICD-10-PCS | Mod: CPTII,S$GLB,, | Performed by: PHYSICIAN ASSISTANT

## 2022-12-19 PROCEDURE — 3066F NEPHROPATHY DOC TX: CPT | Mod: CPTII,S$GLB,, | Performed by: PHYSICIAN ASSISTANT

## 2022-12-19 PROCEDURE — 3066F PR DOCUMENTATION OF TREATMENT FOR NEPHROPATHY: ICD-10-PCS | Mod: CPTII,S$GLB,, | Performed by: PHYSICIAN ASSISTANT

## 2022-12-19 PROCEDURE — 4010F PR ACE/ARB THEARPY RXD/TAKEN: ICD-10-PCS | Mod: CPTII,S$GLB,, | Performed by: PHYSICIAN ASSISTANT

## 2022-12-19 PROCEDURE — 3078F DIAST BP <80 MM HG: CPT | Mod: CPTII,S$GLB,, | Performed by: PHYSICIAN ASSISTANT

## 2022-12-19 PROCEDURE — 3008F BODY MASS INDEX DOCD: CPT | Mod: CPTII,S$GLB,, | Performed by: PHYSICIAN ASSISTANT

## 2022-12-19 PROCEDURE — 4010F ACE/ARB THERAPY RXD/TAKEN: CPT | Mod: CPTII,S$GLB,, | Performed by: PHYSICIAN ASSISTANT

## 2022-12-19 PROCEDURE — 3008F PR BODY MASS INDEX (BMI) DOCUMENTED: ICD-10-PCS | Mod: CPTII,S$GLB,, | Performed by: PHYSICIAN ASSISTANT

## 2022-12-19 PROCEDURE — 99213 PR OFFICE/OUTPT VISIT, EST, LEVL III, 20-29 MIN: ICD-10-PCS | Mod: S$GLB,,, | Performed by: PHYSICIAN ASSISTANT

## 2022-12-19 RX ORDER — METHYLPREDNISOLONE 4 MG/1
TABLET ORAL
Qty: 1 EACH | Refills: 0 | Status: SHIPPED | OUTPATIENT
Start: 2022-12-19 | End: 2023-05-02

## 2022-12-19 NOTE — PROGRESS NOTES
[facility]THOPED[facility]  [address]  [place] [address]  Phone: [phone]   [phone]    Patient's PCP: Terry Greenfield NP  Referring Provider: Terry Campos*    Subjective:      Chief Complaint:   Chief Complaint   Patient presents with    Right Elbow - Pain     RT elbow pain for about a month or so, no injury. States pain is on medial side of elbow, painful to use at times.        Past Medical History:   Diagnosis Date    Allergy     Asthma     GERD (gastroesophageal reflux disease)     Hepatitis C virus infection cured after antiviral drug therapy     s/p epclusa, treated / cured - svr24 7/2021       Past Surgical History:   Procedure Laterality Date    FRACTURE SURGERY      right hand       Current Outpatient Medications   Medication Sig    albuterol (PROVENTIL HFA) 90 mcg/actuation inhaler Inhale 2 puffs into the lungs every 6 (six) hours as needed for Wheezing or Shortness of Breath. Rescue    ibuprofen (ADVIL,MOTRIN) 200 MG tablet Take 200 mg by mouth every 6 (six) hours as needed for Pain.    lisinopriL (PRINIVIL,ZESTRIL) 40 MG tablet Take 1 tablet (40 mg total) by mouth once daily.    multivit-mins no.63/iron/folic (M-VIT ORAL) Take by mouth once daily.    naproxen (NAPROSYN) 500 MG tablet Take 1 tablet (500 mg total) by mouth 2 (two) times daily as needed (headaches).    omeprazole (PRILOSEC) 20 MG capsule Take 20 mg by mouth once daily. PRN GERD    methylPREDNISolone (MEDROL DOSEPACK) 4 mg tablet use as directed     No current facility-administered medications for this visit.       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  He denies any injury or trauma to the elbow.  He is right-hand dominant.  He has not undergone any formal evaluation or treatment.    Review of Systems:    Constitutional: Negative for chills, fever and weight loss.   HENT: Negative for congestion.    Eyes: Negative for discharge and redness.   Respiratory: Negative for cough and shortness of breath.    Cardiovascular: Negative for chest pain.   Gastrointestinal: Negative for nausea and vomiting.   Musculoskeletal: See HPI.   Skin: Negative for rash.   Neurological: Negative for headaches.   Endo/Heme/Allergies: Does not bruise/bleed easily.   Psychiatric/Behavioral: The patient is not nervous/anxious.    All other systems reviewed and are negative.       Objective:      Physical Examination:    Vital Signs:    Vitals:    12/19/22 0902   BP: 130/66       Body mass index is 29.02 kg/m².    This a well-developed, well nourished patient in no acute distress.  They are alert and oriented and cooperative to examination.     Right elbow exam: Skin throughout the right elbow is clean dry and intact.  There is no erythema or ecchymosis.  There are no signs or symptoms of infection.  Patient is neurovascularly intact throughout the right upper extremity.  Can fully flex/extend at the right elbow and pronate/supinate the right forearm.  Can open and close his right hand into a fist.  He can oppose his right thumb to all digits in his right hand.   strength in his right hand would grade 5/5 and is equal bilaterally.  Does have pain at the distal biceps tendon with resisted supination of the right forearm.  He is tender to palpation about  the antecubital fossa down into the proximal forearm along the distal biceps tendon.  He localizes this as his area of maximal tenderness.  I do not feel a palpable defect.  He does not feel weak with resisted supination or flexion at the elbow.    Pertinent New Results:        XRAY Report / Interpretation:   Two views were taken of the right elbow today:  AP and lateral views.  They reveal no acute fractures or dislocations.  Visualized soft tissues appear unremarkable.      Assessment:       1. Biceps tendinitis on right      Plan:     Biceps tendinitis on right  -     Ambulatory referral/consult to Orthopedics  -     Cancel: X-Ray Elbow 2 Views Right  -     Ambulatory referral/consult to Physical/Occupational Therapy; Future; Expected date: 12/26/2022  -     methylPREDNISolone (MEDROL DOSEPACK) 4 mg tablet; use as directed  Dispense: 1 each; Refill: 0        Follow up in about 6 weeks (around 1/30/2023) for right elbow f/u, physical therapy .    I will place him on a Medrol Dosepak and formal physical therapy to try to help without this acute inflammatory reaction as quickly as possible.  I would like to see him back in 6 weeks to ensure he is doing well and this has resolve.  If not, consideration of advanced imaging with an MRI may be warranted to evaluate integrity of his distal right biceps tendon.  I did discuss with him avoiding aggravating factors to help reduce the incidence of possible biceps tendon rupture.        Seamus Dougherty, BRANDONS, PA-C    This note was created using turboBOTZ voice recognition software that occasionally misinterprets words or phrases.

## 2022-12-23 ENCOUNTER — DOCUMENTATION ONLY (OUTPATIENT)
Dept: REHABILITATION | Facility: HOSPITAL | Age: 47
End: 2022-12-23

## 2022-12-23 ENCOUNTER — TELEPHONE (OUTPATIENT)
Dept: ORTHOPEDICS | Facility: CLINIC | Age: 47
End: 2022-12-23

## 2022-12-23 DIAGNOSIS — M75.21 BICEPS TENDINITIS ON RIGHT: Primary | ICD-10-CM

## 2022-12-23 NOTE — PROGRESS NOTES
"  Pt arrived to his scheduled Occupational Therapy Evaluation appt this am and reported he was to see Physical Therapy for "Needlepoint". Further clarification revealed pt was to come for Dry Needling and PT was the  specified discipline.     Pt also wondering if he could possibly go for tx in [place], closer to his home in [facility] [place]. Pt opted to try to get an external referral for PT and will see if his insurance would cover tx at [facility]    Pt advised that PT does offer Dry Needling here at [facility] should he wish to return here for PT Eval and Treat in the future.         NISA Strong/MARK    "

## 2023-01-06 ENCOUNTER — CLINICAL SUPPORT (OUTPATIENT)
Dept: REHABILITATION | Facility: HOSPITAL | Age: 48
End: 2023-01-06
Payer: MEDICAID

## 2023-01-06 DIAGNOSIS — M75.21 BICEPS TENDINITIS ON RIGHT: ICD-10-CM

## 2023-01-06 PROCEDURE — 97110 THERAPEUTIC EXERCISES: CPT | Mod: PO

## 2023-01-06 PROCEDURE — 97161 PT EVAL LOW COMPLEX 20 MIN: CPT | Mod: PO

## 2023-01-06 NOTE — PATIENT INSTRUCTIONS
Access Code: [email or web address]  URL: [email or web address]  Date: 01/06/2023  Prepared by: Jim Orlando    Exercises  Seated Isometric Elbow Flexion - 1 x daily - 4-7 x weekly - 1-2 sets - 5-10 reps - 5-10 hold - 7/10 intensity  Standing Forearm Pronation and Supination with Hammer - 1 x daily - 4-7 x weekly - 1-2 sets - 5-10 reps - 5-10 hold - 7/10 intensity  Bicep Stretch at Table - 1 x daily - 4-7 x weekly - 1-2 sets - 5-10 reps - 5-10 hold - 7/10 intensity

## 2023-01-06 NOTE — PLAN OF CARE
[facility]   Physical Therapy Initial Evaluation     Date: 1/6/2023   Name: Augustine Boswell  Clinic Number: [id number]    Therapy Diagnosis:   Encounter Diagnosis   Name Primary?    Biceps tendinitis on right      Physician: Seamus Dougherty, *    Physician Orders: PT Eval and Treat  Medical Diagnosis from Referral: Biceps tendinitis, right  Evaluation Date: 1/6/2023  Authorization Period Expiration: 01/04/2023  Plan of Care Expiration: 04/06/2023  Progress Note Due: 02/06/2023  Visit # / Visits authorized: 1/1 0/20   FOTO: Completed 01/06/2023    Precautions: Standard    Time In: 0800  Time Out: 0900  Total Appointment Time (timed & untimed codes): 55 minutes      SUBJECTIVE   Date of onset: >1 month    History of current condition - Betito reports:   Insidious onset of right elbow pain described as burning, sharp, grabbing, tight to the front (points to antecubital fossa) and lateral elbow, and forearm (points to wrist extensors). Right elbow pain with gripping, throwing, lifting, turning arm over. Sometimes his elbow feels unstable in full extension.    Patient reports he had some ultrasound and stretching exercises with minimal relief. He is interested in dry needling.    Patient has no neck or shoulder complaints. No numbness or tingling.  feels weak, but thinks it is because it is painful.    Patient has been managing with Naproxen, Aleve maybe 4x/week.     He was previously going to the gym and doing general weightlifting. Currently avoiding pull-ups, chin-ups, preacher curls, OH press.    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 He has to plan his jobs a little more carefully to avoid painful positions, but often unavoidable.    Prior Therapy: Brief period of OT for left elbow  Social History: Lives with spouse   Occupation: AC repair  Prior Level of Function: No significant functional limitations  Current Level of  Function: Limited gripping, lifting, carrying, twisting with right upper extremity.    Pain:  Current 1/10, worst 8/10, best 1/10   Location: right elbow   Description: Aching, Dull, Grabbing, and Sharp  Aggravating Factors: Gripping, lifting, twisting, carrying  Easing Factors: pain medication and rest    Red Flag Screening:   Cough  Sneeze  Strain: (--)  Bladder/ bowel: (--)  Falls: (--)  Night pain: (--)  Unexplained weight loss: (--)  General health: Good    Patients goals: Resume normal exercise, reduce pain, improve ability to do his work     Imaging: See EPIC    Medical History:   Past Medical History:   Diagnosis Date    Allergy     Asthma     GERD (gastroesophageal reflux disease)     Hepatitis C virus infection cured after antiviral drug therapy     s/p epclusa, treated / cured - svr24 2021       Surgical History:   Augustine Boswell  has a past surgical history that includes Fracture surgery.    Medications:   Augustine has a current medication list which includes the following prescription(s): albuterol, ibuprofen, lisinopril, methylprednisolone, multivit-mins no.63/iron/folic, naproxen, and omeprazole.    Allergies:   Review of patient's allergies indicates:  No Known Allergies       OBJECTIVE     Grossly symmetrical elbow range of motion, pain free  Limited pronation with reprduction of medial elbow symptoms    Reproduction of pain in antecubital fossa with resisted elbow flexion with pronation>neutral. No pain with supinated .    Horizontal scar over antecubital fossa lateral to biceps insertion.     Elbow flexion strength testin/5 at 90 deg  Strong and painful at 90 deg pronated and neutral  5/5 at 30 deg flexion in supination  Strong and painful at 30 deg flexion in pronation and neutral    Functional Screen:     SFMA FN: functional, nonpainful. FP: functional, painful. DP: dysfunctional, painful. DN: dysfunctional, nonpainful.   AROM cervical flexion Dysfunctional, non-painful   AROM cervical  extension Dysfunctional, non-painful   AROM cervical rotation R: Dysfunctional, non-painful  L: Dysfunctional, non-painful   AROM Shoulder ER R: Dysfunctional, non-painful T4 limited thoracic extension  L: Dysfunctional, non-painful T4 limited thoracic extension   AROM Shoulder IR R: Dysfunctional, non-painful T5  L: Dysfunctional, non-painful T5   multi-segmental flexion  Dysfunctional, non-painful   multi-segmental extension Dysfunctional, non-painful   multi-segmental rotation  R: Dysfunctional, non-painful  L: Dysfunctional, non-painful   SLS R: Dysfunctional, non-painful  L: Dysfunctional, non-painful   Arms Down Deep Squat Dysfunctional, non-painful     Myotome Peripheral Right Left   C4 (Shoulder Elevation) Dorsal Scapular negative negative     C5 (Shoulder ABduction) Axillary negative   negative     C5 (Shoulder ER) Suprascapular  negative   negative     C6 (Shoulder IR) Subscapularis  negative   negative     C6 (Wrist extension) Radial positive - fatigable painful   negative     C7 (Wrist flexion) Ulnar negative   negative     C7 (Elbow extension) Radial negative   negative     C8 (Thumb ABduction) Radial negative   negative     C8 (Opposition) Median negative   negative     T1 (1st/2nd MTP ADduction Median negative   negative     T1 (4th/5th MTP ADduction, Ulnar nerve) Ulnar negative   negative        Seated Thoracic Range of Motion:    % Pain   Right Rotation 75 (-)   Left Rotation 75 (-)     Upper Extremity Range of Motion (deg)  (R) UE AROM/PROM (L) UE AROM/PROM   Shoulder flexion: 165/170 Shoulder flexion: 165/170   Shoulder extension: 40/45 Shoulder extension: 40/45   Shoulder Abduction: 160/165 Shoulder abduction: 160/165   Shoulder ER T4/90 Shoulder ER T4/90   Shoulder IR T5/40 Shoulder IR T5/40   Elbow flexion/extension WNL Elbow flexion WNL     Upper Extremity Strength  (R) UE  (L) UE    Shoulder flexion: 4+/5 Shoulder flexion: 5/5   Shoulder extension: 4/5 Shoulder extension: 4/5   Shoulder  Abduction: 5/5 Shoulder abduction: 5/5   Shoulder ER 5/5 Shoulder ER 5/5   Shoulder IR 5/5 Shoulder IR 5/5   Elbow flexion: 5/5 Elbow flexion: 5/5   Elbow extension: 5/5 Elbow extension: 5/5   Scapular retraction: 4+/5 Scapular retraction: 4+/5   Scapular protraction: 4+/5 Scapular Protraction: 4+/5     Special Tests (cervical):  Compression (-)   Distraction (-)   Spurlings (-)   Lateral Flexion Alar Ligament (-)   DNF test <15 sec     Special Tests (shoulder):   Right   Bicep Load I/II (-)   Speed's test (+) at end range extension and supination   Apprehension tests (-)     Elbow ligament testing: (-) varus/valgus    Joint Mobility: *  Cervical: HYPOmobile  Wrist: HYPOmobile, limited extension 80 deg right, 85 deg left    Palpation:  wrist extensors, supinator, antecubital fossa, brachialis, brachioradialis  Non-tender to distal biceps tendon  Sensation: intact to light touch    Limitation/Restriction for FOTO Elbow Survey    Therapist reviewed FOTO scores for Augustine Boswell on 1/6/2023.   FOTO documents entered into [employer] - see Media section.    Limitation Score: 48%         TREATMENT     Total Treatment time (time-based codes) separate from Evaluation: 40 minutes     Betito received the treatments listed below:      Betito received therapeutic exercises to develop strength, endurance, and ROM for 15 minutes including:  Isometric elbow flexion at ~30 deg elbow flexion 10x10 sec  - neutral  - supinated  - pronated  Forearm supination and pronation with 3# dowel x20  Wrist extension stretch 2x45 sec  Biceps stretch 2x30 sec    Betito received the following manual therapy techniques: Joint mobilizations, Manual traction, Myofacial release, and Soft tissue Mobilization were applied to the: right elbow, wrist for 25 minutes, including:  Soft tissue mobilization, cross friction massage wrist extensors, supinator, biceps brachii, brachioradialis, brachialis  Lateral forearm mobilization with patient gripping    Functional Dry  Needling:  Discussed the purpose, mechanism, and indications for functional dry needling with Betito . Patient was cleared of all precautions and contraindications and patient signed written consent and gave verbal consent to dry needling Rx today.     Palpation, myotome testing, and physical exam used to determine dry needling treatment sites.     Patient received dry needling to the following areas:  Biceps brachii, brachioradialis, brachialis, supinator, wrist extensors with e-stim    Patient tolerated the treatment well and demonstrated decreased pain with resisted elbow flexion in neutral and pronation following treatment     Patient demonstrated significant decrease in symptoms with resisted elbow flexion in supination and neutral. Slight decrease with resisted flexion with pronated .    PATIENT EDUCATION AND HOME EXERCISES     Education provided:   - Presentation, prognosis, plan of care, HEP     Written Home Exercises Provided: yes. Exercises were reviewed and Betito was able to demonstrate them prior to the end of the session.  Betito demonstrated good  understanding of the education provided. See EMR under Patient Instructions for exercises provided during therapy sessions.    Images copyright of [email or web address]    ASSESSMENT     Augustine is a 47 y.o. male referred to outpatient Physical Therapy with a medical diagnosis of biceps tendinitis. Patient presents with decreased strength, decreased endurance, decreased joint mobility, decreased muscle length. These impairments are limiting his gripping, flexing, lifting, carrying, twisting with his right upper extremity. As a result he has increased difficulty performing his normal home, occupational, and recreational activities. Patient demonstrated good response to manual therapy suggesting he is a good candidate for skilled PT.    Patient prognosis is Excellent.     Patient will benefit from skilled outpatient Physical Therapy to address the  deficits stated above and in the chart below, provide patient /family education, and to maximize patient's level of independence.     Plan of care discussed with patient: Yes  Patient's spiritual, cultural and educational needs considered and patient is agreeable to the plan of care and goals as stated below:     Anticipated Barriers for therapy: None    Medical Necessity is demonstrated by the following  History  Co-morbidities and personal factors that may impact the plan of care Co-morbidities:   History of surgery to right elbow due to infection    Personal Factors:   no deficits     low   Examination  Body Structures and Functions, activity limitations and participation restrictions that may impact the plan of care Body Regions:   upper extremities    Body Systems:    ROM  strength  scar formation    Participation Restrictions:   None    Activity limitations:   Learning and applying knowledge  no deficits    General Tasks and Commands  no deficits    Communication  no deficits    Mobility  fine hand use (grasping/picking up)    Self care  no deficits    Domestic Life  doing house work (cleaning house, washing dishes, laundry)    Interactions/Relationships  no deficits    Life Areas  employment - increased time required    Community and Social Life  community life  recreation and leisure         low   Clinical Presentation stable and uncomplicated low   Decision Making/ Complexity Score: low     Goals:  Short Term Goals: 4 weeks   Patient will be independent with HEP for symptom management  Patient will report >25% decrease in symptoms with normal home activity    Long Term Goals: 8 weeks   Patient will be independent with progressive HEP for continued strengthening to support return to previous level of function  Patient will have grossly symmetrical, pain-free range of motion for improved functional mobility  Patient will increase strength of RUE by at least 1 grade via MMT testing to allow for improved  performance of ADLs and daily functional tasks  Patient will achieve <30% limitation as measured by the FOTO to demonstrate decreased functional disability  Patient will demonstrate grossly symmetrical  strength    PLAN   Plan of care Certification: 1/6/2023 to 03/06/2023.    Outpatient Physical Therapy 1 times weekly for 8 weeks to include the following interventions: Electrical Stimulation Dry needling, Manual Therapy, Moist Heat/ Ice, Neuromuscular Re-ed, Patient Education, Self Care, Therapeutic Activities, and Therapeutic Exercise.     Jim Orlando, PT      I CERTIFY THE NEED FOR THESE SERVICES FURNISHED UNDER THIS PLAN OF TREATMENT AND WHILE UNDER MY CARE   Physician's comments:     Physician's Signature: ___________________________________________________

## 2023-01-16 ENCOUNTER — PATIENT MESSAGE (OUTPATIENT)
Dept: FAMILY MEDICINE | Facility: CLINIC | Age: 48
End: 2023-01-16

## 2023-01-27 LAB
ALBUMIN SERPL-MCNC: 4.6 G/DL (ref 4–5)
ALBUMIN/GLOB SERPL: 1.8 {RATIO} (ref 1.2–2.2)
ALP SERPL-CCNC: 71 IU/L (ref 44–121)
ALT SERPL-CCNC: 21 IU/L (ref 0–44)
AST SERPL-CCNC: 18 IU/L (ref 0–40)
BILIRUB SERPL-MCNC: 0.3 MG/DL (ref 0–1.2)
BUN SERPL-MCNC: 19 MG/DL (ref 6–24)
BUN/CREAT SERPL: 16 (ref 9–20)
CALCIUM SERPL-MCNC: 9.7 MG/DL (ref 8.7–10.2)
CHLORIDE SERPL-SCNC: 106 MMOL/L (ref 96–106)
CHOLEST SERPL-MCNC: 162 MG/DL (ref 100–199)
CO2 SERPL-SCNC: 25 MMOL/L (ref 20–29)
CREAT SERPL-MCNC: 1.22 MG/DL (ref 0.76–1.27)
EST. GFR  (NO RACE VARIABLE): 74 ML/MIN/1.73
GLOBULIN SER CALC-MCNC: 2.5 G/DL (ref 1.5–4.5)
GLUCOSE SERPL-MCNC: 93 MG/DL (ref 70–99)
HDLC SERPL-MCNC: 51 MG/DL
LDLC SERPL CALC-MCNC: 98 MG/DL (ref 0–99)
POTASSIUM SERPL-SCNC: 4.9 MMOL/L (ref 3.5–5.2)
PROT SERPL-MCNC: 7.1 G/DL (ref 6–8.5)
SODIUM SERPL-SCNC: 145 MMOL/L (ref 134–144)
TRIGL SERPL-MCNC: 67 MG/DL (ref 0–149)
VLDLC SERPL CALC-MCNC: 13 MG/DL (ref 5–40)

## 2023-01-30 ENCOUNTER — CLINICAL SUPPORT (OUTPATIENT)
Dept: REHABILITATION | Facility: HOSPITAL | Age: 48
End: 2023-01-30
Payer: MEDICAID

## 2023-01-30 DIAGNOSIS — M75.21 BICEPS TENDINITIS ON RIGHT: ICD-10-CM

## 2023-01-30 DIAGNOSIS — S56.911S: ICD-10-CM

## 2023-01-30 PROCEDURE — 97110 THERAPEUTIC EXERCISES: CPT | Mod: PO

## 2023-01-30 NOTE — PROGRESS NOTES
[facility]   Physical Therapy Treatment Note     Name: Augustine Boswell  [facility] Number: [id number]    Therapy Diagnosis:   Encounter Diagnoses   Name Primary?    Biceps tendinitis on right     Forearm strain, right, sequela      Physician: Seamus Dougherty, *    Visit Date: 1/30/2023    Physician Orders: PT Eval and Treat  Medical Diagnosis from Referral: Biceps tendinitis, right  Evaluation Date: 1/6/2023  Authorization Period Expiration: 01/04/2024  Plan of Care Expiration: 04/06/2023  Progress Note Due: 02/06/2023  Visit # / Visits authorized: 1/1 1/20   FOTO: Completed 01/06/2023     Precautions: Standard    Time In: 0905  Time Out: 1000  Total Billable Time: 55 minutes      SUBJECTIVE     Pt reports: Feels it has gotten progressively better since his Initial Evaluation. He is not fearful of it snapping and has been able to return to most of his exercises. He does get pain in the elbow with quick impacts like volleyball and when he is more fatigued (repetitive burpees). He is not feeling as limited during his daily work activities.     Able to curls up to 30#  Maybe 60# on machine preacher curls    History of current condition - 01/06/2023  Insidious onset of right elbow pain described as burning, sharp, grabbing, tight to the front (points to antecubital fossa) and lateral elbow, and forearm (points to wrist extensors). Right elbow pain with gripping, throwing, lifting, turning arm over. Sometimes his elbow feels unstable in full extension.  Patient reports he had some ultrasound and stretching exercises with minimal relief. He is interested in dry needling.  Patient has no neck or shoulder complaints. No numbness or tingling.  feels weak, but thinks it is because it is painful.  Patient has been managing with Naproxen, Aleve maybe 4x/week.   He was previously going to the gym and doing general weightlifting. Currently avoiding pull-ups, chin-ups, preacher curls, [place]  press.  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 He has to plan his jobs a little more carefully to avoid painful positions, but often unavoidable.    He was compliant with home exercise program.  Response to previous treatment: Significant decreased in symptoms  Functional change: Improved ability to perform all work activities, decreased anxiety regarding symptoms, return to weightlifting    Pain: 2/10  Location: right elbow      OBJECTIVE        (LBS)  Left:  100, 100, 90 (bent)  100, 103, 105 (straight)    Right:  100, 100, 105 (bent)  120, 95, 105 (straight)    Grossly symmetrical elbow range of motion, pain free  Limited pronation with reprduction of medial elbow symptoms     Reproduction of pain in antecubital fossa with resisted elbow flexion with pronation No pain with supinated or neutral .     Elbow flexion strength testin/5 at 90 deg  Strong and painful at 90 deg pronated  5/5 at 30 deg flexion in supination  Strong and painful at 30 deg flexion in pronation      Functional Screen:      SFMA FN: functional, nonpainful. FP: functional, painful. DP: dysfunctional, painful. DN: dysfunctional, nonpainful.   AROM cervical flexion Dysfunctional, non-painful   AROM cervical extension Dysfunctional, non-painful   AROM cervical rotation R: Dysfunctional, non-painful  L: Dysfunctional, non-painful   AROM Shoulder ER R: Dysfunctional, non-painful T4 limited thoracic extension  L: Dysfunctional, non-painful T4 limited thoracic extension   AROM Shoulder IR R: Dysfunctional, non-painful T5  L: Dysfunctional, non-painful T5   multi-segmental flexion  Dysfunctional, non-painful   multi-segmental extension Dysfunctional, non-painful   multi-segmental rotation  R: Dysfunctional, non-painful  L: Dysfunctional, non-painful   SLS R: Dysfunctional, non-painful  L: Dysfunctional, non-painful   Arms Down Deep Squat Dysfunctional, non-painful      Myotome  Peripheral Right Left   C4 (Shoulder Elevation) Dorsal Scapular negative negative      C5 (Shoulder ABduction) Axillary negative    negative      C5 (Shoulder ER) Suprascapular  negative    negative      C6 (Shoulder IR) Subscapularis  negative    negative      C6 (Wrist extension) Radial positive - fatigable    negative      C7 (Wrist flexion) Ulnar negative    negative      C7 (Elbow extension) Radial negative    negative      C8 (Thumb ABduction) Radial negative    negative      C8 (Opposition) Median negative    negative      T1 (1st/2nd MTP ADduction Median negative    negative      T1 (4th/5th MTP ADduction, Ulnar nerve) Ulnar negative    negative         Seated Thoracic Range of Motion:     % Pain   Right Rotation 75 (-)   Left Rotation 75 (-)      Upper Extremity Range of Motion (deg)  (R) UE AROM/PROM (L) UE AROM/PROM   Shoulder flexion: 165/170 Shoulder flexion: 165/170   Shoulder extension: 40/45 Shoulder extension: 40/45   Shoulder Abduction: 160/165 Shoulder abduction: 160/165   Shoulder ER T4/90 Shoulder ER T4/90   Shoulder IR T5/40 Shoulder IR T5/40   Elbow flexion/extension WNL Elbow flexion WNL      Upper Extremity Strength  (R) UE   (L) UE     Shoulder flexion: 4+/5 Shoulder flexion: 5/5   Shoulder extension: 4/5 Shoulder extension: 4/5   Shoulder Abduction: 5/5 Shoulder abduction: 5/5   Shoulder ER 5/5 Shoulder ER 5/5   Shoulder IR 5/5 Shoulder IR 5/5   Elbow flexion: 5/5 Elbow flexion: 5/5   Elbow extension: 5/5 Elbow extension: 5/5   Scapular retraction: 4+/5 Scapular retraction: 4+/5   Scapular protraction: 4+/5 Scapular Protraction: 4+/5      Special Tests (cervical):  Compression (-)   Distraction (-)   Spurlings (-)   Lateral Flexion Alar Ligament (-)   DNF test <15 sec      Special Tests (shoulder):    Right   Bicep Load I/II (-)   Speed's test (+) at end range extension and supination   Apprehension tests (-)      Elbow ligament testing: (-) varus/valgus     Joint Mobility: *  Cervical:  HYPOmobile  Wrist: HYPOmobile, limited extension 80 deg right, 85 deg left     Palpation:  wrist extensors, supinator, antecubital fossa, brachialis, brachioradialis  Non-tender to distal biceps tendon  Sensation: intact to light touch     Limitation/Restriction for FOTO Elbow Survey     Therapist reviewed FOTO scores for Augustine Boswell on 1/6/2023 and 01/30/2023.   FOTO documents entered into [facility] - see Media section.     Limitation Score: 48% ---> 42%           TREATMENT     Total Treatment time (time-based codes) separate from Evaluation: 55 minutes     Betito received the treatments listed below:      Betito received therapeutic exercises to develop strength, endurance, and ROM for 40 minutes including:  Isometric elbow flexion at ~30 deg elbow flexion 10x10 sec  - neutral  - supinated  - pronated  Biceps curls 3x8 each  - pronated 8#  - supinated 25#  - neutral 25#    Forearm supination and pronation with 3# dowel 3x1min each dir    Suitcase carry 40#, 50# 2x50 feet each arm  Arm bar 15# bell up KB 2x1 min bilaterally     Wrist extension stretch 2x45 sec  Biceps stretch 2x30 sec     Betito received the following manual therapy techniques: Joint mobilizations, Manual traction, Myofacial release, and Soft tissue Mobilization were applied to the: right elbow, wrist for 15 minutes, including:  Soft tissue mobilization, cross friction massage wrist extensors, supinator, biceps brachii, brachioradialis, brachialis  Lateral forearm mobilization with patient gripping       PATIENT EDUCATION AND HOME EXERCISES     Home Exercises Provided and Patient Education Provided     Education provided:   - Presentation, prognosis, plan of care, HEP     Written Home Exercises Provided: yes. Exercises were reviewed and Betito was able to demonstrate them prior to the end of the session.  Betito demonstrated good  understanding of the education provided. See EMR under Patient Instructions for exercises provided during therapy sessions    ASSESSMENT      Betito demonstrates significant improvement in , elbow flexion, supination/pronation and has decreased symptoms with his occupational tasks. He is progressing with his weightlifting and feeling more confident in the eventual recovery of his previous strength. He would benefit from brief period of PT to ensure continued progress and maximize independence with progressive strengthening program.    Betito Is progressing well towards his goals.   Pt prognosis is Good.     Pt will continue to benefit from skilled outpatient physical therapy to address the deficits listed in the problem list box on initial evaluation, provide pt/family education and to maximize pt's level of independence in the home and community environment.     Pt's spiritual, cultural and educational needs considered and pt agreeable to plan of care and goals.     Anticipated barriers to physical therapy: None    Goals: Short Term Goals: 4 weeks   Patient will be independent with HEP for symptom management MET 01/30/2023  Patient will report >25% decrease in symptoms with normal home activity MET 01/30/2023     Long Term Goals: 8 weeks   Patient will be independent with progressive HEP for continued strengthening to support return to previous level of function  Patient will have grossly symmetrical, pain-free range of motion for improved functional mobility  Patient will increase strength of RUE by at least 1 grade via MMT testing to allow for improved performance of ADLs and daily functional tasks PROGRESSING  Patient will achieve <30% limitation as measured by the FOTO to demonstrate decreased functional disability  Patient will demonstrate grossly symmetrical  strength MET 01/30/2023    PLAN     LOAD! Open-chain resisted therapeutic exercise     Jim Orlando, PT

## 2023-02-16 ENCOUNTER — PATIENT MESSAGE (OUTPATIENT)
Dept: REHABILITATION | Facility: HOSPITAL | Age: 48
End: 2023-02-16

## 2023-02-16 ENCOUNTER — PATIENT MESSAGE (OUTPATIENT)
Dept: FAMILY MEDICINE | Facility: CLINIC | Age: 48
End: 2023-02-16

## 2023-02-16 ENCOUNTER — TELEPHONE (OUTPATIENT)
Dept: FAMILY MEDICINE | Facility: CLINIC | Age: 48
End: 2023-02-16

## 2023-02-17 ENCOUNTER — OFFICE VISIT (OUTPATIENT)
Dept: FAMILY MEDICINE | Facility: CLINIC | Age: 48
End: 2023-02-17
Payer: MEDICAID

## 2023-02-17 VITALS
OXYGEN SATURATION: 97 % | HEIGHT: 74 IN | HEART RATE: 73 BPM | BODY MASS INDEX: 30.22 KG/M2 | WEIGHT: 235.5 LBS | SYSTOLIC BLOOD PRESSURE: 130 MMHG | DIASTOLIC BLOOD PRESSURE: 86 MMHG | TEMPERATURE: 98 F

## 2023-02-17 DIAGNOSIS — I10 ESSENTIAL HYPERTENSION: Primary | ICD-10-CM

## 2023-02-17 DIAGNOSIS — M75.21 BICEPS TENDINITIS ON RIGHT: ICD-10-CM

## 2023-02-17 DIAGNOSIS — Z12.11 SCREENING FOR COLON CANCER: ICD-10-CM

## 2023-02-17 PROCEDURE — 99214 OFFICE O/P EST MOD 30 MIN: CPT | Mod: S$PBB,,, | Performed by: NURSE PRACTITIONER

## 2023-02-17 PROCEDURE — 3079F PR MOST RECENT DIASTOLIC BLOOD PRESSURE 80-89 MM HG: ICD-10-PCS | Mod: CPTII,,, | Performed by: NURSE PRACTITIONER

## 2023-02-17 PROCEDURE — 1159F PR MEDICATION LIST DOCUMENTED IN MEDICAL RECORD: ICD-10-PCS | Mod: CPTII,,, | Performed by: NURSE PRACTITIONER

## 2023-02-17 PROCEDURE — 1159F MED LIST DOCD IN RCRD: CPT | Mod: CPTII,,, | Performed by: NURSE PRACTITIONER

## 2023-02-17 PROCEDURE — 3075F PR MOST RECENT SYSTOLIC BLOOD PRESS GE 130-139MM HG: ICD-10-PCS | Mod: CPTII,,, | Performed by: NURSE PRACTITIONER

## 2023-02-17 PROCEDURE — 1160F RVW MEDS BY RX/DR IN RCRD: CPT | Mod: CPTII,,, | Performed by: NURSE PRACTITIONER

## 2023-02-17 PROCEDURE — 3008F PR BODY MASS INDEX (BMI) DOCUMENTED: ICD-10-PCS | Mod: CPTII,,, | Performed by: NURSE PRACTITIONER

## 2023-02-17 PROCEDURE — 3008F BODY MASS INDEX DOCD: CPT | Mod: CPTII,,, | Performed by: NURSE PRACTITIONER

## 2023-02-17 PROCEDURE — 3079F DIAST BP 80-89 MM HG: CPT | Mod: CPTII,,, | Performed by: NURSE PRACTITIONER

## 2023-02-17 PROCEDURE — 3075F SYST BP GE 130 - 139MM HG: CPT | Mod: CPTII,,, | Performed by: NURSE PRACTITIONER

## 2023-02-17 PROCEDURE — 99215 OFFICE O/P EST HI 40 MIN: CPT | Performed by: NURSE PRACTITIONER

## 2023-02-17 PROCEDURE — 1160F PR REVIEW ALL MEDS BY PRESCRIBER/CLIN PHARMACIST DOCUMENTED: ICD-10-PCS | Mod: CPTII,,, | Performed by: NURSE PRACTITIONER

## 2023-02-17 PROCEDURE — 99214 PR OFFICE/OUTPT VISIT, EST, LEVL IV, 30-39 MIN: ICD-10-PCS | Mod: S$PBB,,, | Performed by: NURSE PRACTITIONER

## 2023-02-17 NOTE — PROGRESS NOTES
SUBJECTIVE:      Patient ID: Augustine Boswell is a 47 y.o. male.    Chief Complaint: Hypertension and Follow-up    47-year-old male presents to the clinic for hypertension follow-up.      Labs completed in January were stable.  Cholesterol is well controlled.  Liver and kidney function were within normal limits.  Blood pressure is stable today.  Currently taking lisinopril 40 mg.  Reports compliance.  He continues to smoke.    He was evaluated by Ortho for right bicep tendonitis.  Prescribed Medrol Dosepak and started PT. Physical therapy has been going well.    Overdue healthcare maintenance reviewed with patient.  Patient is due for a colonoscopy.  No history of colon cancer in the family.  Denies abdominal pain, bloody stools, or irregular bowel pattern.  Patient declined further COVID-19 immunizations.  Patient also declined influenza vaccine.    Hypertension  This is a chronic problem. The current episode started more than 1 year ago. The problem is controlled. Pertinent negatives include no anxiety, blurred vision, chest pain, headaches, malaise/fatigue, neck pain, orthopnea, palpitations, peripheral edema, PND, shortness of breath or sweats. There are no associated agents to hypertension. Risk factors for coronary artery disease include male gender and smoking/tobacco exposure. Past treatments include ACE inhibitors. The current treatment provides significant improvement. Compliance problems include diet and exercise.  There is no history of a thyroid problem.     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 Rescue 18 g 1    ibuprofen (ADVIL,MOTRIN) 200 MG tablet Take 200 mg by mouth every 6 (six) hours as needed for Pain.      lisinopriL (PRINIVIL,ZESTRIL) 40 MG tablet Take 1 tablet (40 mg total) by mouth once daily. 90 tablet 1    multivit-mins no.63/iron/folic (M-VIT ORAL) Take by mouth once daily.      omeprazole (PRILOSEC) 20 MG capsule Take 20 mg by mouth once daily. PRN GERD      methylPREDNISolone (MEDROL DOSEPACK) 4 mg tablet use as directed (Patient not taking: Reported on 2/17/2023) 1 each 0    naproxen (NAPROSYN) 500 MG tablet Take 1 tablet (500 mg total) by mouth 2 (two) times daily as needed (headaches). (Patient not taking: Reported on 2/17/2023) 30 tablet 0     No current facility-administered medications for this visit.     Review of patient's allergies indicates:  No Known Allergies   Past Medical History:   Diagnosis Date    Allergy     Asthma     GERD (gastroesophageal reflux disease)     Hepatitis C virus infection cured after antiviral drug therapy     s/p epclusa, treated / cured - [id number] 7/2021     Past Surgical History:   Procedure Laterality Date    FRACTURE SURGERY      right hand       Review of Systems   Constitutional:  Negative for activity change, appetite change, chills, diaphoresis, fatigue, fever, malaise/fatigue and unexpected weight change.   HENT:  Negative for congestion, ear pain, postnasal drip, rhinorrhea, sinus pressure, sore throat, trouble swallowing and voice change.    Eyes:  Negative for blurred vision, pain, discharge and visual disturbance.   Respiratory:  Negative for cough, chest tightness, shortness of breath and wheezing.    Cardiovascular:  Negative for chest pain, palpitations, orthopnea and  "PND.   Gastrointestinal:  Negative for abdominal pain, constipation, diarrhea, nausea and vomiting.   Genitourinary:  Negative for difficulty urinating, flank pain, frequency and urgency.   Musculoskeletal:  Positive for arthralgias. Negative for back pain, joint swelling and neck pain.        Right elbow pain   Skin:  Negative for color change and rash.   Neurological:  Negative for dizziness, seizures, syncope, weakness, numbness and headaches.   Hematological:  Negative for adenopathy.   Psychiatric/Behavioral:  Negative for dysphoric mood and sleep disturbance. The patient is not nervous/anxious.     OBJECTIVE:      Vitals:    02/17/23 0901   BP: 130/86   BP Location: Left arm   Patient Position: Sitting   BP Method: Medium (Automatic)   Pulse: 73   Temp: 98.2 °F (36.8 °C)   TempSrc: Oral   SpO2: 97%   Weight: 106.8 kg (235 lb 8 oz)   Height: 6' 2" (1.88 m)     Physical Exam  Vitals and nursing note reviewed.   Constitutional:       General: He is awake. He is not in acute distress.     Appearance: Normal appearance. He is obese. He is not ill-appearing, toxic-appearing or diaphoretic.   HENT:      Head: Normocephalic and atraumatic.      Right Ear: Tympanic membrane, ear canal and external ear normal.      Left Ear: Tympanic membrane, ear canal and external ear normal.      Nose: Nose normal.   Eyes:      General: Lids are normal. Gaze aligned appropriately.      Conjunctiva/sclera: Conjunctivae normal.      Right eye: Right conjunctiva is not injected.      Left eye: Left conjunctiva is not injected.      Pupils: Pupils are equal, round, and reactive to light.   Cardiovascular:      Rate and Rhythm: Normal rate and regular rhythm.      Pulses: Normal pulses.      Heart sounds: Normal heart sounds, S1 normal and S2 normal. No murmur heard.    No friction rub. No gallop.   Pulmonary:      Effort: Pulmonary effort is normal. No respiratory distress.      Breath sounds: Normal breath sounds. No stridor. No " decreased breath sounds, wheezing, rhonchi or rales.   Chest:      Chest wall: No tenderness.   Musculoskeletal:      Cervical back: Neck supple.      Right lower leg: No edema.      Left lower leg: No edema.   Lymphadenopathy:      Cervical: No cervical adenopathy.   Skin:     General: Skin is warm and dry.      Capillary Refill: Capillary refill takes less than 2 seconds.      Findings: No erythema or rash.   Neurological:      Mental Status: He is alert and oriented to person, place, and time. Mental status is at baseline.   Psychiatric:         Attention and Perception: Attention normal.         Mood and Affect: Mood normal.         Speech: Speech normal.         Behavior: Behavior normal. Behavior is cooperative.         Thought Content: Thought content normal.         Judgment: Judgment normal.      No visits with results within 1 Day(s) from this visit.   Latest known visit with results is:   Office Visit on 12/13/2022   Component Date Value Ref Range Status    Glucose 01/26/2023 93  70 - 99 mg/dL Final    BUN 01/26/2023 19  6 - 24 mg/dL Final    Creatinine 01/26/2023 1.22  0.76 - 1.27 mg/dL Final    eGFR 01/26/2023 74  >59 mL/min/1.73 Final    BUN/Creatinine Ratio 01/26/2023 16  9 - 20 Final    Sodium 01/26/2023 145 (H)  134 - 144 mmol/L Final    Potassium 01/26/2023 4.9  3.5 - 5.2 mmol/L Final    Chloride 01/26/2023 106  96 - 106 mmol/L Final    CO2 01/26/2023 25  20 - 29 mmol/L Final    Calcium 01/26/2023 9.7  8.7 - 10.2 mg/dL Final    Protein, Total 01/26/2023 7.1  6.0 - 8.5 g/dL Final    Albumin 01/26/2023 4.6  4.0 - 5.0 g/dL Final    Globulin, Total 01/26/2023 2.5  1.5 - 4.5 g/dL Final    Albumin/Globulin Ratio 01/26/2023 1.8  1.2 - 2.2 Final    Total Bilirubin 01/26/2023 0.3  0.0 - 1.2 mg/dL Final    Alkaline Phosphatase 01/26/2023 71  44 - 121 IU/L Final    AST 01/26/2023 18  0 - 40 IU/L Final    ALT 01/26/2023 21  0 - 44 IU/L Final    Cholesterol 01/26/2023 162  100 - 199 mg/dL Final    Triglycerides  01/26/2023 67  0 - 149 mg/dL Final    HDL 01/26/2023 51  >39 mg/dL Final    VLDL Cholesterol [place] 01/26/2023 13  5 - 40 mg/dL Final    LDL Calculated 01/26/2023 98  0 - 99 mg/dL Final     Assessment:       1. Essential hypertension    2. Biceps tendinitis on right    3. Screening for colon cancer        Plan:       Essential hypertension  Reduce the amount of salt in your diet; Lose weight; Avoid drinking too much alcohol; Exercise at least 30 minutes per day most days of the week.  Continue current medications and home BP monitoring.    Biceps tendinitis on right  Continue PT.     Screening for colon cancer  Discussed various colon cancer screening methods.  After further discussion patient would like to proceed with a colonoscopy.  Refer to GI has been placed.  -     Ambulatory referral/consult to Gastroenterology; Future; Expected date: 02/24/2023    This note was created using [employer] voice recognition software that occasionally misinterprets phrases or words.     Follow up in about 6 months (around 8/17/2023) for HTN.      2/17/2023 GERRY Katz, AURELIAP

## 2023-02-20 ENCOUNTER — CLINICAL SUPPORT (OUTPATIENT)
Dept: REHABILITATION | Facility: HOSPITAL | Age: 48
End: 2023-02-20
Payer: MEDICAID

## 2023-02-20 DIAGNOSIS — S56.911S: ICD-10-CM

## 2023-02-20 DIAGNOSIS — M75.21 BICEPS TENDINITIS ON RIGHT: Primary | ICD-10-CM

## 2023-02-20 PROCEDURE — 97110 THERAPEUTIC EXERCISES: CPT | Mod: PO

## 2023-02-20 PROCEDURE — 97140 MANUAL THERAPY 1/> REGIONS: CPT | Mod: PO

## 2023-02-20 NOTE — PROGRESS NOTES
"  [facility]   Physical Therapy Progress Note     Name: Augustine Boswell  Clinic Number: [id number]    Therapy Diagnosis:   Encounter Diagnoses   Name Primary?    Biceps tendinitis on right Yes    Forearm strain, right, sequela      Physician: Seamus Dougherty, *    Visit Date: 2/20/2023    Physician Orders: PT Eval and Treat  Medical Diagnosis from Referral: Biceps tendinitis, right  Evaluation Date: 1/6/2023  Authorization Period Expiration: 01/04/2024  Plan of Care Expiration: 04/06/2023  Progress Note Due: 02/06/2023  Visit # / Visits authorized: 1/1 3/20   FOTO: Completed 01/06/2023     Precautions: Standard    Time In: 0801  Time Out: 0900  Total Billable Time: 55 minutes      SUBJECTIVE     Pt reports: He has been performing his HEP as prescribed and can do most things without issue. He still gets "biting" pain to the inside of the elbow and forearm with gripping unless he is really deliberate about it. He is able to manage heavy AC equipment, but the awkward  can get very fatiguing. He has not increased the resistance with any of the forearm training - he feels weakest with pronated challenges.    History of current condition - 01/06/2023  Insidious onset of right elbow pain described as burning, sharp, grabbing, tight to the front (points to antecubital fossa) and lateral elbow, and forearm (points to wrist extensors). Right elbow pain with gripping, throwing, lifting, turning arm over. Sometimes his elbow feels unstable in full extension.  Patient reports he had some ultrasound and stretching exercises with minimal relief. He is interested in dry needling.  Patient has no neck or shoulder complaints. No numbness or tingling.  feels weak, but thinks it is because it is painful.  Patient has been managing with Naproxen, Aleve maybe 4x/week.   He was previously going to the gym and doing general weightlifting. Currently avoiding pull-ups, chin-ups, preacher curls, OH " press.  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 He has to plan his jobs a little more carefully to avoid painful positions, but often unavoidable.    He was compliant with home exercise program.  Response to previous treatment: Significant decreased in symptoms  Functional change: Improved ability to perform all work activities, decreased anxiety regarding symptoms, return to weightlifting    Pain: 2/10  Location: right elbow      OBJECTIVE        (LBS)  Left:  100, 100, 95 (bent)  100, 103, 105 (straight)    Right:  95, 100, 100 (bent)  110, 100, 100 (straight)    Grossly symmetrical elbow range of motion, pain free  Limited pronation with reprduction of medial elbow symptoms     Reproduction of pain in antecubital fossa with resisted elbow flexion with pronation No pain with supinated or neutral .     Elbow flexion strength testin/5 at 90 deg  Strong and pain-free at 90 deg pronated  5/5 at 30 deg flexion in supination  Strong and pain-free at 30 deg flexion in pronation      Functional Screen:      SFMA FN: functional, nonpainful. FP: functional, painful. DP: dysfunctional, painful. DN: dysfunctional, nonpainful.   AROM cervical flexion Dysfunctional, non-painful   AROM cervical extension Dysfunctional, non-painful   AROM cervical rotation R: Dysfunctional, non-painful  L: Dysfunctional, non-painful   AROM Shoulder ER R: Dysfunctional, non-painful T4 limited thoracic extension  L: Dysfunctional, non-painful T4 limited thoracic extension   AROM Shoulder IR R: Dysfunctional, non-painful T5  L: Dysfunctional, non-painful T5   multi-segmental flexion  Dysfunctional, non-painful   multi-segmental extension Dysfunctional, non-painful   multi-segmental rotation  R: Dysfunctional, non-painful  L: Dysfunctional, non-painful   SLS R: Dysfunctional, non-painful  L: Dysfunctional, non-painful   Arms Down Deep Squat Dysfunctional, non-painful       Myotome Peripheral Right Left   C4 (Shoulder Elevation) Dorsal Scapular negative negative      C5 (Shoulder ABduction) Axillary negative    negative      C5 (Shoulder ER) Suprascapular  negative    negative      C6 (Shoulder IR) Subscapularis  negative    negative      C6 (Wrist extension) Radial negative    negative      C7 (Wrist flexion) Ulnar negative    negative      C7 (Elbow extension) Radial negative    negative      C8 (Thumb ABduction) Radial negative    negative      C8 (Opposition) Median negative    negative      T1 (1st/2nd MTP ADduction Median negative    negative      T1 (4th/5th MTP ADduction, Ulnar nerve) Ulnar negative    negative         Seated Thoracic Range of Motion:     % Pain   Right Rotation 75 (-)   Left Rotation 75 (-)      Upper Extremity Range of Motion (deg)  (R) UE AROM/PROM (L) UE AROM/PROM   Shoulder flexion: 165/170 Shoulder flexion: 165/170   Shoulder extension: 40/45 Shoulder extension: 40/45   Shoulder Abduction: 160/165 Shoulder abduction: 160/165   Shoulder ER T4/90 Shoulder ER T4/90   Shoulder IR T5/40 Shoulder IR T5/40   Elbow flexion/extension WNL Elbow flexion WNL      Upper Extremity Strength  (R) UE   (L) UE     Shoulder flexion: 4+/5 Shoulder flexion: 5/5   Shoulder extension: 4+/5 Shoulder extension: 4+/5   Shoulder Abduction: 5/5 Shoulder abduction: 5/5   Shoulder ER 5/5 Shoulder ER 5/5   Shoulder IR 5/5 Shoulder IR 5/5   Elbow flexion: 5/5 Elbow flexion: 5/5   Elbow extension: 5/5 Elbow extension: 5/5   Scapular retraction: 4+/5 Scapular retraction: 4+/5   Scapular protraction: 4+/5 Scapular Protraction: 4+/5      Special Tests (cervical):  Compression (-)   Distraction (-)   Spurlings (-)   Lateral Flexion Alar Ligament (-)   DNF test <15 sec      Special Tests (shoulder):    Right   Bicep Load I/II (-)   Speed's test (-) at end range extension and supination   Apprehension tests (-)      Elbow ligament testing: (-) varus/valgus     Joint Mobility: *  Cervical:  HYPOmobile  Wrist: WNL, extension 85 deg right, 85 deg left     Palpation:  wrist extensors, supinator, antecubital fossa, brachialis, brachioradialis  Non-tender to distal biceps tendon  Sensation: intact to light touch     Limitation/Restriction for FOTO Elbow Survey     Therapist reviewed FOTO scores for Augustine Boswell on 1/6/2023 and 01/30/2023.   FOTO documents entered into [employer] - see Media section.     Limitation Score: 48% ---> 42%           TREATMENT     Total Treatment time (time-based codes) separate from Evaluation: 55 minutes     Betito received the treatments listed below:      Betito received therapeutic exercises to develop strength, endurance, and ROM for 40 minutes including:  Isometric elbow flexion at ~30 deg elbow flexion 10x10 sec  - neutral  - supinated  - pronated  Preacher curls 3x8 each  - pronated 8, 10, 12#  - supinated 8, 10, 12#  - neutral 8, 10, 12#    Forearm supination and pronation with 3# dowel 3x1min each dir    Weight plate  holds 10#, 25# 3x20 sec bilaterally   - arm by side, 90 deg shoulder flexion  Weight plate pass 10# 4x20 sec  - x2 side-to-side  - x2 behind the back pass  Suitcase carry 40#, 50# 2x50 feet each arm  Arm bar 15# bell up KB 2x1 min bilaterally (NP)    Wrist extension stretch 2x45 sec  Biceps stretch 2x30 sec     Betito received the following manual therapy techniques: Joint mobilizations, Manual traction, Myofacial release, and Soft tissue Mobilization were applied to the: right elbow, wrist for 15 minutes, including:  Soft tissue mobilization, cross friction massage wrist extensors, supinator, biceps brachii, brachioradialis, brachialis  Lateral forearm mobilization with patient gripping    Functional Dry Needling:  Discussed the purpose, mechanism, and indications for functional dry needling with Betito . Patient was cleared of all precautions and contraindications and patient signed written consent and gave verbal consent to dry needling Rx today.     Palpation,  myotome testing, and physical exam used to determine dry needling treatment sites.     Patient received dry needling to the following areas:  Supinator, wrist extensors with e-stim    Patient tolerated the treatment well and demonstrated decreased tenderness to palpation  following treatment          PATIENT EDUCATION AND HOME EXERCISES     Home Exercises Provided and Patient Education Provided     Education provided:   - Presentation, prognosis, plan of care, HEP     Written Home Exercises Provided: yes. Exercises were reviewed and Betito was able to demonstrate them prior to the end of the session.  Betito demonstrated good  understanding of the education provided. See EMR under Patient Instructions for exercises provided during therapy sessions    ASSESSMENT     Betito demonstrates maintenance of , elbow flexion, supination/pronation strength gains and has decreased symptoms with his occupational tasks. He also demonstrates improved joint mobility at the wrist and elbow. He remains with functional limitations with gripping with work and recreational activities. He will return in 2 weeks for determination of discharge disposition.    Betito Is progressing well towards his goals.   Pt prognosis is Good.     Pt will continue to benefit from skilled outpatient physical therapy to address the deficits listed in the problem list box on initial evaluation, provide pt/family education and to maximize pt's level of independence in the home and community environment.     Pt's spiritual, cultural and educational needs considered and pt agreeable to plan of care and goals.     Anticipated barriers to physical therapy: None    Goals: Short Term Goals: 4 weeks   Patient will be independent with HEP for symptom management MET 01/30/2023  Patient will report >25% decrease in symptoms with normal home activity MET 01/30/2023     Long Term Goals: 8 weeks   Patient will be independent with progressive HEP for continued strengthening to  support return to previous level of function  Patient will have grossly symmetrical, pain-free range of motion for improved functional mobility MET 02/20/2023  Patient will increase strength of RUE by at least 1 grade via MMT testing to allow for improved performance of ADLs and daily functional tasks PROGRESSING  Patient will achieve <30% limitation as measured by the FOTO to demonstrate decreased functional disability  Patient will demonstrate grossly symmetrical  strength MET 01/30/2023    PLAN     LOAD! Open-chain resisted therapeutic exercise     Jim Orlando, PT

## 2023-02-23 ENCOUNTER — PATIENT MESSAGE (OUTPATIENT)
Dept: FAMILY MEDICINE | Facility: CLINIC | Age: 48
End: 2023-02-23

## 2023-02-27 ENCOUNTER — PATIENT MESSAGE (OUTPATIENT)
Dept: FAMILY MEDICINE | Facility: CLINIC | Age: 48
End: 2023-02-27

## 2023-02-27 DIAGNOSIS — Z00.00 HEALTHCARE MAINTENANCE: Primary | ICD-10-CM

## 2023-03-01 ENCOUNTER — PATIENT MESSAGE (OUTPATIENT)
Dept: FAMILY MEDICINE | Facility: CLINIC | Age: 48
End: 2023-03-01

## 2023-03-02 DIAGNOSIS — R35.0 URINARY FREQUENCY: Primary | ICD-10-CM

## 2023-03-02 NOTE — TELEPHONE ENCOUNTER
Patient states I am that is the problem actually I had to cut down on my water and coffee cause I was urinating frequently and the urine levels are diluted. Like its too much water

## 2023-03-06 ENCOUNTER — PATIENT MESSAGE (OUTPATIENT)
Dept: FAMILY MEDICINE | Facility: CLINIC | Age: 48
End: 2023-03-06

## 2023-03-06 ENCOUNTER — CLINICAL SUPPORT (OUTPATIENT)
Dept: REHABILITATION | Facility: HOSPITAL | Age: 48
End: 2023-03-06
Payer: MEDICAID

## 2023-03-06 DIAGNOSIS — M75.21 BICEPS TENDINITIS ON RIGHT: Primary | ICD-10-CM

## 2023-03-06 DIAGNOSIS — S56.911S: ICD-10-CM

## 2023-03-06 PROCEDURE — 97140 MANUAL THERAPY 1/> REGIONS: CPT | Mod: PO

## 2023-03-06 PROCEDURE — 97110 THERAPEUTIC EXERCISES: CPT | Mod: PO

## 2023-03-06 NOTE — PROGRESS NOTES
[facility]   Physical Therapy Progress Note     Name: Augustine Boswell  Clinic Number: [id number]    Therapy Diagnosis:   Encounter Diagnoses   Name Primary?    Biceps tendinitis on right Yes    Forearm strain, right, sequela        Physician: Seamus Dougherty, *    Visit Date: 3/6/2023    Physician Orders: PT Eval and Treat  Medical Diagnosis from Referral: Biceps tendinitis, right  Evaluation Date: 1/6/2023  Authorization Period Expiration: 01/04/2024  Plan of Care Expiration: 04/06/2023  Progress Note Due: 02/06/2023  Visit # / Visits authorized: 1/1 4/20   FOTO: Completed 01/06/2023     Precautions: Standard    Time In: 0801  Time Out: 0840  Total Billable Time: 25 minutes one-on-one with PT      SUBJECTIVE     Pt reports: He experienced a setback after returning to some relatively heavier weight at the gym. About 2 weeks ago, he did some biceps curls with 25# and since then he has had more soreness and biting pain deep in the front of the elbow.     History of current condition - 01/06/2023  Insidious onset of right elbow pain described as burning, sharp, grabbing, tight to the front (points to antecubital fossa) and lateral elbow, and forearm (points to wrist extensors). Right elbow pain with gripping, throwing, lifting, turning arm over. Sometimes his elbow feels unstable in full extension.  Patient reports he had some ultrasound and stretching exercises with minimal relief. He is interested in dry needling.  Patient has no neck or shoulder complaints. No numbness or tingling.  feels weak, but thinks it is because it is painful.  Patient has been managing with Naproxen, Aleve maybe 4x/week.   He was previously going to the gym and doing general weightlifting. Currently avoiding pull-ups, chin-ups, preacher curls, OH press.  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 He has to plan his jobs a little more carefully to  avoid painful positions, but often unavoidable.    He was compliant with home exercise program.  Response to previous treatment: Significant decreased in symptoms  Functional change: Improved ability to perform all work activities, decreased anxiety regarding symptoms, return to weightlifting    Pain: 7/10  Location: right elbow      OBJECTIVE       Grossly symmetrical elbow range of motion, pain free  Limited resisted pronation with reproduction of elbow symptoms     Reproduction of pain in antecubital fossa with resisted elbow flexion with pronation No pain with supinated or neutral .      Functional Screen:      SFMA FN: functional, nonpainful. FP: functional, painful. DP: dysfunctional, painful. DN: dysfunctional, nonpainful.   AROM cervical flexion Dysfunctional, non-painful   AROM cervical extension Dysfunctional, non-painful   AROM cervical rotation R: Dysfunctional, non-painful  L: Dysfunctional, non-painful   AROM Shoulder ER R: Dysfunctional, non-painful T4 limited thoracic extension  L: Dysfunctional, non-painful T4 limited thoracic extension   AROM Shoulder IR R: Dysfunctional, non-painful T5  L: Dysfunctional, non-painful T5   multi-segmental flexion  Dysfunctional, non-painful   multi-segmental extension Dysfunctional, non-painful   multi-segmental rotation  R: Dysfunctional, non-painful  L: Dysfunctional, non-painful   SLS R: Dysfunctional, non-painful  L: Dysfunctional, non-painful   Arms Down Deep Squat Dysfunctional, non-painful      Myotome Peripheral Right Left   C4 (Shoulder Elevation) Dorsal Scapular negative negative      C5 (Shoulder ABduction) Axillary negative    negative      C5 (Shoulder ER) Suprascapular  negative    negative      C6 (Shoulder IR) Subscapularis  negative    negative      C6 (Wrist extension) Radial negative    negative      C7 (Wrist flexion) Ulnar negative    negative      C7 (Elbow extension) Radial negative    negative      C8 (Thumb ABduction) Radial negative     negative      C8 (Opposition) Median negative    negative      T1 (1st/2nd MTP ADduction Median negative    negative      T1 (4th/5th MTP ADduction, Ulnar nerve) Ulnar negative    negative         Seated Thoracic Range of Motion:     % Pain   Right Rotation 75 (-)   Left Rotation 75 (-)      Upper Extremity Range of Motion (deg)  (R) UE AROM/PROM (L) UE AROM/PROM   Shoulder flexion: 165/170 Shoulder flexion: 165/170   Shoulder extension: 40/45 Shoulder extension: 40/45   Shoulder Abduction: 160/165 Shoulder abduction: 160/165   Shoulder ER T4/90 Shoulder ER T4/90   Shoulder IR T5/40 Shoulder IR T5/40   Elbow flexion/extension WNL Elbow flexion WNL      Upper Extremity Strength  (R) UE   (L) UE     Shoulder flexion: 4+/5 Shoulder flexion: 5/5   Shoulder extension: 4+/5 Shoulder extension: 4+/5   Shoulder Abduction: 5/5 Shoulder abduction: 5/5   Shoulder ER 5/5 Shoulder ER 5/5   Shoulder IR 5/5 Shoulder IR 5/5   Elbow flexion: 5/5 Elbow flexion: 5/5   Elbow extension: 5/5 Elbow extension: 5/5   Scapular retraction: 4+/5 Scapular retraction: 4+/5   Scapular protraction: 4+/5 Scapular Protraction: 4+/5      Special Tests (cervical):  Compression (-)   Distraction (-)   Spurlings (-)   Lateral Flexion Alar Ligament (-)   DNF test <15 sec      Special Tests (shoulder):    Right   Bicep Load I/II (-)   Speed's test (+) at end range extension and supination   Apprehension tests (-)      Elbow ligament testing: (-) varus/valgus     Joint Mobility: *  Cervical: HYPOmobile  Wrist: WNL, extension 85 deg right, 85 deg left     Palpation:  wrist extensors, supinator, antecubital fossa, brachialis, brachioradialis  Non-tender to distal biceps tendon  Sensation: intact to light touch     Limitation/Restriction for FOTO Elbow Survey     Therapist reviewed FOTO scores for Augustine Boswell on 1/6/2023 and 01/30/2023.   FOTO documents entered into [employer] - see Media section.     Limitation Score: 48% ---> 42%           TREATMENT     Total  Treatment time (time-based codes) separate from Evaluation: 55 minutes     Betito received the treatments listed below:      Betito received therapeutic exercises to develop strength, endurance, and ROM for 10 minutes including:  Forearm supination and pronation with 3# dowel 3x1min each dir  Resisted radial deviation with 3# dowel 3x1 min  Bicep curls with 5# DB  - supinated, neutral, pronated x15 each  - felt weak with pronated      Betito received the following manual therapy techniques: Joint mobilizations, Manual traction, Myofacial release, and Soft tissue Mobilization were applied to the: right elbow, wrist for 15 minutes, including:  Soft tissue mobilization, cross friction massage wrist extensors, supinator, biceps brachii, brachioradialis, brachialis  Lateral forearm mobilization with patient gripping    Functional Dry Needling:  Discussed the purpose, mechanism, and indications for functional dry needling with Betito . Patient was cleared of all precautions and contraindications and patient signed written consent and gave verbal consent to dry needling Rx today.     Palpation, myotome testing, and physical exam used to determine dry needling treatment sites.     Patient received dry needling to the following areas:  Pronator teres, Supinator, wrist extensors with e-stim    Patient tolerated the treatment well and demonstrated decreased tenderness to palpation  following treatment     PATIENT EDUCATION AND HOME EXERCISES     Home Exercises Provided and Patient Education Provided     Education provided:   - Presentation, prognosis, plan of care, HEP     Written Home Exercises Provided: yes. Exercises were reviewed and Betito was able to demonstrate them prior to the end of the session.  Betito demonstrated good  understanding of the education provided. See EMR under Patient Instructions for exercises provided during therapy sessions    ASSESSMENT     Betito demonstrates regression back to moderate-to-severe right elbow  pain, provoked with resisted pronation and elbow flexion near end ranges. He had previously made functional gains with gym and occupational activities. Recommend return to ortho for further evaluation due to failure to progress with current course of Physical Therapy.    Betito Is progressing well towards his goals.   Pt prognosis is Good.     Pt will continue to benefit from skilled outpatient physical therapy to address the deficits listed in the problem list box on initial evaluation, provide pt/family education and to maximize pt's level of independence in the home and community environment.     Pt's spiritual, cultural and educational needs considered and pt agreeable to plan of care and goals.     Anticipated barriers to physical therapy: None    Goals: Short Term Goals: 4 weeks   Patient will be independent with HEP for symptom management MET 01/30/2023  Patient will report >25% decrease in symptoms with normal home activity MET 01/30/2023     Long Term Goals: 8 weeks   Patient will be independent with progressive HEP for continued strengthening to support return to previous level of function MET 03/06/2023  Patient will have grossly symmetrical, pain-free range of motion for improved functional mobility MET 02/20/2023  Patient will increase strength of RUE by at least 1 grade via MMT testing to allow for improved performance of ADLs and daily functional tasks PROGRESSING  Patient will achieve <30% limitation as measured by the FOTO to demonstrate decreased functional disability NOT MET  Patient will demonstrate grossly symmetrical  strength MET 01/30/2023    PLAN   Return to ortho for further assessment    Jim Orlando, PT

## 2023-03-07 ENCOUNTER — TELEPHONE (OUTPATIENT)
Dept: FAMILY MEDICINE | Facility: CLINIC | Age: 48
End: 2023-03-07

## 2023-03-07 ENCOUNTER — PATIENT MESSAGE (OUTPATIENT)
Dept: FAMILY MEDICINE | Facility: CLINIC | Age: 48
End: 2023-03-07

## 2023-03-07 LAB
APPEARANCE UR: CLEAR
BACTERIA #/AREA URNS HPF: NORMAL /[HPF]
BILIRUB UR QL STRIP: NEGATIVE
CASTS URNS QL MICRO: NORMAL /LPF
COLOR UR: YELLOW
EPI CELLS #/AREA URNS HPF: NORMAL /HPF (ref 0–10)
GLUCOSE UR QL STRIP: NEGATIVE
HGB UR QL STRIP: NEGATIVE
KETONES UR QL STRIP: NEGATIVE
LEUKOCYTE ESTERASE UR QL STRIP: NEGATIVE
MICRO URNS: NORMAL
MICRO URNS: NORMAL
NITRITE UR QL STRIP: NEGATIVE
PH UR STRIP: 6.5 [PH] (ref 5–7.5)
PROT UR QL STRIP: NEGATIVE
RBC #/AREA URNS HPF: NORMAL /HPF (ref 0–2)
SP GR UR STRIP: 1.01 (ref 1–1.03)
URINALYSIS REFLEX: NORMAL
UROBILINOGEN UR STRIP-MCNC: 0.2 MG/DL (ref 0.2–1)
WBC #/AREA URNS HPF: NORMAL /HPF (ref 0–5)

## 2023-03-07 NOTE — TELEPHONE ENCOUNTER
----- Message from Terry Greenfield NP sent at 3/7/2023  8:37 AM CST -----  Please let patient know his urinalysis was normal.

## 2023-03-13 ENCOUNTER — OFFICE VISIT (OUTPATIENT)
Dept: ORTHOPEDICS | Facility: CLINIC | Age: 48
End: 2023-03-13
Payer: MEDICAID

## 2023-03-13 VITALS — HEIGHT: 76 IN | WEIGHT: 235 LBS | BODY MASS INDEX: 28.62 KG/M2

## 2023-03-13 DIAGNOSIS — M77.11 LATERAL EPICONDYLITIS, RIGHT ELBOW: Primary | ICD-10-CM

## 2023-03-13 PROCEDURE — 4010F PR ACE/ARB THEARPY RXD/TAKEN: ICD-10-PCS | Mod: CPTII,S$GLB,, | Performed by: PHYSICIAN ASSISTANT

## 2023-03-13 PROCEDURE — 3008F BODY MASS INDEX DOCD: CPT | Mod: CPTII,S$GLB,, | Performed by: PHYSICIAN ASSISTANT

## 2023-03-13 PROCEDURE — 99213 OFFICE O/P EST LOW 20 MIN: CPT | Mod: 25,S$GLB,, | Performed by: PHYSICIAN ASSISTANT

## 2023-03-13 PROCEDURE — 3008F PR BODY MASS INDEX (BMI) DOCUMENTED: ICD-10-PCS | Mod: CPTII,S$GLB,, | Performed by: PHYSICIAN ASSISTANT

## 2023-03-13 PROCEDURE — 4010F ACE/ARB THERAPY RXD/TAKEN: CPT | Mod: CPTII,S$GLB,, | Performed by: PHYSICIAN ASSISTANT

## 2023-03-13 PROCEDURE — 1160F RVW MEDS BY RX/DR IN RCRD: CPT | Mod: CPTII,S$GLB,, | Performed by: PHYSICIAN ASSISTANT

## 2023-03-13 PROCEDURE — 99213 PR OFFICE/OUTPT VISIT, EST, LEVL III, 20-29 MIN: ICD-10-PCS | Mod: 25,S$GLB,, | Performed by: PHYSICIAN ASSISTANT

## 2023-03-13 PROCEDURE — 1160F PR REVIEW ALL MEDS BY PRESCRIBER/CLIN PHARMACIST DOCUMENTED: ICD-10-PCS | Mod: CPTII,S$GLB,, | Performed by: PHYSICIAN ASSISTANT

## 2023-03-13 PROCEDURE — 1159F MED LIST DOCD IN RCRD: CPT | Mod: CPTII,S$GLB,, | Performed by: PHYSICIAN ASSISTANT

## 2023-03-13 PROCEDURE — 1159F PR MEDICATION LIST DOCUMENTED IN MEDICAL RECORD: ICD-10-PCS | Mod: CPTII,S$GLB,, | Performed by: PHYSICIAN ASSISTANT

## 2023-03-13 PROCEDURE — 20550 PR INJECT TENDON SHEATH/LIGAMENT: ICD-10-PCS | Mod: RT,S$GLB,, | Performed by: PHYSICIAN ASSISTANT

## 2023-03-13 PROCEDURE — 20550 NJX 1 TENDON SHEATH/LIGAMENT: CPT | Mod: RT,S$GLB,, | Performed by: PHYSICIAN ASSISTANT

## 2023-03-13 RX ORDER — NAPROXEN 500 MG/1
500 TABLET ORAL 2 TIMES DAILY
Qty: 60 TABLET | Refills: 3 | Status: SHIPPED | OUTPATIENT
Start: 2023-03-13 | End: 2023-04-12

## 2023-03-13 RX ORDER — TRIAMCINOLONE ACETONIDE 40 MG/ML
40 INJECTION, SUSPENSION INTRA-ARTICULAR; INTRAMUSCULAR
Status: DISCONTINUED | OUTPATIENT
Start: 2023-03-13 | End: 2023-03-13 | Stop reason: HOSPADM

## 2023-03-13 RX ADMIN — TRIAMCINOLONE ACETONIDE 40 MG: 40 INJECTION, SUSPENSION INTRA-ARTICULAR; INTRAMUSCULAR at 08:03

## 2023-03-13 NOTE — PROGRESS NOTES
[facility]  [address]  [place] [address]  Phone: [phone]   [phone]    Patient's PCP: Terry Greenfield NP  Referring Provider: No ref. provider found    Subjective:      Chief Complaint:   Chief Complaint   Patient presents with    Right Elbow - Pain     right elbow pain follow up. Has completed PT. States that his flexibility has improved, but it remains painful         Past Medical History:   Diagnosis Date    Allergy     Asthma     GERD (gastroesophageal reflux disease)     Hepatitis C virus infection cured after antiviral drug therapy     s/p epclusa, treated / cured - svr24 7/2021       Past Surgical History:   Procedure Laterality Date    FRACTURE SURGERY      right hand       Current Outpatient Medications   Medication Sig    albuterol (PROVENTIL HFA) 90 mcg/actuation inhaler Inhale 2 puffs into the lungs every 6 (six) hours as needed for Wheezing or Shortness of Breath. Rescue    ibuprofen (ADVIL,MOTRIN) 200 MG tablet Take 200 mg by mouth every 6 (six) hours as needed for Pain.    lisinopriL (PRINIVIL,ZESTRIL) 40 MG tablet Take 1 tablet (40 mg total) by mouth once daily.    multivit-mins no.63/iron/folic (M-VIT ORAL) Take by mouth once daily.    omeprazole (PRILOSEC) 20 MG capsule Take 20 mg by mouth once daily. PRN GERD    methylPREDNISolone (MEDROL DOSEPACK) 4 mg tablet use as directed (Patient not taking: Reported on 2/17/2023)    naproxen (NAPROSYN) 500 MG tablet Take 1 tablet (500 mg total) by mouth 2 (two) times daily as needed (headaches). (Patient not taking: Reported on 2/17/2023)    naproxen (NAPROSYN) 500 MG tablet Take 1 tablet (500 mg total) by mouth 2 (two) times daily.     No current facility-administered medications for this visit.       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  He complains of pain to the lateral aspect of the right elbow.  He is right-hand dominant.  Was last seen and injected for right lateral epicondylitis in August 2022.  He is generally done well for this.  He went to physical therapy as well.  He was last seen in our office for his right arm biceps tendinitis and did physical therapy for that as well.  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  He denies any new injury or trauma.    Review of Systems:    Constitutional: Negative for chills, fever and weight loss.   HENT: Negative for congestion.    Eyes: Negative for discharge and redness.   Respiratory: Negative for cough and shortness of breath.    Cardiovascular: Negative for chest pain.   Gastrointestinal: Negative for nausea and vomiting.   Musculoskeletal: See HPI.   Skin: Negative for rash.   Neurological: Negative for headaches.   Endo/Heme/Allergies: Does not bruise/bleed easily.   Psychiatric/Behavioral: The patient is not nervous/anxious.    All other systems reviewed and are negative.       Objective:      Physical Examination:    Vital Signs:  There were no vitals filed for this visit.    Body mass index is 28.61 kg/m².    This a well-developed, well nourished patient in no acute distress.  They are alert and oriented and cooperative to examination.     Right elbow exam:  Skin to the right elbow is clean dry and intact.  There is no erythema or ecchymosis.  There are no signs or symptoms of infection.  Patient is  neurovascularly intact throughout the right upper extremity.  Can fully open and close right hand into a fist.  He can oppose her right thumb to all digits in his right hand.  He is point tender to palpation over the right lateral epicondyle and he localizes this as the origin of his discomfort in area of maximal tenderness.  Nontender over the medial epicondyle.  He is no longer tender about the distal aspect of the biceps into the antecubital fossa.  He can fully flex/extend at the right elbow and pronate/supinate the right forearm.  Does have a reproduction of pain over the right lateral epicondyle with resisted right wrist extension.    Pertinent New Results:        XRAY Report / Interpretation:   No new radiographs were taken on today's clinic visit.      Assessment:       1. Lateral epicondylitis, right elbow      Plan:     Lateral epicondylitis, right elbow  -     Tendon Sheath  -     naproxen (NAPROSYN) 500 MG tablet; Take 1 tablet (500 mg total) by mouth 2 (two) times daily.  Dispense: 60 tablet; Refill: 3        Follow up if symptoms worsen or fail to improve.    I injected his right lateral epicondylar region today with 40 mg of Kenalog and lidocaine.  He tolerated this well.  I will start him on Naprosyn 500 mg by mouth twice a day with food.  We will reinstitute some of the home exercises learned through physical therapy from his previous treatment.  He will wear his tennis elbow strap that he has during waking hours.  I was specific in telling him not to wear while sleeping.  This is his 2nd injection, he is had various NSAIDs, and formal physical therapy.  If he does have a recurrence of symptoms again, we would likely proceed with advanced imaging with an MRI for potential surgical intervention.        SARAH Crockett, PA-C    This note was created using [employer] voice recognition software that occasionally misinterprets words or phrases.

## 2023-03-13 NOTE — PROCEDURES
Tendon Sheath    Date/Time: 3/13/2023 8:45 AM  Performed by: Seamus Dougherty PA-C  Authorized by: Seamus Dougherty PA-C     Consent Done?:  Yes (Verbal)  Indications:  Pain  Site marked: the procedure site was marked    Timeout: prior to procedure the correct patient, procedure, and site was verified    Prep: patient was prepped and draped in usual sterile fashion      Local anesthetic:  Lidocaine 1% without epinephrine  Location: right elbow.  Ultrasonic guidance for needle placement?: No    Needle size:  25 G  Medications:  40 mg triamcinolone acetonide 40 mg/mL  Patient tolerance:  Patient tolerated the procedure well with no immediate complications

## 2023-04-13 ENCOUNTER — TELEPHONE (OUTPATIENT)
Dept: ORTHOPEDICS | Facility: CLINIC | Age: 48
End: 2023-04-13

## 2023-04-13 ENCOUNTER — PATIENT MESSAGE (OUTPATIENT)
Dept: FAMILY MEDICINE | Facility: CLINIC | Age: 48
End: 2023-04-13

## 2023-04-14 DIAGNOSIS — M77.12 LATERAL EPICONDYLITIS, LEFT ELBOW: Primary | ICD-10-CM

## 2023-04-17 ENCOUNTER — OFFICE VISIT (OUTPATIENT)
Dept: ORTHOPEDICS | Facility: CLINIC | Age: 48
End: 2023-04-17
Payer: MEDICAID

## 2023-04-17 VITALS — BODY MASS INDEX: 26.55 KG/M2 | WEIGHT: 218 LBS | HEIGHT: 76 IN

## 2023-04-17 DIAGNOSIS — M77.12 LATERAL EPICONDYLITIS, LEFT ELBOW: Primary | ICD-10-CM

## 2023-04-17 PROCEDURE — 99213 PR OFFICE/OUTPT VISIT, EST, LEVL III, 20-29 MIN: ICD-10-PCS | Mod: S$GLB,,, | Performed by: PHYSICIAN ASSISTANT

## 2023-04-17 PROCEDURE — 1160F PR REVIEW ALL MEDS BY PRESCRIBER/CLIN PHARMACIST DOCUMENTED: ICD-10-PCS | Mod: CPTII,S$GLB,, | Performed by: PHYSICIAN ASSISTANT

## 2023-04-17 PROCEDURE — 1159F MED LIST DOCD IN RCRD: CPT | Mod: CPTII,S$GLB,, | Performed by: PHYSICIAN ASSISTANT

## 2023-04-17 PROCEDURE — 1160F RVW MEDS BY RX/DR IN RCRD: CPT | Mod: CPTII,S$GLB,, | Performed by: PHYSICIAN ASSISTANT

## 2023-04-17 PROCEDURE — 3008F BODY MASS INDEX DOCD: CPT | Mod: CPTII,S$GLB,, | Performed by: PHYSICIAN ASSISTANT

## 2023-04-17 PROCEDURE — 4010F PR ACE/ARB THEARPY RXD/TAKEN: ICD-10-PCS | Mod: CPTII,S$GLB,, | Performed by: PHYSICIAN ASSISTANT

## 2023-04-17 PROCEDURE — 3008F PR BODY MASS INDEX (BMI) DOCUMENTED: ICD-10-PCS | Mod: CPTII,S$GLB,, | Performed by: PHYSICIAN ASSISTANT

## 2023-04-17 PROCEDURE — 4010F ACE/ARB THERAPY RXD/TAKEN: CPT | Mod: CPTII,S$GLB,, | Performed by: PHYSICIAN ASSISTANT

## 2023-04-17 PROCEDURE — 99213 OFFICE O/P EST LOW 20 MIN: CPT | Mod: S$GLB,,, | Performed by: PHYSICIAN ASSISTANT

## 2023-04-17 PROCEDURE — 1159F PR MEDICATION LIST DOCUMENTED IN MEDICAL RECORD: ICD-10-PCS | Mod: CPTII,S$GLB,, | Performed by: PHYSICIAN ASSISTANT

## 2023-04-17 NOTE — PROGRESS NOTES
[facility]  [address]  [place] [address]  Phone: [phone]   [phone]    Patient's PCP: Terry Greenfield NP  Referring Provider: Terry Campos*    Subjective:      Chief Complaint:   Chief Complaint   Patient presents with    Left Elbow - Pain     Patient is here with complaints of Left Elbow pain x 2 years, Is an , Hyperextended it a few years back, we injected it 6-8 months ago and it helped, Would like to repeat the injection       Past Medical History:   Diagnosis Date    Allergy     Asthma     GERD (gastroesophageal reflux disease)     Hepatitis C virus infection cured after antiviral drug therapy     s/p epclusa, treated / cured - svr24 7/2021       Past Surgical History:   Procedure Laterality Date    FRACTURE SURGERY      right hand       Current Outpatient Medications   Medication Sig    albuterol (PROVENTIL HFA) 90 mcg/actuation inhaler Inhale 2 puffs into the lungs every 6 (six) hours as needed for Wheezing or Shortness of Breath. Rescue    ibuprofen (ADVIL,MOTRIN) 200 MG tablet Take 200 mg by mouth every 6 (six) hours as needed for Pain.    lisinopriL (PRINIVIL,ZESTRIL) 40 MG tablet Take 1 tablet (40 mg total) by mouth once daily.    multivit-mins no.63/iron/folic (M-VIT ORAL) Take by mouth once daily.    omeprazole (PRILOSEC) 20 MG capsule Take 20 mg by mouth once daily. PRN GERD    methylPREDNISolone (MEDROL DOSEPACK) 4 mg tablet use as directed (Patient not taking: Reported on 2/17/2023)    naproxen (NAPROSYN) 500 MG tablet Take 1 tablet (500 mg total) by mouth 2 (two) times daily as needed (headaches). (Patient not taking: Reported on 2/17/2023)     No current facility-administered medications for this visit.       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  We have seen him in the past for this multiple times.  We have injected it twice and sent him to physical therapy.  He is also tried various topical treatments to include a tennis elbow strap and various NSAIDs.  Unfortunately, he continues to have a recurrence of symptoms.  His most recent injection was approximately 5 months ago.    Review of Systems:    Constitutional: Negative for chills, fever and weight loss.   HENT: Negative for congestion.    Eyes: Negative for discharge and redness.   Respiratory: Negative for cough and shortness of breath.    Cardiovascular: Negative for chest pain.   Gastrointestinal: Negative for nausea and vomiting.   Musculoskeletal: See HPI.   Skin: Negative for rash.   Neurological: Negative for headaches.   Endo/Heme/Allergies: Does not bruise/bleed easily.   Psychiatric/Behavioral: The patient is not nervous/anxious.    All other systems reviewed and are negative.       Objective:      Physical Examination:    Vital Signs:  There were no vitals filed for this visit.    Body mass index is 26.54 kg/m².    This a well-developed, well nourished patient in no acute distress.  They are alert and oriented and cooperative to examination.     Left elbow exam: Skin to the left elbow is clean dry and intact.  There is no erythema or ecchymosis.  There are no signs or symptoms of infection.  Patient is neurovascularly intact throughout the left upper extremity.  He can fully flex/extend at the left elbow and pronate/supinate the left forearm.   He is point tender to palpation over the left lateral epicondyle.  He localizes as his area of maximal tenderness in origin of discomfort.  He is nontender over the medial epicondyle.  He is reproduction of pain over the lateral epicondyle with resisted left wrist extension.  He can open and close his left hand into a fist.  He can oppose his left thumb to all digits in his left hand.    Pertinent New Results:        XRAY Report / Interpretation:   No new radiographs were taken on today's clinic visit.      Assessment:       1. Lateral epicondylitis, left elbow      Plan:     Lateral epicondylitis, left elbow  -     Cancel: X-Ray Elbow 2 Views Left  -     Ambulatory referral/consult to Orthopedics  -     MRI Elbow Joint Without Contrast Left; Future; Expected date: 04/17/2023        Follow up for Tues/Thurs, MRI Results left elbow.    Patient has recurrent left lateral epicondylitis.  It has not responded to various conservative treatment measures to include injections, physical therapy, tennis elbow strap, various NSAIDs, and other topical treatments.  I would like to proceed with an MRI of his left elbow for further evaluation.  Will have follow-up with that data and make further orthopedic recommendations from there.        Seamus Dougherty, BRANDONS, PA-C    This note was created using [email or web address] voice recognition software that occasionally misinterprets words or phrases.

## 2023-04-26 ENCOUNTER — DOCUMENTATION ONLY (OUTPATIENT)
Dept: REHABILITATION | Facility: HOSPITAL | Age: 48
End: 2023-04-26
Payer: MEDICAID

## 2023-04-26 NOTE — PROGRESS NOTES
[facility]  Physical Therapy Discharge Note    Name: Augustine Boswell  [facility] Number: [id number]    Therapy Diagnosis:        Encounter Diagnoses   Name Primary?    Biceps tendinitis on right Yes    Forearm strain, right, sequela           Physician: Seamus Dougherty, *       Physician Orders: PT Eval and Treat  Medical Diagnosis from Referral: Biceps tendinitis, right  Evaluation Date: 1/6/2023  Authorization Period Expiration: 01/04/2024  Plan of Care Expiration: 04/06/2023  Progress Note Due: 02/06/2023  Visit # / Visits authorized: 4    ASSESSMENT      Discharge reason: Patient has not attended therapy since Visit Date: 3/6/2023    Goals: Goals: Short Term Goals: 4 weeks   Patient will be independent with HEP for symptom management MET 01/30/2023  Patient will report >25% decrease in symptoms with normal home activity MET 01/30/2023     Long Term Goals: 8 weeks   Patient will be independent with progressive HEP for continued strengthening to support return to previous level of function MET 03/06/2023  Patient will have grossly symmetrical, pain-free range of motion for improved functional mobility MET 02/20/2023  Patient will increase strength of RUE by at least 1 grade via MMT testing to allow for improved performance of ADLs and daily functional tasks PROGRESSING  Patient will achieve <30% limitation as measured by the FOTO to demonstrate decreased functional disability NOT MET  Patient will demonstrate grossly symmetrical  strength MET 01/30/2023    PLAN   This patient is discharged from physical therapy      Jim Orlando, PT

## 2023-04-27 ENCOUNTER — HOSPITAL ENCOUNTER (OUTPATIENT)
Dept: RADIOLOGY | Facility: HOSPITAL | Age: 48
Discharge: HOME | End: 2023-04-27
Attending: PHYSICIAN ASSISTANT
Payer: MEDICAID

## 2023-04-27 DIAGNOSIS — M77.12 LATERAL EPICONDYLITIS, LEFT ELBOW: ICD-10-CM

## 2023-04-27 PROCEDURE — 73221 MRI JOINT UPR EXTREM W/O DYE: CPT | Mod: TC,PO,LT

## 2023-05-02 ENCOUNTER — OFFICE VISIT (OUTPATIENT)
Dept: ORTHOPEDICS | Facility: CLINIC | Age: 48
End: 2023-05-02
Payer: MEDICAID

## 2023-05-02 ENCOUNTER — HOSPITAL ENCOUNTER (OUTPATIENT)
Dept: RADIOLOGY | Facility: HOSPITAL | Age: 48
Discharge: HOME | End: 2023-05-02
Attending: ORTHOPAEDIC SURGERY
Payer: MEDICAID

## 2023-05-02 ENCOUNTER — HOSPITAL ENCOUNTER (OUTPATIENT)
Dept: PREADMISSION TESTING | Facility: HOSPITAL | Age: 48
Discharge: HOME | End: 2023-05-02
Attending: ORTHOPAEDIC SURGERY
Payer: MEDICAID

## 2023-05-02 VITALS
SYSTOLIC BLOOD PRESSURE: 124 MMHG | HEIGHT: 76 IN | DIASTOLIC BLOOD PRESSURE: 74 MMHG | WEIGHT: 218 LBS | BODY MASS INDEX: 26.55 KG/M2

## 2023-05-02 DIAGNOSIS — M77.12 LATERAL EPICONDYLITIS, LEFT ELBOW: ICD-10-CM

## 2023-05-02 DIAGNOSIS — S56.512A PARTIAL TEAR OF COMMON EXTENSOR TENDON OF LEFT ELBOW: Primary | ICD-10-CM

## 2023-05-02 DIAGNOSIS — S56.512A PARTIAL TEAR OF COMMON EXTENSOR TENDON OF LEFT ELBOW: ICD-10-CM

## 2023-05-02 PROCEDURE — 1159F MED LIST DOCD IN RCRD: CPT | Mod: CPTII,S$GLB,, | Performed by: ORTHOPAEDIC SURGERY

## 2023-05-02 PROCEDURE — 1160F PR REVIEW ALL MEDS BY PRESCRIBER/CLIN PHARMACIST DOCUMENTED: ICD-10-PCS | Mod: CPTII,S$GLB,, | Performed by: ORTHOPAEDIC SURGERY

## 2023-05-02 PROCEDURE — 99213 OFFICE O/P EST LOW 20 MIN: CPT | Mod: S$GLB,,, | Performed by: ORTHOPAEDIC SURGERY

## 2023-05-02 PROCEDURE — 99213 PR OFFICE/OUTPT VISIT, EST, LEVL III, 20-29 MIN: ICD-10-PCS | Mod: S$GLB,,, | Performed by: ORTHOPAEDIC SURGERY

## 2023-05-02 PROCEDURE — 3074F SYST BP LT 130 MM HG: CPT | Mod: CPTII,S$GLB,, | Performed by: ORTHOPAEDIC SURGERY

## 2023-05-02 PROCEDURE — 3078F PR MOST RECENT DIASTOLIC BLOOD PRESSURE < 80 MM HG: ICD-10-PCS | Mod: CPTII,S$GLB,, | Performed by: ORTHOPAEDIC SURGERY

## 2023-05-02 PROCEDURE — 93010 ELECTROCARDIOGRAM REPORT: CPT | Mod: ,,, | Performed by: INTERNAL MEDICINE

## 2023-05-02 PROCEDURE — 93005 ELECTROCARDIOGRAM TRACING: CPT | Performed by: INTERNAL MEDICINE

## 2023-05-02 PROCEDURE — 71046 X-RAY EXAM CHEST 2 VIEWS: CPT | Mod: TC

## 2023-05-02 PROCEDURE — 93010 EKG 12-LEAD: ICD-10-PCS | Mod: ,,, | Performed by: INTERNAL MEDICINE

## 2023-05-02 PROCEDURE — 3074F PR MOST RECENT SYSTOLIC BLOOD PRESSURE < 130 MM HG: ICD-10-PCS | Mod: CPTII,S$GLB,, | Performed by: ORTHOPAEDIC SURGERY

## 2023-05-02 PROCEDURE — 1159F PR MEDICATION LIST DOCUMENTED IN MEDICAL RECORD: ICD-10-PCS | Mod: CPTII,S$GLB,, | Performed by: ORTHOPAEDIC SURGERY

## 2023-05-02 PROCEDURE — 1160F RVW MEDS BY RX/DR IN RCRD: CPT | Mod: CPTII,S$GLB,, | Performed by: ORTHOPAEDIC SURGERY

## 2023-05-02 PROCEDURE — 3078F DIAST BP <80 MM HG: CPT | Mod: CPTII,S$GLB,, | Performed by: ORTHOPAEDIC SURGERY

## 2023-05-02 PROCEDURE — 4010F PR ACE/ARB THEARPY RXD/TAKEN: ICD-10-PCS | Mod: CPTII,S$GLB,, | Performed by: ORTHOPAEDIC SURGERY

## 2023-05-02 PROCEDURE — 3008F PR BODY MASS INDEX (BMI) DOCUMENTED: ICD-10-PCS | Mod: CPTII,S$GLB,, | Performed by: ORTHOPAEDIC SURGERY

## 2023-05-02 PROCEDURE — 4010F ACE/ARB THERAPY RXD/TAKEN: CPT | Mod: CPTII,S$GLB,, | Performed by: ORTHOPAEDIC SURGERY

## 2023-05-02 PROCEDURE — 3008F BODY MASS INDEX DOCD: CPT | Mod: CPTII,S$GLB,, | Performed by: ORTHOPAEDIC SURGERY

## 2023-05-02 NOTE — PROGRESS NOTES
[facility] ORTHOPED[facility]    Subjective:     Chief Complaint:   Chief Complaint   Patient presents with    Left Elbow - Pain     Here for MRI results LT elbow       Past Medical History:   Diagnosis Date    Allergy     Asthma     GERD (gastroesophageal reflux disease)     Hepatitis C virus infection cured after antiviral drug therapy     s/p epclusa, treated / cured - svr24 7/2021       Past Surgical History:   Procedure Laterality Date    FRACTURE SURGERY      right hand       Current Outpatient Medications   Medication Sig    albuterol (PROVENTIL HFA) 90 mcg/actuation inhaler Inhale 2 puffs into the lungs every 6 (six) hours as needed for Wheezing or Shortness of Breath. Rescue    ibuprofen (ADVIL,MOTRIN) 200 MG tablet Take 200 mg by mouth every 6 (six) hours as needed for Pain.    lisinopriL (PRINIVIL,ZESTRIL) 40 MG tablet Take 1 tablet (40 mg total) by mouth once daily.    multivit-mins no.63/iron/folic (M-VIT ORAL) Take by mouth once daily.    omeprazole (PRILOSEC) 20 MG capsule Take 20 mg by mouth once daily. PRN GERD    methylPREDNISolone (MEDROL DOSEPACK) 4 mg tablet use as directed (Patient not taking: Reported on 2/17/2023)    naproxen (NAPROSYN) 500 MG tablet Take 1 tablet (500 mg total) by mouth 2 (two) times daily as needed (headaches). (Patient not taking: Reported on 2/17/2023)     No current facility-administered medications for this visit.       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  He continues complain of severe left elbow pain.  This been going on for well over a year.  He has had his MRI      Review of Systems:    Constitution: Negative for chills, fever, and sweats.  Negative for unexplained weight loss.    HENT:  Negative for headaches and blurry vision.    Cardiovascular:Negative for chest pain or irregular heart beat. Negative for hypertension.    Respiratory:  Negative for cough and shortness of breath.    Gastrointestinal: Negative for abdominal pain, heartburn, melena, nausea, and vomitting.    Genitourinary:  Negative bladder incontinence and dysuria.    Musculoskeletal:  See HPI for details.     Neurological: Negative for numbness.    Psychiatric/Behavioral: Negative for depression.  The patient is not nervous/anxious.      Endocrine: Negative for polyuria    Hematologic/Lymphatic: Negative for bleeding problem.  Does not bruise/bleed easily.    Skin: Negative for poor would healing and rash    Objective:      Physical Examination:    Vital Signs:    Vitals:    05/02/23 0823   BP: 124/74       Body mass index is 26.54 kg/m².    This a well-developed, well nourished patient in no acute distress.  They are alert and oriented and cooperative to examination.        Patient has full range of motion of the fingers and wrist.  He has a lot of pain with extension of the fingers and wrist.  No palpable defects in the extensor tendon  Pertinent New Results:  MRIs attached and reviewed.  Demonstrates a full-thickness common extensor tendon rupture consistent with chronic lateral epicondylitis  XRAY Report / Interpretation:       Assessment/Plan:      Severe chronic lateral epicondylitis.  I have offered him lateral epicondylar release with common extensor repair.  The risks and benefits discussed in great detail.  He understood wished to proceed      This note was created using Dragon voice recognition  software that occasionally misinterpreted phrases or words.

## 2023-05-02 NOTE — H&P (VIEW-ONLY)
[facility] ORTHOPED[facility]    Subjective:     Chief Complaint:   Chief Complaint   Patient presents with    Left Elbow - Pain     Here for MRI results LT elbow       Past Medical History:   Diagnosis Date    Allergy     Asthma     GERD (gastroesophageal reflux disease)     Hepatitis C virus infection cured after antiviral drug therapy     s/p epclusa, treated / cured - svr24 7/2021       Past Surgical History:   Procedure Laterality Date    FRACTURE SURGERY      right hand       Current Outpatient Medications   Medication Sig    albuterol (PROVENTIL HFA) 90 mcg/actuation inhaler Inhale 2 puffs into the lungs every 6 (six) hours as needed for Wheezing or Shortness of Breath. Rescue    ibuprofen (ADVIL,MOTRIN) 200 MG tablet Take 200 mg by mouth every 6 (six) hours as needed for Pain.    lisinopriL (PRINIVIL,ZESTRIL) 40 MG tablet Take 1 tablet (40 mg total) by mouth once daily.    multivit-mins no.63/iron/folic (M-VIT ORAL) Take by mouth once daily.    omeprazole (PRILOSEC) 20 MG capsule Take 20 mg by mouth once daily. PRN GERD    methylPREDNISolone (MEDROL DOSEPACK) 4 mg tablet use as directed (Patient not taking: Reported on 2/17/2023)    naproxen (NAPROSYN) 500 MG tablet Take 1 tablet (500 mg total) by mouth 2 (two) times daily as needed (headaches). (Patient not taking: Reported on 2/17/2023)     No current facility-administered medications for this visit.       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  He continues complain of severe left elbow pain.  This been going on for well over a year.  He has had his MRI      Review of Systems:    Constitution: Negative for chills, fever, and sweats.  Negative for unexplained weight loss.    HENT:  Negative for headaches and blurry vision.    Cardiovascular:Negative for chest pain or irregular heart beat. Negative for hypertension.    Respiratory:  Negative for cough and shortness of breath.    Gastrointestinal: Negative for abdominal pain, heartburn, melena, nausea, and vomitting.    Genitourinary:  Negative bladder incontinence and dysuria.    Musculoskeletal:  See HPI for details.     Neurological: Negative for numbness.    Psychiatric/Behavioral: Negative for depression.  The patient is not nervous/anxious.      Endocrine: Negative for polyuria    Hematologic/Lymphatic: Negative for bleeding problem.  Does not bruise/bleed easily.    Skin: Negative for poor would healing and rash    Objective:      Physical Examination:    Vital Signs:    Vitals:    05/02/23 0823   BP: 124/74       Body mass index is 26.54 kg/m².    This a well-developed, well nourished patient in no acute distress.  They are alert and oriented and cooperative to examination.        Patient has full range of motion of the fingers and wrist.  He has a lot of pain with extension of the fingers and wrist.  No palpable defects in the extensor tendon  Pertinent New Results:  MRIs attached and reviewed.  Demonstrates a full-thickness common extensor tendon rupture consistent with chronic lateral epicondylitis  XRAY Report / Interpretation:       Assessment/Plan:      Severe chronic lateral epicondylitis.  I have offered him lateral epicondylar release with common extensor repair.  The risks and benefits discussed in great detail.  He understood wished to proceed      This note was created using Dragon voice recognition  software that occasionally misinterpreted phrases or words.

## 2023-05-02 NOTE — DISCHARGE INSTRUCTIONS
INSTRUCTIONS  To confirm your doctor has scheduled your surgery for: Wednesday May 3rd    Morning of surgery please check in with registration near Parking Garage Entrance then proceed to Outpatient Surgery Department.    Preop nurses will call the afternoon prior to surgery between 4:00 and 6:00 PM with your final arrival time.  PLEASE NOTE:  The surgery schedule has many variables which may affect the time of your surgery case. Family members should be available if your surgery time changes. Plan to be here the day of your procedure between 4-6 hours.    TAKE ONLY THESE MEDICATIONS WITH A SMALL SIP OF WATER THE MORNING OF SURGERY: see list    DO NOT TAKE THESE MEDICATIONS 5-7 DAYS PRIOR to your procedure per your surgeon's request: ASPIRIN, ALEVE, BC powder, ZABRINA SELTZER, IBUPROFEN, FISH OIL, VITAMIN E, OR HERBALS   (May take Tylenol)    If you are prescribed any types of blood thinners (Aspirin, Coumadin, Plavix, Pradaxa, Xarelto, Aggrenox, Effient, Eliquis, Savasya, Brilinta or any other), please ask your surgeon how many days before scheduled procedure should you stop taking them. You may also need to verify with prescribing physician if it is OK to stop your blood thinners.      INSTRUCTIONS IMPORTANT!!  Do not eat or drink anything after midnight.  ONLY if you are diabetic, check your sugar in the morning before your procedure.  Do not smoke, vape or drink alcoholic beverages 24 hours prior to your procedure.  Shower the night before AND the morning of your procedure with a Chlorhexidine wash such as hibiclens or Dial antibacterial soap from neck down. You may use your own shampoo and face wash. This helps your skin to be as bacteria free as possible.  If you wear contact lenses, dentures, hearing aids or glasses, bring a container to put them in and give to a family member.    Please leave all jewelry, piercings and valuables at home.  DO NOT remove hair from the surgery site.   If your condition changes  such as fever, cough, etc, please notify your surgeon.   ONLY if you have been diagnosed with sleep apnea please bring your C-PAP machine.  ONLY if you wear home oxygen please bring your portable oxygen tank the day of your procedure.   ONLY for patients requiring bowel prep, written instructions will be given by your doctor's office.  ONLY if you have a neuro stimulator, please bring the controller with you the morning of surgery  Make arrangements in advance for transportation home by a responsible adult.  You must make arrangements for transportation, TAXI'S, UBER'S OR LYFTS ARE NOT ALLOWED.        If you have any questions about these instructions, call Pre-Op Admit  Nursing at [phone] or the Pre-Op Day Surgery Unit at [phone].

## 2023-05-03 ENCOUNTER — ANESTHESIA (OUTPATIENT)
Dept: SURGERY | Facility: HOSPITAL | Age: 48
End: 2023-05-03
Payer: MEDICAID

## 2023-05-03 ENCOUNTER — ANESTHESIA EVENT (OUTPATIENT)
Dept: SURGERY | Facility: HOSPITAL | Age: 48
End: 2023-05-03
Payer: MEDICAID

## 2023-05-03 ENCOUNTER — HOSPITAL ENCOUNTER (OUTPATIENT)
Facility: HOSPITAL | Age: 48
Discharge: HOME | End: 2023-05-03
Attending: ORTHOPAEDIC SURGERY | Admitting: ORTHOPAEDIC SURGERY
Payer: MEDICAID

## 2023-05-03 VITALS
TEMPERATURE: 97 F | HEART RATE: 48 BPM | RESPIRATION RATE: 14 BRPM | OXYGEN SATURATION: 97 % | DIASTOLIC BLOOD PRESSURE: 85 MMHG | SYSTOLIC BLOOD PRESSURE: 133 MMHG

## 2023-05-03 DIAGNOSIS — S56.512A PARTIAL TEAR OF COMMON EXTENSOR TENDON OF LEFT ELBOW: Primary | ICD-10-CM

## 2023-05-03 DIAGNOSIS — M77.12 LATERAL EPICONDYLITIS, LEFT ELBOW: ICD-10-CM

## 2023-05-03 PROCEDURE — 63600175 PHARM REV CODE 636 W HCPCS: Performed by: ANESTHESIOLOGY

## 2023-05-03 PROCEDURE — 01712 ANES PX NRV MSC UPR A&E TNOT: CPT | Performed by: ORTHOPAEDIC SURGERY

## 2023-05-03 PROCEDURE — 25000003 PHARM REV CODE 250: Performed by: NURSE ANESTHETIST, CERTIFIED REGISTERED

## 2023-05-03 PROCEDURE — 71000033 HC RECOVERY, INTIAL HOUR: Performed by: ORTHOPAEDIC SURGERY

## 2023-05-03 PROCEDURE — D9220A PRA ANESTHESIA: ICD-10-PCS | Mod: ANES,,, | Performed by: STUDENT IN AN ORGANIZED HEALTH CARE EDUCATION/TRAINING PROGRAM

## 2023-05-03 PROCEDURE — 36000706: Performed by: ORTHOPAEDIC SURGERY

## 2023-05-03 PROCEDURE — 71000015 HC POSTOP RECOV 1ST HR: Performed by: ORTHOPAEDIC SURGERY

## 2023-05-03 PROCEDURE — 71000039 HC RECOVERY, EACH ADD'L HOUR: Performed by: ORTHOPAEDIC SURGERY

## 2023-05-03 PROCEDURE — 37000008 HC ANESTHESIA 1ST 15 MINUTES: Performed by: ORTHOPAEDIC SURGERY

## 2023-05-03 PROCEDURE — 63600175 PHARM REV CODE 636 W HCPCS: Performed by: ORTHOPAEDIC SURGERY

## 2023-05-03 PROCEDURE — 27201423 OPTIME MED/SURG SUP & DEVICES STERILE SUPPLY: Performed by: ORTHOPAEDIC SURGERY

## 2023-05-03 PROCEDURE — 63600175 PHARM REV CODE 636 W HCPCS: Performed by: STUDENT IN AN ORGANIZED HEALTH CARE EDUCATION/TRAINING PROGRAM

## 2023-05-03 PROCEDURE — D9220A PRA ANESTHESIA: ICD-10-PCS | Mod: CRNA,,, | Performed by: NURSE ANESTHETIST, CERTIFIED REGISTERED

## 2023-05-03 PROCEDURE — 36000707: Performed by: ORTHOPAEDIC SURGERY

## 2023-05-03 PROCEDURE — D9220A PRA ANESTHESIA: Mod: ANES,,, | Performed by: STUDENT IN AN ORGANIZED HEALTH CARE EDUCATION/TRAINING PROGRAM

## 2023-05-03 PROCEDURE — 37000009 HC ANESTHESIA EA ADD 15 MINS: Performed by: ORTHOPAEDIC SURGERY

## 2023-05-03 PROCEDURE — D9220A PRA ANESTHESIA: Mod: CRNA,,, | Performed by: NURSE ANESTHETIST, CERTIFIED REGISTERED

## 2023-05-03 PROCEDURE — 63600175 PHARM REV CODE 636 W HCPCS: Performed by: NURSE ANESTHETIST, CERTIFIED REGISTERED

## 2023-05-03 PROCEDURE — 25000003 PHARM REV CODE 250: Performed by: STUDENT IN AN ORGANIZED HEALTH CARE EDUCATION/TRAINING PROGRAM

## 2023-05-03 RX ORDER — FAMOTIDINE 10 MG/ML
INJECTION INTRAVENOUS
Status: DISCONTINUED | OUTPATIENT
Start: 2023-05-03 | End: 2023-05-03

## 2023-05-03 RX ORDER — ONDANSETRON 2 MG/ML
INJECTION INTRAMUSCULAR; INTRAVENOUS
Status: DISCONTINUED | OUTPATIENT
Start: 2023-05-03 | End: 2023-05-03

## 2023-05-03 RX ORDER — HYDROMORPHONE HYDROCHLORIDE 1 MG/ML
0.2 INJECTION, SOLUTION INTRAMUSCULAR; INTRAVENOUS; SUBCUTANEOUS EVERY 5 MIN PRN
Status: DISCONTINUED | OUTPATIENT
Start: 2023-05-03 | End: 2023-05-03

## 2023-05-03 RX ORDER — TRAMADOL HYDROCHLORIDE 50 MG/1
50 TABLET ORAL EVERY 6 HOURS PRN
Qty: 28 TABLET | Refills: 0 | Status: SHIPPED | OUTPATIENT
Start: 2023-05-03 | End: 2023-05-10

## 2023-05-03 RX ORDER — LIDOCAINE HYDROCHLORIDE 20 MG/ML
JELLY TOPICAL
Status: DISCONTINUED | OUTPATIENT
Start: 2023-05-03 | End: 2023-05-03

## 2023-05-03 RX ORDER — FENTANYL CITRATE 50 UG/ML
INJECTION, SOLUTION INTRAMUSCULAR; INTRAVENOUS
Status: DISCONTINUED | OUTPATIENT
Start: 2023-05-03 | End: 2023-05-03

## 2023-05-03 RX ORDER — LIDOCAINE HYDROCHLORIDE 20 MG/ML
INJECTION, SOLUTION EPIDURAL; INFILTRATION; INTRACAUDAL; PERINEURAL
Status: DISCONTINUED | OUTPATIENT
Start: 2023-05-03 | End: 2023-05-03

## 2023-05-03 RX ORDER — ROCURONIUM BROMIDE 10 MG/ML
INJECTION, SOLUTION INTRAVENOUS
Status: DISCONTINUED | OUTPATIENT
Start: 2023-05-03 | End: 2023-05-03

## 2023-05-03 RX ORDER — ONDANSETRON 2 MG/ML
4 INJECTION INTRAMUSCULAR; INTRAVENOUS DAILY PRN
Status: DISCONTINUED | OUTPATIENT
Start: 2023-05-03 | End: 2023-05-03 | Stop reason: HOSPADM

## 2023-05-03 RX ORDER — MIDAZOLAM HYDROCHLORIDE 1 MG/ML
INJECTION INTRAMUSCULAR; INTRAVENOUS
Status: DISCONTINUED | OUTPATIENT
Start: 2023-05-03 | End: 2023-05-03

## 2023-05-03 RX ORDER — KETOROLAC TROMETHAMINE 30 MG/ML
30 INJECTION, SOLUTION INTRAMUSCULAR; INTRAVENOUS ONCE
Status: COMPLETED | OUTPATIENT
Start: 2023-05-03 | End: 2023-05-03

## 2023-05-03 RX ORDER — DIPHENHYDRAMINE HYDROCHLORIDE 50 MG/ML
12.5 INJECTION INTRAMUSCULAR; INTRAVENOUS
Status: DISCONTINUED | OUTPATIENT
Start: 2023-05-03 | End: 2023-05-03 | Stop reason: HOSPADM

## 2023-05-03 RX ORDER — PROPOFOL 10 MG/ML
VIAL (ML) INTRAVENOUS
Status: DISCONTINUED | OUTPATIENT
Start: 2023-05-03 | End: 2023-05-03

## 2023-05-03 RX ORDER — IBUPROFEN 800 MG/1
800 TABLET ORAL 3 TIMES DAILY
Qty: 90 TABLET | Refills: 0 | Status: SHIPPED | OUTPATIENT
Start: 2023-05-03 | End: 2023-10-03

## 2023-05-03 RX ORDER — OXYCODONE HYDROCHLORIDE 5 MG/1
5 TABLET ORAL
Status: DISCONTINUED | OUTPATIENT
Start: 2023-05-03 | End: 2023-05-03 | Stop reason: HOSPADM

## 2023-05-03 RX ORDER — CEFAZOLIN SODIUM 2 G/50ML
2 SOLUTION INTRAVENOUS
Status: COMPLETED | OUTPATIENT
Start: 2023-05-03 | End: 2023-05-03

## 2023-05-03 RX ORDER — SUCCINYLCHOLINE CHLORIDE 20 MG/ML
INJECTION INTRAMUSCULAR; INTRAVENOUS
Status: DISCONTINUED | OUTPATIENT
Start: 2023-05-03 | End: 2023-05-03

## 2023-05-03 RX ORDER — KETAMINE HYDROCHLORIDE 50 MG/ML
INJECTION, SOLUTION INTRAMUSCULAR; INTRAVENOUS
Status: DISCONTINUED | OUTPATIENT
Start: 2023-05-03 | End: 2023-05-03

## 2023-05-03 RX ORDER — ACETAMINOPHEN 10 MG/ML
INJECTION, SOLUTION INTRAVENOUS
Status: DISCONTINUED | OUTPATIENT
Start: 2023-05-03 | End: 2023-05-03

## 2023-05-03 RX ORDER — HYDROMORPHONE HYDROCHLORIDE 1 MG/ML
1 INJECTION, SOLUTION INTRAMUSCULAR; INTRAVENOUS; SUBCUTANEOUS EVERY 5 MIN PRN
Status: COMPLETED | OUTPATIENT
Start: 2023-05-03 | End: 2023-05-03

## 2023-05-03 RX ADMIN — KETAMINE HYDROCHLORIDE 15 MG: 50 INJECTION INTRAMUSCULAR; INTRAVENOUS at 12:05

## 2023-05-03 RX ADMIN — FENTANYL CITRATE 100 MCG: 50 INJECTION, SOLUTION INTRAMUSCULAR; INTRAVENOUS at 12:05

## 2023-05-03 RX ADMIN — HYDROMORPHONE HYDROCHLORIDE 0.2 MG: 1 INJECTION, SOLUTION INTRAMUSCULAR; INTRAVENOUS; SUBCUTANEOUS at 01:05

## 2023-05-03 RX ADMIN — Medication 140 MG: at 12:05

## 2023-05-03 RX ADMIN — LIDOCAINE HYDROCHLORIDE 1 EACH: 20 JELLY TOPICAL at 12:05

## 2023-05-03 RX ADMIN — HYDROMORPHONE HYDROCHLORIDE 0.2 MG: 1 INJECTION, SOLUTION INTRAMUSCULAR; INTRAVENOUS; SUBCUTANEOUS at 02:05

## 2023-05-03 RX ADMIN — ACETAMINOPHEN 1000 MG: 10 INJECTION, SOLUTION INTRAVENOUS at 12:05

## 2023-05-03 RX ADMIN — SODIUM CHLORIDE, SODIUM LACTATE, POTASSIUM CHLORIDE, AND CALCIUM CHLORIDE: .6; .31; .03; .02 INJECTION, SOLUTION INTRAVENOUS at 12:05

## 2023-05-03 RX ADMIN — MIDAZOLAM HYDROCHLORIDE 2 MG: 1 INJECTION, SOLUTION INTRAMUSCULAR; INTRAVENOUS at 12:05

## 2023-05-03 RX ADMIN — ONDANSETRON 4 MG: 2 INJECTION INTRAMUSCULAR; INTRAVENOUS at 12:05

## 2023-05-03 RX ADMIN — FAMOTIDINE 20 MG: 10 INJECTION, SOLUTION INTRAVENOUS at 12:05

## 2023-05-03 RX ADMIN — ROCURONIUM BROMIDE 5 MG: 10 INJECTION, SOLUTION INTRAVENOUS at 12:05

## 2023-05-03 RX ADMIN — KETOROLAC TROMETHAMINE 30 MG: 30 INJECTION, SOLUTION INTRAMUSCULAR; INTRAVENOUS at 02:05

## 2023-05-03 RX ADMIN — PROPOFOL 200 MG: 10 INJECTION, EMULSION INTRAVENOUS at 12:05

## 2023-05-03 RX ADMIN — CEFAZOLIN SODIUM 2 G: 2 SOLUTION INTRAVENOUS at 12:05

## 2023-05-03 RX ADMIN — LIDOCAINE HYDROCHLORIDE 60 MG: 20 INJECTION, SOLUTION INTRAVENOUS at 12:05

## 2023-05-03 RX ADMIN — OXYCODONE HYDROCHLORIDE 5 MG: 5 TABLET ORAL at 01:05

## 2023-05-03 NOTE — ANESTHESIA PREPROCEDURE EVALUATION
05/03/2023  Augustine Boswell is a 47 y.o., male.      Patient Active Problem List   Diagnosis    Polysubstance dependence including opioid type drug, episodic abuse    IVDU (intravenous drug user)    Elevated BP without diagnosis of hypertension    Anxiety    BMI 28.0-28.9,adult    Hepatitis C virus infection cured after antiviral drug therapy    Biceps tendinitis on right    Forearm strain, right, sequela       Past Surgical History:   Procedure Laterality Date    FRACTURE SURGERY      right hand        Tobacco Use:  The patient  reports that he has quit smoking. His smokeless tobacco use includes chew.     Results for orders placed or performed during the hospital encounter of 05/02/23   EKG 12-lead    Collection Time: 05/02/23 12:02 PM    Narrative    Test Reason : S56.512A,M77.12,    Vent. Rate : 058 BPM     Atrial Rate : 058 BPM     P-R Int : 170 ms          QRS Dur : 102 ms      QT Int : 432 ms       P-R-T Axes : 076 054 053 degrees     QTc Int : 424 ms    Sinus bradycardia  Otherwise normal ECG  No previous ECGs available    Referred By:             Confirmed By:              Lab Results   Component Value Date    WBC 5.90 05/02/2023    HGB 15.9 05/02/2023    HCT 46.3 05/02/2023    MCV 93 05/02/2023     05/02/2023     BMP  Lab Results   Component Value Date     05/02/2023    K 3.8 05/02/2023     05/02/2023    CO2 27 05/02/2023    BUN 20 05/02/2023    CREATININE 1.2 05/02/2023    CALCIUM 9.2 05/02/2023    ANIONGAP 5 (L) 05/02/2023    GLU 74 05/02/2023    GLU 93 01/26/2023     (H) 07/29/2022       No results found for this or any previous visit.            Pre-op Assessment    I have reviewed the Patient Summary Reports.     I have reviewed the Nursing Notes. I have reviewed the NPO Status.   I have reviewed the Medications.     Review of Systems  Anesthesia Hx:  No  problems with previous Anesthesia  Denies Family Hx of Anesthesia complications.   Denies Personal Hx of Anesthesia complications.   Social:  Former Smoker    Hematology/Oncology:  Hematology Normal   Oncology Normal     EENT/Dental:EENT/Dental Normal   Cardiovascular:  Cardiovascular Normal  ECG has been reviewed.    Pulmonary:   Asthma asymptomatic    Renal/:  Renal/ Normal     Hepatic/GI:   GERD, well controlled    Musculoskeletal:  Musculoskeletal Normal    Neurological:  Neurology Normal    Endocrine:  Endocrine Normal    Psych:   anxiety          Physical Exam  General: Well nourished, Cooperative, Alert and Oriented    Airway:  Mallampati: II   Mouth Opening: Normal  TM Distance: Normal  Tongue: Normal  Neck ROM: Normal ROM    Dental:  Intact    Chest/Lungs:  Clear to auscultation    Heart:  Rate: Normal  Rhythm: Regular Rhythm  Sounds: Normal        Anesthesia Plan  Type of Anesthesia, risks & benefits discussed:    Anesthesia Type: Gen ETT  Intra-op Monitoring Plan: Standard ASA Monitors  Post Op Pain Control Plan: multimodal analgesia  Induction:  IV  Airway Plan: Video and Direct  Informed Consent: Informed consent signed with the Patient and all parties understand the risks and agree with anesthesia plan.  All questions answered.   ASA Score: 2  Anesthesia Plan Notes: GERD that is severe and comes up to the back of throat - pt took antacid medication this morning.  Pepcid, GETA    Hx of opiate use in the past.  Multimodal with ofirmev and ketamine     Ready For Surgery From Anesthesia Perspective.     .

## 2023-05-03 NOTE — OP NOTE
[facility]  Surgery Department  Operative Note    SUMMARY     Date of Procedure: 5/3/2023     Procedure:  Primary repair of common extensor tendon left elbow                       Left lateral epicondylar release left elbow    Surgeon(s) and Role:     * Gian Gonsalves MD - Primary    Assisting Surgeon:  Ajith    Pre-Operative Diagnosis: Partial tear of common extensor tendon of left elbow [S56.512A]  Lateral epicondylitis, left elbow [M77.12]    Post-Operative Diagnosis: Post-Op Diagnosis Codes:     * Partial tear of common extensor tendon of left elbow [S56.512A]     * Lateral epicondylitis, left elbow [M77.12]    Anesthesia: Local MAC    Intraoperative Findings:  The common extensor tendon was completely ruptured off the lateral epicondylar region.  There was tremendous degeneration of the ECRB    Description of the Findings of the Procedure:  Patient was placed on the operative table in supine position.  He was prepped and draped in the usual sterile manner for surgery.  Incision was made directly over the lateral epicondyle and extended towards the radial head.  It was carried down sharply through the skin.  The interval between the ECRL and the extensor digitorum longus was split.  The ECRB was completely ruptured and degenerative.  The degenerative tissue was excised and the common extensor attachment was cleaned and scraped with a rongeur.  Once all the degenerative tissue was gone we used a 1. Vicryl and closed the interval along with the common extensor.  This restored the common extensor back to the lateral epicondyle.  We copiously irrigated.  We closed the subQ with 3-0 Vicryl and the skin with 4-0 Monocryl.  Sterile dressings were applied the patient was noted to be in stable condition    Complications: No    Estimated Blood Loss (EBL): * No values recorded between 5/3/2023 12:00 AM and 5/3/2023  1:02 PM *           Implants: * No implants in log *    Specimens:   Specimen (24h ago,  onward)      None                    Condition: Good    Disposition: PACU - hemodynamically stable.                Discharge Note    SUMMARY     Admit Date: 5/3/2023    Discharge Date and Time:  05/03/2023 1:02 PM    Hospital Course (synopsis of major diagnoses, care, treatment, and services provided during the course of the hospital stay): Uneventful      Final Diagnosis: Post-Op Diagnosis Codes:     * Partial tear of common extensor tendon of left elbow [S56.512A]     * Lateral epicondylitis, left elbow [M77.12]    Disposition: Home or Self Care    Follow Up/Patient Instructions:     Medications:  Reconciled Home Medications:   Current Discharge Medication List        CONTINUE these medications which have NOT CHANGED    Details   ibuprofen (ADVIL,MOTRIN) 800 MG tablet Take 1 tablet (800 mg total) by mouth 3 (three) times daily. TAKE WITH FOOD  Qty: 90 tablet, Refills: 0    Associated Diagnoses: Partial tear of common extensor tendon of left elbow; Lateral epicondylitis, left elbow      multivit-mins no.63/iron/folic (M-VIT ORAL) Take by mouth once daily.      omeprazole (PRILOSEC) 20 MG capsule Take 20 mg by mouth once daily. PRN GERD      albuterol (PROVENTIL HFA) 90 mcg/actuation inhaler Inhale 2 puffs into the lungs every 6 (six) hours as needed for Wheezing or Shortness of Breath. Rescue  Qty: 18 g, Refills: 1    Associated Diagnoses: Cough      lisinopriL (PRINIVIL,ZESTRIL) 40 MG tablet Take 1 tablet (40 mg total) by mouth once daily.  Qty: 90 tablet, Refills: 1    Comments: .  Associated Diagnoses: Essential hypertension           Discharge Procedure Orders   Diet Adult Regular     Leave dressing on - Keep it clean, dry, and intact until clinic visit     Activity as tolerated

## 2023-05-03 NOTE — ANESTHESIA POSTPROCEDURE EVALUATION
Anesthesia Post Evaluation    Patient: Augustine Boswell    Procedure(s) Performed: Procedure(s) (LRB):  TENOTOMY (Left)  RELEASE, ELBOW, LATERAL EPICONDYLE (Left)  REPAIR, TENDON, EXTENSOR (Left)    Final Anesthesia Type: general      Patient location during evaluation: PACU  Patient participation: Yes- Able to Participate  Level of consciousness: awake and alert, oriented and awake  Post-procedure vital signs: reviewed and stable  Pain management: adequate  Airway patency: patent    PONV status at discharge: No PONV  Anesthetic complications: no      Cardiovascular status: blood pressure returned to baseline, hemodynamically stable and stable  Respiratory status: unassisted, spontaneous ventilation and room air  Hydration status: euvolemic  Follow-up not needed.          Vitals Value Taken Time   /84 05/03/23 1501   Temp 36.4 °C (97.5 °F) 05/03/23 1345   Pulse 41 05/03/23 1510   Resp 17 05/03/23 1510   SpO2 99 % 05/03/23 1510   Vitals shown include unvalidated device data.      No case tracking events are documented in the log.      Pain/Jacqueline Score: Pain Rating Prior to Med Admin: 8 (5/3/2023  2:40 PM)  Jacqueline Score: 10 (5/3/2023  3:00 PM)

## 2023-05-03 NOTE — DISCHARGE INSTRUCTIONS
No driving for 24 hours.  Leave dressing in place until MD followup  Wear sling for comfort.  Ice and elevate arm on pillows as much as tolerated.

## 2023-05-03 NOTE — TRANSFER OF CARE
Anesthesia Transfer of Care Note    Patient: Augustine Boswell    Procedure(s) Performed: Procedure(s) (LRB):  TENOTOMY (Left)  RELEASE, ELBOW, LATERAL EPICONDYLE (Left)  REPAIR, TENDON, EXTENSOR (Left)    Patient location: PACU    Anesthesia Type: general    Transport from OR: Transported from OR on room air with adequate spontaneous ventilation    Post pain: adequate analgesia    Post assessment: no apparent anesthetic complications and tolerated procedure well    Post vital signs: stable    Level of consciousness: responds to stimulation    Nausea/Vomiting: no nausea/vomiting    Complications: none    Transfer of care protocol was followedComments: SV, exchanging well.  To PACU, VSS upon arrival. Report to RN       Last vitals:   Visit Vitals  /89   Pulse (!) 51   Temp 36.7 °C (98 °F) (Oral)   Resp 14   SpO2 100%

## 2023-05-04 ENCOUNTER — OFFICE VISIT (OUTPATIENT)
Dept: ORTHOPEDICS | Facility: CLINIC | Age: 48
End: 2023-05-04
Payer: MEDICAID

## 2023-05-04 VITALS — WEIGHT: 220 LBS | BODY MASS INDEX: 28.23 KG/M2 | HEIGHT: 74 IN

## 2023-05-04 DIAGNOSIS — Z98.890 H/O ELBOW SURGERY: Primary | ICD-10-CM

## 2023-05-04 PROCEDURE — 99024 PR POST-OP FOLLOW-UP VISIT: ICD-10-PCS | Mod: S$GLB,,, | Performed by: ORTHOPAEDIC SURGERY

## 2023-05-04 PROCEDURE — 3008F PR BODY MASS INDEX (BMI) DOCUMENTED: ICD-10-PCS | Mod: CPTII,S$GLB,, | Performed by: ORTHOPAEDIC SURGERY

## 2023-05-04 PROCEDURE — 3008F BODY MASS INDEX DOCD: CPT | Mod: CPTII,S$GLB,, | Performed by: ORTHOPAEDIC SURGERY

## 2023-05-04 PROCEDURE — 1159F MED LIST DOCD IN RCRD: CPT | Mod: CPTII,S$GLB,, | Performed by: ORTHOPAEDIC SURGERY

## 2023-05-04 PROCEDURE — 1160F PR REVIEW ALL MEDS BY PRESCRIBER/CLIN PHARMACIST DOCUMENTED: ICD-10-PCS | Mod: CPTII,S$GLB,, | Performed by: ORTHOPAEDIC SURGERY

## 2023-05-04 PROCEDURE — 4010F ACE/ARB THERAPY RXD/TAKEN: CPT | Mod: CPTII,S$GLB,, | Performed by: ORTHOPAEDIC SURGERY

## 2023-05-04 PROCEDURE — 1160F RVW MEDS BY RX/DR IN RCRD: CPT | Mod: CPTII,S$GLB,, | Performed by: ORTHOPAEDIC SURGERY

## 2023-05-04 PROCEDURE — 1159F PR MEDICATION LIST DOCUMENTED IN MEDICAL RECORD: ICD-10-PCS | Mod: CPTII,S$GLB,, | Performed by: ORTHOPAEDIC SURGERY

## 2023-05-04 PROCEDURE — 4010F PR ACE/ARB THEARPY RXD/TAKEN: ICD-10-PCS | Mod: CPTII,S$GLB,, | Performed by: ORTHOPAEDIC SURGERY

## 2023-05-04 PROCEDURE — 99024 POSTOP FOLLOW-UP VISIT: CPT | Mod: S$GLB,,, | Performed by: ORTHOPAEDIC SURGERY

## 2023-05-04 RX ORDER — NAPROXEN 500 MG/1
500 TABLET ORAL 2 TIMES DAILY
COMMUNITY
End: 2023-10-03 | Stop reason: SDUPTHER
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
## 2023-05-23 ENCOUNTER — OFFICE VISIT (OUTPATIENT)
Dept: ORTHOPEDICS | Facility: CLINIC | Age: 48
End: 2023-05-23
Payer: MEDICAID

## 2023-05-23 VITALS
HEIGHT: 74 IN | DIASTOLIC BLOOD PRESSURE: 84 MMHG | SYSTOLIC BLOOD PRESSURE: 120 MMHG | BODY MASS INDEX: 28.23 KG/M2 | WEIGHT: 220 LBS

## 2023-05-23 DIAGNOSIS — Z98.890 H/O ELBOW SURGERY: Primary | ICD-10-CM

## 2023-05-23 PROCEDURE — 1160F PR REVIEW ALL MEDS BY PRESCRIBER/CLIN PHARMACIST DOCUMENTED: ICD-10-PCS | Mod: CPTII,S$GLB,, | Performed by: ORTHOPAEDIC SURGERY

## 2023-05-23 PROCEDURE — 3074F SYST BP LT 130 MM HG: CPT | Mod: CPTII,S$GLB,, | Performed by: ORTHOPAEDIC SURGERY

## 2023-05-23 PROCEDURE — 4010F ACE/ARB THERAPY RXD/TAKEN: CPT | Mod: CPTII,S$GLB,, | Performed by: ORTHOPAEDIC SURGERY

## 2023-05-23 PROCEDURE — 1159F MED LIST DOCD IN RCRD: CPT | Mod: CPTII,S$GLB,, | Performed by: ORTHOPAEDIC SURGERY

## 2023-05-23 PROCEDURE — 99024 POSTOP FOLLOW-UP VISIT: CPT | Mod: S$GLB,,, | Performed by: ORTHOPAEDIC SURGERY

## 2023-05-23 PROCEDURE — 3079F DIAST BP 80-89 MM HG: CPT | Mod: CPTII,S$GLB,, | Performed by: ORTHOPAEDIC SURGERY

## 2023-05-23 PROCEDURE — 3074F PR MOST RECENT SYSTOLIC BLOOD PRESSURE < 130 MM HG: ICD-10-PCS | Mod: CPTII,S$GLB,, | Performed by: ORTHOPAEDIC SURGERY

## 2023-05-23 PROCEDURE — 1159F PR MEDICATION LIST DOCUMENTED IN MEDICAL RECORD: ICD-10-PCS | Mod: CPTII,S$GLB,, | Performed by: ORTHOPAEDIC SURGERY

## 2023-05-23 PROCEDURE — 3008F PR BODY MASS INDEX (BMI) DOCUMENTED: ICD-10-PCS | Mod: CPTII,S$GLB,, | Performed by: ORTHOPAEDIC SURGERY

## 2023-05-23 PROCEDURE — 3008F BODY MASS INDEX DOCD: CPT | Mod: CPTII,S$GLB,, | Performed by: ORTHOPAEDIC SURGERY

## 2023-05-23 PROCEDURE — 1160F RVW MEDS BY RX/DR IN RCRD: CPT | Mod: CPTII,S$GLB,, | Performed by: ORTHOPAEDIC SURGERY

## 2023-05-23 PROCEDURE — 3079F PR MOST RECENT DIASTOLIC BLOOD PRESSURE 80-89 MM HG: ICD-10-PCS | Mod: CPTII,S$GLB,, | Performed by: ORTHOPAEDIC SURGERY

## 2023-05-23 PROCEDURE — 4010F PR ACE/ARB THEARPY RXD/TAKEN: ICD-10-PCS | Mod: CPTII,S$GLB,, | Performed by: ORTHOPAEDIC SURGERY

## 2023-05-23 PROCEDURE — 99024 PR POST-OP FOLLOW-UP VISIT: ICD-10-PCS | Mod: S$GLB,,, | Performed by: ORTHOPAEDIC SURGERY
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
## 2023-05-25 DIAGNOSIS — I10 ESSENTIAL HYPERTENSION: ICD-10-CM

## 2023-05-25 RX ORDER — LISINOPRIL 40 MG/1
40 TABLET ORAL DAILY
Qty: 90 TABLET | Refills: 1 | Status: SHIPPED | OUTPATIENT
Start: 2023-05-25 | End: 2023-10-02 | Stop reason: CLARIF

## 2023-06-05 ENCOUNTER — OFFICE VISIT (OUTPATIENT)
Dept: URGENT CARE | Facility: CLINIC | Age: 48
End: 2023-06-05
Payer: MEDICAID

## 2023-06-05 VITALS
TEMPERATURE: 98 F | DIASTOLIC BLOOD PRESSURE: 78 MMHG | HEART RATE: 65 BPM | OXYGEN SATURATION: 96 % | SYSTOLIC BLOOD PRESSURE: 128 MMHG | RESPIRATION RATE: 16 BRPM

## 2023-06-05 DIAGNOSIS — U07.1 COVID-19: Primary | ICD-10-CM

## 2023-06-05 DIAGNOSIS — R09.81 SINUS CONGESTION: ICD-10-CM

## 2023-06-05 LAB
CTP QC/QA: YES
CTP QC/QA: YES
POC MOLECULAR INFLUENZA A AGN: NEGATIVE
POC MOLECULAR INFLUENZA B AGN: NEGATIVE
SARS-COV-2 AG RESP QL IA.RAPID: POSITIVE

## 2023-06-05 PROCEDURE — 87502 POCT INFLUENZA A/B MOLECULAR: ICD-10-PCS | Mod: QW,S$GLB,, | Performed by: PHYSICIAN ASSISTANT

## 2023-06-05 PROCEDURE — 87811 SARS CORONAVIRUS 2 ANTIGEN POCT, MANUAL READ: ICD-10-PCS | Mod: QW,S$GLB,, | Performed by: PHYSICIAN ASSISTANT

## 2023-06-05 PROCEDURE — 87811 SARS-COV-2 COVID19 W/OPTIC: CPT | Mod: QW,S$GLB,, | Performed by: PHYSICIAN ASSISTANT

## 2023-06-05 PROCEDURE — 99213 PR OFFICE/OUTPT VISIT, EST, LEVL III, 20-29 MIN: ICD-10-PCS | Mod: S$GLB,,, | Performed by: PHYSICIAN ASSISTANT

## 2023-06-05 PROCEDURE — 99213 OFFICE O/P EST LOW 20 MIN: CPT | Mod: S$GLB,,, | Performed by: PHYSICIAN ASSISTANT

## 2023-06-05 PROCEDURE — 87502 INFLUENZA DNA AMP PROBE: CPT | Mod: QW,S$GLB,, | Performed by: PHYSICIAN ASSISTANT

## 2023-06-05 NOTE — PROGRESS NOTES
Subjective:      Patient ID: Augustine Boswell is a 48 y.o. male.    Vitals:  temperature is 97.8 °F (36.6 °C). His blood pressure is 128/78 and his pulse is 65. His respiration is 16 and oxygen saturation is 96%.     Chief Complaint: Sinus Problem    Pt presents with sinus matias, sore throat, PND, headache and body ache, eyes and neck hurts, cough x last night   Pt request covid and flu    Sinus Problem  This is a new problem. The current episode started yesterday. The problem has been gradually worsening since onset. There has been no fever. His pain is at a severity of 7/10. The pain is moderate. Associated symptoms include chills, congestion, coughing, headaches, sinus pressure and a sore throat. Pertinent negatives include no shortness of breath. Treatments tried: benadryl, ibuprofen, BC. The treatment provided moderate relief.     Constitution: Positive for chills and fever.   HENT:  Positive for congestion, sinus pressure and sore throat.    Respiratory:  Positive for cough. Negative for shortness of breath.    Neurological:  Positive for headaches.    Objective:     Physical Exam   Constitutional: He does not appear ill. No distress.   HENT:   Head: Normocephalic and atraumatic.   Ears:   Right Ear: External ear normal.   Left Ear: External ear normal.   Mouth/Throat: Mucous membranes are moist. No oropharyngeal exudate or posterior oropharyngeal erythema. Oropharynx is clear.   Eyes: Conjunctivae are normal. Right eye exhibits no discharge. Left eye exhibits no discharge. Extraocular movement intact   Cardiovascular: Normal rate, regular rhythm and normal heart sounds.   No murmur heard.  Pulmonary/Chest: Effort normal and breath sounds normal. He has no wheezes. He has no rhonchi. He has no rales.   Abdominal: Normal appearance.   Musculoskeletal: Normal range of motion.         General: Normal range of motion.   Neurological: He is alert.   Skin: Skin is warm, dry and not pale. jaundice  Psychiatric: His  behavior is normal. Mood, judgment and thought content normal.   Nursing note and vitals reviewed.    Assessment:     1. COVID-19    2. Sinus congestion        Plan:       COVID-19    Sinus congestion  -     SARS Coronavirus 2 Antigen, POCT Manual Read  -     POCT Influenza A/B MOLECULAR    Results for orders placed or performed in visit on 06/05/23   SARS Coronavirus 2 Antigen, POCT Manual Read   Result Value Ref Range    SARS Coronavirus 2 Antigen Positive (A) Negative     Acceptable Yes    POCT Influenza A/B MOLECULAR   Result Value Ref Range    POC Molecular Influenza A Ag Negative Negative, Not Reported    POC Molecular Influenza B Ag Negative Negative, Not Reported     Acceptable Yes         Other orders  -     nirmatrelvir-ritonavir 300 mg (150 mg x 2)-100 mg copackaged tablets (EUA); Take 3 tablets by mouth 2 (two) times daily for 5 days. Each dose contains 2 nirmatrelvir (pink tablets) and 1 ritonavir (white tablet). Take all 3 tablets together  Dispense: 30 tablet; Refill: 0    Patient Instructions   Your test was POSITIVE for COVID-19 (coronavirus).       Please isolate yourself at home.  You may leave home and/or return to work once the following conditions are met:    If you were not hospitalized and are not moderately to severely immunocompromised:   More than 5 days since symptoms first appeared AND  More than 24 hours fever free without medications AND  Symptoms are improving  Continue to wear a mask around others for 5 additional days.    If you were hospitalized OR are moderately to severely immunocompromised:  More than 20 days since symptoms first appeared  More than 24 hours fever free without medications  Symptoms have improved    If you had no symptoms but tested positive:  More than 5 days since the date of the first positive test (20 days if moderately to severely immunocompromised). If you develop symptoms, then use the guidelines above.  Continue to wear a  mask around others for 5 additional days.      Contact Tracing    As one of the next steps, you will receive a call or text from the [place] Department of Health ([facility]) COVID-19 Tracing Team. See the contact information below so you know not to ignore the health departments call. It is important that you contact them back immediately so they can help.      Contact Tracer Number:  [phone]  Caller ID for most carriers: [facility]     What is contact tracing?  Contact tracing is a process that helps identify everyone who has been in close contact with an infected person. Contact tracers let those people know they may have been exposed and guide them on next steps. Confidentiality is important for everyone; no one will be told who may have exposed them to the virus.  Your involvement is important. The more we know about where and how this virus is spreading, the better chance we have at stopping it from spreading further.  What does exposure mean?  Exposure means you have been within 6 feet for more than 15 minutes with a person who has or had COVID-19.  What kind of questions do the contact tracers ask?  A contact tracer will confirm your basic contact information including name, address, phone number, and next of kin, as well as asking about any symptoms you may have had. Theyll also ask you how you think you may have gotten sick, such as places where you may have been exposed to the virus, and people you were with. Those names will never be shared with anyone outside of that call, and will only be used to help trace and stop the spread of the virus.   I have privacy concerns. How will the state use my information?  Your privacy about your health is important. All calls are completed using call centers that use the appropriate health privacy protection measures (HIPAA compliance), meaning that your patient information is safe. No one will ever ask you any questions related to immigration status. Your  health comes first.   Do I have to participate?  You do not have to participate, but we strongly encourage you to. Contact tracing can help us catch and control new outbreaks as theyre developing to keep your friends and family safe.   What if I dont hear from anyone?  If you dont receive a call within 24 hours, you can call the number above right away to inquire about your status. That line is open from 8:00 am - 8:00 p.m., 7 days a week.  Contact tracing saves lives! Together, we have the power to beat this virus and keep our loved ones and neighbors safe.    For more information see CDC link below.      https://www.cdc.gov/coronavirus/2019-ncov/hcp/guidance-prevent-spread.html#precautions        Sources:  [facility], [facility] and [facility]           Sincerely,     DAISY Cat

## 2023-06-05 NOTE — PATIENT INSTRUCTIONS
Your test was POSITIVE for COVID-19 (coronavirus).       Please isolate yourself at home.  You may leave home and/or return to work once the following conditions are met:    If you were not hospitalized and are not moderately to severely immunocompromised:   More than 5 days since symptoms first appeared AND  More than 24 hours fever free without medications AND  Symptoms are improving  Continue to wear a mask around others for 5 additional days.    If you were hospitalized OR are moderately to severely immunocompromised:  More than 20 days since symptoms first appeared  More than 24 hours fever free without medications  Symptoms have improved    If you had no symptoms but tested positive:  More than 5 days since the date of the first positive test (20 days if moderately to severely immunocompromised). If you develop symptoms, then use the guidelines above.  Continue to wear a mask around others for 5 additional days.      Contact Tracing    As one of the next steps, you will receive a call or text from the [place] [facility] ([facility]) COVID-19 Tracing Team. See the contact information below so you know not to ignore the health departments call. It is important that you contact them back immediately so they can help.      Contact Tracer Number:  [phone]  Caller ID for most carriers: [facility]     What is contact tracing?  Contact tracing is a process that helps identify everyone who has been in close contact with an infected person. Contact tracers let those people know they may have been exposed and guide them on next steps. Confidentiality is important for everyone; no one will be told who may have exposed them to the virus.  Your involvement is important. The more we know about where and how this virus is spreading, the better chance we have at stopping it from spreading further.  What does exposure mean?  Exposure means you have been within 6 feet for more than 15 minutes with a person who  has or had COVID-19.  What kind of questions do the contact tracers ask?  A contact tracer will confirm your basic contact information including name, address, phone number, and next of kin, as well as asking about any symptoms you may have had. Theyll also ask you how you think you may have gotten sick, such as places where you may have been exposed to the virus, and people you were with. Those names will never be shared with anyone outside of that call, and will only be used to help trace and stop the spread of the virus.   I have privacy concerns. How will the state use my information?  Your privacy about your health is important. All calls are completed using call centers that use the appropriate health privacy protection measures (HIPAA compliance), meaning that your patient information is safe. No one will ever ask you any questions related to immigration status. Your health comes first.   Do I have to participate?  You do not have to participate, but we strongly encourage you to. Contact tracing can help us catch and control new outbreaks as theyre developing to keep your friends and family safe.   What if I dont hear from anyone?  If you dont receive a call within 24 hours, you can call the number above right away to inquire about your status. That line is open from 8:00 am - 8:00 p.m., 7 days a week.  Contact tracing saves lives! Together, we have the power to beat this virus and keep our loved ones and neighbors safe.    For more information see CDC link below.      https://www.cdc.gov/coronavirus/2019-ncov/hcp/guidance-prevent-spread.html#precautions        Sources:  [facility], [place] [facility] and [facility]           Sincerely,     DASIY Cat

## 2023-06-13 ENCOUNTER — OFFICE VISIT (OUTPATIENT)
Dept: ORTHOPEDICS | Facility: CLINIC | Age: 48
End: 2023-06-13
Payer: MEDICAID

## 2023-06-13 VITALS
SYSTOLIC BLOOD PRESSURE: 148 MMHG | BODY MASS INDEX: 28.23 KG/M2 | HEIGHT: 74 IN | WEIGHT: 220 LBS | DIASTOLIC BLOOD PRESSURE: 72 MMHG

## 2023-06-13 DIAGNOSIS — Z98.890 H/O ELBOW SURGERY: Primary | ICD-10-CM

## 2023-06-13 PROCEDURE — 1160F PR REVIEW ALL MEDS BY PRESCRIBER/CLIN PHARMACIST DOCUMENTED: ICD-10-PCS | Mod: CPTII,S$GLB,, | Performed by: ORTHOPAEDIC SURGERY

## 2023-06-13 PROCEDURE — 3078F DIAST BP <80 MM HG: CPT | Mod: CPTII,S$GLB,, | Performed by: ORTHOPAEDIC SURGERY

## 2023-06-13 PROCEDURE — 4010F ACE/ARB THERAPY RXD/TAKEN: CPT | Mod: CPTII,S$GLB,, | Performed by: ORTHOPAEDIC SURGERY

## 2023-06-13 PROCEDURE — 3078F PR MOST RECENT DIASTOLIC BLOOD PRESSURE < 80 MM HG: ICD-10-PCS | Mod: CPTII,S$GLB,, | Performed by: ORTHOPAEDIC SURGERY

## 2023-06-13 PROCEDURE — 3077F SYST BP >= 140 MM HG: CPT | Mod: CPTII,S$GLB,, | Performed by: ORTHOPAEDIC SURGERY

## 2023-06-13 PROCEDURE — 4010F PR ACE/ARB THEARPY RXD/TAKEN: ICD-10-PCS | Mod: CPTII,S$GLB,, | Performed by: ORTHOPAEDIC SURGERY

## 2023-06-13 PROCEDURE — 1159F PR MEDICATION LIST DOCUMENTED IN MEDICAL RECORD: ICD-10-PCS | Mod: CPTII,S$GLB,, | Performed by: ORTHOPAEDIC SURGERY

## 2023-06-13 PROCEDURE — 99024 POSTOP FOLLOW-UP VISIT: CPT | Mod: S$GLB,,, | Performed by: ORTHOPAEDIC SURGERY

## 2023-06-13 PROCEDURE — 1159F MED LIST DOCD IN RCRD: CPT | Mod: CPTII,S$GLB,, | Performed by: ORTHOPAEDIC SURGERY

## 2023-06-13 PROCEDURE — 99024 PR POST-OP FOLLOW-UP VISIT: ICD-10-PCS | Mod: S$GLB,,, | Performed by: ORTHOPAEDIC SURGERY

## 2023-06-13 PROCEDURE — 3008F PR BODY MASS INDEX (BMI) DOCUMENTED: ICD-10-PCS | Mod: CPTII,S$GLB,, | Performed by: ORTHOPAEDIC SURGERY

## 2023-06-13 PROCEDURE — 3008F BODY MASS INDEX DOCD: CPT | Mod: CPTII,S$GLB,, | Performed by: ORTHOPAEDIC SURGERY

## 2023-06-13 PROCEDURE — 3077F PR MOST RECENT SYSTOLIC BLOOD PRESSURE >= 140 MM HG: ICD-10-PCS | Mod: CPTII,S$GLB,, | Performed by: ORTHOPAEDIC SURGERY

## 2023-06-13 PROCEDURE — 1160F RVW MEDS BY RX/DR IN RCRD: CPT | Mod: CPTII,S$GLB,, | Performed by: ORTHOPAEDIC SURGERY
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
## 2023-06-26 ENCOUNTER — PATIENT MESSAGE (OUTPATIENT)
Dept: FAMILY MEDICINE | Facility: CLINIC | Age: 48
End: 2023-06-26

## 2023-06-26 DIAGNOSIS — M77.11 LATERAL EPICONDYLITIS, RIGHT ELBOW: ICD-10-CM

## 2023-06-26 DIAGNOSIS — M25.521 RIGHT ELBOW PAIN: Primary | ICD-10-CM

## 2023-07-25 ENCOUNTER — OFFICE VISIT (OUTPATIENT)
Dept: ORTHOPEDICS | Facility: CLINIC | Age: 48
End: 2023-07-25
Payer: MEDICAID

## 2023-07-25 VITALS
WEIGHT: 220 LBS | DIASTOLIC BLOOD PRESSURE: 78 MMHG | SYSTOLIC BLOOD PRESSURE: 130 MMHG | BODY MASS INDEX: 28.23 KG/M2 | HEIGHT: 74 IN

## 2023-07-25 DIAGNOSIS — M77.01 MEDIAL EPICONDYLITIS, RIGHT: ICD-10-CM

## 2023-07-25 DIAGNOSIS — M77.11 LATERAL EPICONDYLITIS, RIGHT ELBOW: Primary | ICD-10-CM

## 2023-07-25 PROCEDURE — 3075F SYST BP GE 130 - 139MM HG: CPT | Mod: CPTII,S$GLB,, | Performed by: ORTHOPAEDIC SURGERY

## 2023-07-25 PROCEDURE — 3008F PR BODY MASS INDEX (BMI) DOCUMENTED: ICD-10-PCS | Mod: CPTII,S$GLB,, | Performed by: ORTHOPAEDIC SURGERY

## 2023-07-25 PROCEDURE — 1160F RVW MEDS BY RX/DR IN RCRD: CPT | Mod: CPTII,S$GLB,, | Performed by: ORTHOPAEDIC SURGERY

## 2023-07-25 PROCEDURE — 3078F PR MOST RECENT DIASTOLIC BLOOD PRESSURE < 80 MM HG: ICD-10-PCS | Mod: CPTII,S$GLB,, | Performed by: ORTHOPAEDIC SURGERY

## 2023-07-25 PROCEDURE — 20550 NJX 1 TENDON SHEATH/LIGAMENT: CPT | Mod: 79,RT,S$GLB, | Performed by: ORTHOPAEDIC SURGERY

## 2023-07-25 PROCEDURE — 1159F PR MEDICATION LIST DOCUMENTED IN MEDICAL RECORD: ICD-10-PCS | Mod: CPTII,S$GLB,, | Performed by: ORTHOPAEDIC SURGERY

## 2023-07-25 PROCEDURE — 3075F PR MOST RECENT SYSTOLIC BLOOD PRESS GE 130-139MM HG: ICD-10-PCS | Mod: CPTII,S$GLB,, | Performed by: ORTHOPAEDIC SURGERY

## 2023-07-25 PROCEDURE — 4010F PR ACE/ARB THEARPY RXD/TAKEN: ICD-10-PCS | Mod: CPTII,S$GLB,, | Performed by: ORTHOPAEDIC SURGERY

## 2023-07-25 PROCEDURE — 99213 OFFICE O/P EST LOW 20 MIN: CPT | Mod: 25,24,S$GLB, | Performed by: ORTHOPAEDIC SURGERY

## 2023-07-25 PROCEDURE — 1160F PR REVIEW ALL MEDS BY PRESCRIBER/CLIN PHARMACIST DOCUMENTED: ICD-10-PCS | Mod: CPTII,S$GLB,, | Performed by: ORTHOPAEDIC SURGERY

## 2023-07-25 PROCEDURE — 3078F DIAST BP <80 MM HG: CPT | Mod: CPTII,S$GLB,, | Performed by: ORTHOPAEDIC SURGERY

## 2023-07-25 PROCEDURE — 4010F ACE/ARB THERAPY RXD/TAKEN: CPT | Mod: CPTII,S$GLB,, | Performed by: ORTHOPAEDIC SURGERY

## 2023-07-25 PROCEDURE — 3008F BODY MASS INDEX DOCD: CPT | Mod: CPTII,S$GLB,, | Performed by: ORTHOPAEDIC SURGERY

## 2023-07-25 PROCEDURE — 99213 PR OFFICE/OUTPT VISIT, EST, LEVL III, 20-29 MIN: ICD-10-PCS | Mod: 25,24,S$GLB, | Performed by: ORTHOPAEDIC SURGERY

## 2023-07-25 PROCEDURE — 1159F MED LIST DOCD IN RCRD: CPT | Mod: CPTII,S$GLB,, | Performed by: ORTHOPAEDIC SURGERY

## 2023-07-25 PROCEDURE — 20550 PR INJECT TENDON SHEATH/LIGAMENT: ICD-10-PCS | Mod: 79,RT,S$GLB, | Performed by: ORTHOPAEDIC SURGERY

## 2023-07-25 RX ORDER — METHYLPREDNISOLONE ACETATE 40 MG/ML
40 INJECTION, SUSPENSION INTRA-ARTICULAR; INTRALESIONAL; INTRAMUSCULAR; SOFT TISSUE
Status: DISCONTINUED | OUTPATIENT
Start: 2023-07-25 | End: 2023-07-25 | Stop reason: HOSPADM

## 2023-07-25 RX ADMIN — METHYLPREDNISOLONE ACETATE 40 MG: 40 INJECTION, SUSPENSION INTRA-ARTICULAR; INTRALESIONAL; INTRAMUSCULAR; SOFT TISSUE at 07:07

## 2023-07-25 NOTE — PROCEDURES
Tendon Sheath    Date/Time: 7/25/2023 7:30 AM  Performed by: Gian Gonsalves MD  Authorized by: Gian Gonsalves MD     Consent Done?:  Yes (Verbal)  Indications:  Pain  Site marked: the procedure site was marked    Timeout: prior to procedure the correct patient, procedure, and site was verified    Prep: patient was prepped and draped in usual sterile fashion      Local anesthesia used?: Yes    Anesthesia:  Local infiltration  Local anesthetic:  Lidocaine 1% without epinephrine  Location: RT medial epicondyle.  Ultrasonic guidance for needle placement?: No    Needle size:  25 G  Medications:  40 mg methylPREDNISolone acetate 40 mg/mL  Patient tolerance:  Patient tolerated the procedure well with no immediate complications
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
## 2023-08-01 ENCOUNTER — CLINICAL SUPPORT (OUTPATIENT)
Dept: REHABILITATION | Facility: HOSPITAL | Age: 48
End: 2023-08-01
Payer: MEDICAID

## 2023-08-01 DIAGNOSIS — R29.898 DECREASED GRIP STRENGTH OF RIGHT HAND: ICD-10-CM

## 2023-08-01 DIAGNOSIS — Z78.9 ALTERATION IN INSTRUMENTAL ACTIVITIES OF DAILY LIVING (IADL): ICD-10-CM

## 2023-08-01 DIAGNOSIS — M77.01 MEDIAL EPICONDYLITIS, RIGHT: ICD-10-CM

## 2023-08-01 DIAGNOSIS — M25.521 RIGHT ELBOW PAIN: Primary | ICD-10-CM

## 2023-08-01 PROCEDURE — 97165 OT EVAL LOW COMPLEX 30 MIN: CPT | Mod: PO

## 2023-08-01 PROCEDURE — 97530 THERAPEUTIC ACTIVITIES: CPT | Mod: PO
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
## 2023-08-08 ENCOUNTER — CLINICAL SUPPORT (OUTPATIENT)
Dept: REHABILITATION | Facility: HOSPITAL | Age: 48
End: 2023-08-08
Payer: MEDICAID

## 2023-08-08 DIAGNOSIS — M25.521 RIGHT ELBOW PAIN: Primary | ICD-10-CM

## 2023-08-08 DIAGNOSIS — Z78.9 ALTERATION IN INSTRUMENTAL ACTIVITIES OF DAILY LIVING (IADL): ICD-10-CM

## 2023-08-08 DIAGNOSIS — R29.898 DECREASED GRIP STRENGTH OF RIGHT HAND: ICD-10-CM

## 2023-08-08 PROCEDURE — 97530 THERAPEUTIC ACTIVITIES: CPT | Mod: PO

## 2023-08-08 NOTE — PROGRESS NOTES
[facility]  Occupational Therapy Treatment Note     Date: 8/8/2023  Name: Augustine Boswell  Clinic Number: [id number]    Therapy Diagnosis:      R elbow pain, decreased  /IADL  Physician: Gian Gonsalves MD     Physician Orders: Eval and treat, 1-2 times a week for 4 weeks, teach home program   Medical Diagnosis: M77.01 (ICD-10-CM) - Medial epicondylitis, right     Evaluation Date: 8/1/2023  Insurance Authorization Period Expiration: 10/10/2023  Plan of Care Certification Period: 10/30/23/23  Date of Return to MD: 9/5/23  Visit # / Visits authorized: 2 / 12  FOTO: Initial/58     Time In:0803  Time Out: 0845  Total Appointment Time (timed & untimed codes): 42 minutes     Surgical Procedure:   N/a      Precautions: Standard      Date of Onset: approx 8/2022                                  S/P: chronic     Subjective     Pt reports: no change in symptoms. Pt reports his HEP papers got wet and he could not remember the nerve gliding exercise. m  He was  compliant with home exercise program given last session.   Response to previous treatment:Good   Functional change: none reported     Pain:  Functional Pain Scale Rating 0-10:   2/10 now  2/10 at best  7-8/10 at worst  Location: R medial epi and radiates proximally      Objective     Objective Measures updated at progress report unless specified.    Not measured this date   Stress position wrist flexion   Left 69   Right 69        Stress Position  Strength (in pounds):  Setting II    Left Right   Elbow flexed Point of pain   95   Elbow flexed Max       NT due to sx 111   Elbow extended pronated Point of pain          65   Elbow extended pronated Max          NT due to sx 107                       Treatment     Betito received the treatments listed below:      Beitto received therapeutic activities to increase ROM, endurance, and/or strength for 42  minutes including:  -Ultrasound:  1 Mhz, 50 % duty cycle, 1.0 w/cm2 for 8 minutes at R medial  elbow to decrease pain and improve circulation.   -STM to R medial elbow and FA musculature x 10 min   -extrinsic flexor stretches: supinated, pronated, and over chair back  -bicep stretch over chair back (supinated and pronated)   -tricep stretch  -ulnar nerve glide 7 reps   -wrist flexion eccentrics with 3# weight 2x10     Patient Education and Home Exercises     Education provided:   - continue same      Written Home Exercises Provided: Patient instructed to cont prior HEP.  Exercises were reviewed and Betito was able to demonstrate them prior to the end of the session.  Betito demonstrated good  understanding of the HEP provided. See EMR under Patient Instructions for exercises provided during therapy sessions.       Assessment     Pt would continue to benefit from skilled OT. Re-issued written copies of all home exercises. Reviewed performance of ulnar nerve glide. Initiated US and STM. Pt performed eccentric exercises without complaints of pain.  Pt tolerated session well.     Betito is progressing well towards his goals and there are no updates to goals at this time.   - Progress towards goals: Good; STG 1 met     Pt prognosis is Good.    Pt will continue to benefit from skilled outpatient occupational therapy to address the deficits listed in the problem list on initial evaluation provide pt/family education and to maximize pt's level of independence in the home and community environment.     Pt's spiritual, cultural and educational needs considered and pt agreeable to plan of care and goals.    Anticipated barriers to occupational therapy: none     Goals:  Short Term Goals: (4 weeks)  1. Pt will be independent with HEP in 2 visits. (Met 8/8/23)  2. Pt will report decreased pain to a 5-6/10 at worst.  3. Pt will progress to extrinsic stretches with elbow extended in 5 visits.    4. Pt will be I with lifting modifications to reduce risk of recurrence.   5. Pt will increase point of pain  testing on R with elbow  extended to 75#.      Long Term Goals: (by discharge)  1. Pt will report decreased pain to 1-2/10 with ADLs.   2. Pt will exhibit a significant increase (7+ points) in the FOTO assessment.  3. Pt will be independent with self management of future exacerbations.   4.Pt will increase point of pain  testing on R with elbow extended to 85#.   5. Pt will return to PLOF.       Plan     Certification Period/Plan of care expiration: 8/1/2023 to 10/30/23.     Continue Occupational Therapy 1-2 times per week x 4 weeks to decrease pain and edema, and increase A/PROM, strength, and functional use of R upper extremity.     Updates/Grading for next session: progress as tolerated.    NISA Wlyie, MJT

## 2023-08-11 ENCOUNTER — OFFICE VISIT (OUTPATIENT)
Dept: FAMILY MEDICINE | Facility: CLINIC | Age: 48
End: 2023-08-11
Payer: MEDICAID

## 2023-08-11 ENCOUNTER — PATIENT MESSAGE (OUTPATIENT)
Dept: FAMILY MEDICINE | Facility: CLINIC | Age: 48
End: 2023-08-11

## 2023-08-11 ENCOUNTER — TELEPHONE (OUTPATIENT)
Dept: FAMILY MEDICINE | Facility: CLINIC | Age: 48
End: 2023-08-11

## 2023-08-11 ENCOUNTER — LAB VISIT (OUTPATIENT)
Dept: LAB | Facility: HOSPITAL | Age: 48
End: 2023-08-11
Attending: NURSE PRACTITIONER
Payer: MEDICAID

## 2023-08-11 VITALS
HEIGHT: 74 IN | WEIGHT: 226 LBS | HEART RATE: 54 BPM | TEMPERATURE: 98 F | OXYGEN SATURATION: 98 % | DIASTOLIC BLOOD PRESSURE: 84 MMHG | BODY MASS INDEX: 29 KG/M2 | SYSTOLIC BLOOD PRESSURE: 132 MMHG

## 2023-08-11 DIAGNOSIS — M77.11 LATERAL EPICONDYLITIS, RIGHT ELBOW: ICD-10-CM

## 2023-08-11 DIAGNOSIS — I10 ESSENTIAL HYPERTENSION: Primary | ICD-10-CM

## 2023-08-11 DIAGNOSIS — R53.83 FATIGUE, UNSPECIFIED TYPE: ICD-10-CM

## 2023-08-11 DIAGNOSIS — Z12.11 SCREENING FOR COLON CANCER: Primary | ICD-10-CM

## 2023-08-11 DIAGNOSIS — Z12.11 SCREENING FOR COLON CANCER: ICD-10-CM

## 2023-08-11 DIAGNOSIS — I10 ESSENTIAL HYPERTENSION: ICD-10-CM

## 2023-08-11 PROBLEM — R03.0 ELEVATED BP WITHOUT DIAGNOSIS OF HYPERTENSION: Status: RESOLVED | Noted: 2020-10-13 | Resolved: 2023-08-11

## 2023-08-11 LAB
ALBUMIN SERPL BCP-MCNC: 4.7 G/DL (ref 3.5–5.2)
ALBUMIN/CREAT UR: NORMAL UG/MG (ref 0–30)
ALP SERPL-CCNC: 58 U/L (ref 55–135)
ALT SERPL W/O P-5'-P-CCNC: 23 U/L (ref 10–44)
ANION GAP SERPL CALC-SCNC: 9 MMOL/L (ref 8–16)
AST SERPL-CCNC: 21 U/L (ref 10–40)
BASOPHILS # BLD AUTO: 0.05 K/UL (ref 0–0.2)
BASOPHILS NFR BLD: 1.1 % (ref 0–1.9)
BILIRUB SERPL-MCNC: 0.8 MG/DL (ref 0.1–1)
BILIRUB UR QL STRIP: NEGATIVE
BUN SERPL-MCNC: 16 MG/DL (ref 6–20)
CALCIUM SERPL-MCNC: 9.5 MG/DL (ref 8.7–10.5)
CHLORIDE SERPL-SCNC: 105 MMOL/L (ref 95–110)
CHOLEST SERPL-MCNC: 157 MG/DL (ref 120–199)
CHOLEST/HDLC SERPL: 3.4 {RATIO} (ref 2–5)
CLARITY UR: CLEAR
CO2 SERPL-SCNC: 23 MMOL/L (ref 23–29)
COLOR UR: YELLOW
CREAT SERPL-MCNC: 1.2 MG/DL (ref 0.5–1.4)
CREAT UR-MCNC: 57 MG/DL (ref 23–375)
DIFFERENTIAL METHOD: ABNORMAL
EOSINOPHIL # BLD AUTO: 0.1 K/UL (ref 0–0.5)
EOSINOPHIL NFR BLD: 2.2 % (ref 0–8)
ERYTHROCYTE [DISTWIDTH] IN BLOOD BY AUTOMATED COUNT: 12.1 % (ref 11.5–14.5)
EST. GFR  (NO RACE VARIABLE): >60 ML/MIN/1.73 M^2
GLUCOSE SERPL-MCNC: 78 MG/DL (ref 70–110)
GLUCOSE UR QL STRIP: NEGATIVE
HCT VFR BLD AUTO: 47.7 % (ref 40–54)
HDLC SERPL-MCNC: 46 MG/DL (ref 40–75)
HDLC SERPL: 29.3 % (ref 20–50)
HGB BLD-MCNC: 16.4 G/DL (ref 14–18)
HGB UR QL STRIP: NEGATIVE
IMM GRANULOCYTES # BLD AUTO: 0.01 K/UL (ref 0–0.04)
IMM GRANULOCYTES NFR BLD AUTO: 0.2 % (ref 0–0.5)
KETONES UR QL STRIP: NEGATIVE
LDLC SERPL CALC-MCNC: 99.4 MG/DL (ref 63–159)
LEUKOCYTE ESTERASE UR QL STRIP: NEGATIVE
LYMPHOCYTES # BLD AUTO: 1.2 K/UL (ref 1–4.8)
LYMPHOCYTES NFR BLD: 25.8 % (ref 18–48)
MCH RBC QN AUTO: 31.5 PG (ref 27–31)
MCHC RBC AUTO-ENTMCNC: 34.4 G/DL (ref 32–36)
MCV RBC AUTO: 92 FL (ref 82–98)
MICROALBUMIN UR DL<=1MG/L-MCNC: <2 UG/ML
MONOCYTES # BLD AUTO: 0.4 K/UL (ref 0.3–1)
MONOCYTES NFR BLD: 9.7 % (ref 4–15)
NEUTROPHILS # BLD AUTO: 2.8 K/UL (ref 1.8–7.7)
NEUTROPHILS NFR BLD: 61 % (ref 38–73)
NITRITE UR QL STRIP: NEGATIVE
NONHDLC SERPL-MCNC: 111 MG/DL
NRBC BLD-RTO: 0 /100 WBC
PH UR STRIP: 7 [PH] (ref 5–8)
PLATELET # BLD AUTO: 184 K/UL (ref 150–450)
PMV BLD AUTO: 12 FL (ref 9.2–12.9)
POTASSIUM SERPL-SCNC: 4 MMOL/L (ref 3.5–5.1)
PROT SERPL-MCNC: 7.6 G/DL (ref 6–8.4)
PROT UR QL STRIP: NEGATIVE
RBC # BLD AUTO: 5.21 M/UL (ref 4.6–6.2)
SODIUM SERPL-SCNC: 137 MMOL/L (ref 136–145)
SP GR UR STRIP: 1.01 (ref 1–1.03)
TRIGL SERPL-MCNC: 58 MG/DL (ref 30–150)
TSH SERPL DL<=0.005 MIU/L-ACNC: 0.74 UIU/ML (ref 0.34–5.6)
URN SPEC COLLECT METH UR: NORMAL
UROBILINOGEN UR STRIP-ACNC: NEGATIVE EU/DL
VIT B12 SERPL-MCNC: 358 PG/ML (ref 210–950)
WBC # BLD AUTO: 4.54 K/UL (ref 3.9–12.7)

## 2023-08-11 PROCEDURE — 4010F PR ACE/ARB THEARPY RXD/TAKEN: ICD-10-PCS | Mod: CPTII,,, | Performed by: NURSE PRACTITIONER

## 2023-08-11 PROCEDURE — 84443 ASSAY THYROID STIM HORMONE: CPT | Performed by: NURSE PRACTITIONER

## 2023-08-11 PROCEDURE — 3008F PR BODY MASS INDEX (BMI) DOCUMENTED: ICD-10-PCS | Mod: CPTII,,, | Performed by: NURSE PRACTITIONER

## 2023-08-11 PROCEDURE — 80053 COMPREHEN METABOLIC PANEL: CPT | Performed by: NURSE PRACTITIONER

## 2023-08-11 PROCEDURE — 3079F DIAST BP 80-89 MM HG: CPT | Mod: CPTII,,, | Performed by: NURSE PRACTITIONER

## 2023-08-11 PROCEDURE — 3075F SYST BP GE 130 - 139MM HG: CPT | Mod: CPTII,,, | Performed by: NURSE PRACTITIONER

## 2023-08-11 PROCEDURE — 1159F PR MEDICATION LIST DOCUMENTED IN MEDICAL RECORD: ICD-10-PCS | Mod: CPTII,,, | Performed by: NURSE PRACTITIONER

## 2023-08-11 PROCEDURE — 99214 OFFICE O/P EST MOD 30 MIN: CPT | Mod: S$PBB,,, | Performed by: NURSE PRACTITIONER

## 2023-08-11 PROCEDURE — 82570 ASSAY OF URINE CREATININE: CPT | Performed by: NURSE PRACTITIONER

## 2023-08-11 PROCEDURE — 85025 COMPLETE CBC W/AUTO DIFF WBC: CPT | Performed by: NURSE PRACTITIONER

## 2023-08-11 PROCEDURE — 80061 LIPID PANEL: CPT | Performed by: NURSE PRACTITIONER

## 2023-08-11 PROCEDURE — 3079F PR MOST RECENT DIASTOLIC BLOOD PRESSURE 80-89 MM HG: ICD-10-PCS | Mod: CPTII,,, | Performed by: NURSE PRACTITIONER

## 2023-08-11 PROCEDURE — 81003 URINALYSIS AUTO W/O SCOPE: CPT | Performed by: NURSE PRACTITIONER

## 2023-08-11 PROCEDURE — 99215 OFFICE O/P EST HI 40 MIN: CPT | Performed by: NURSE PRACTITIONER

## 2023-08-11 PROCEDURE — 36415 COLL VENOUS BLD VENIPUNCTURE: CPT | Performed by: NURSE PRACTITIONER

## 2023-08-11 PROCEDURE — 1160F RVW MEDS BY RX/DR IN RCRD: CPT | Mod: CPTII,,, | Performed by: NURSE PRACTITIONER

## 2023-08-11 PROCEDURE — 3008F BODY MASS INDEX DOCD: CPT | Mod: CPTII,,, | Performed by: NURSE PRACTITIONER

## 2023-08-11 PROCEDURE — 1159F MED LIST DOCD IN RCRD: CPT | Mod: CPTII,,, | Performed by: NURSE PRACTITIONER

## 2023-08-11 PROCEDURE — 3075F PR MOST RECENT SYSTOLIC BLOOD PRESS GE 130-139MM HG: ICD-10-PCS | Mod: CPTII,,, | Performed by: NURSE PRACTITIONER

## 2023-08-11 PROCEDURE — 1160F PR REVIEW ALL MEDS BY PRESCRIBER/CLIN PHARMACIST DOCUMENTED: ICD-10-PCS | Mod: CPTII,,, | Performed by: NURSE PRACTITIONER

## 2023-08-11 PROCEDURE — 4010F ACE/ARB THERAPY RXD/TAKEN: CPT | Mod: CPTII,,, | Performed by: NURSE PRACTITIONER

## 2023-08-11 PROCEDURE — 84403 ASSAY OF TOTAL TESTOSTERONE: CPT | Performed by: NURSE PRACTITIONER

## 2023-08-11 PROCEDURE — 82607 VITAMIN B-12: CPT | Performed by: NURSE PRACTITIONER

## 2023-08-11 PROCEDURE — 99214 PR OFFICE/OUTPT VISIT, EST, LEVL IV, 30-39 MIN: ICD-10-PCS | Mod: S$PBB,,, | Performed by: NURSE PRACTITIONER
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
## 2023-08-11 NOTE — TELEPHONE ENCOUNTER
----- Message from Jo Jimenez sent at 8/11/2023 10:06 AM CDT -----  Regarding: Concerning scheduling Colonoscopy  Type:  Needs Medical Advice    Who Called: pt      Would the patient rather a call back or a response via [facility]? Call back    Best Call Back Number: [phone]      Additional Information: pt was seen this morning and was referred to [facility] for a colonoscopy. However when pt tried to schedule no one knew how to schedule him. He would like to see if its possible to have it done somewhere else.      Please call Back to advise. Thanks!

## 2023-08-12 LAB — TESTOST SERPL-MCNC: 547 NG/DL (ref 264–916)

## 2023-08-14 NOTE — PROGRESS NOTES
[facility]  Occupational Therapy Treatment Note     Date: 8/15/2023  Name: Augustine Boswell  Clinic Number: [id number]    Therapy Diagnosis:      R elbow pain, decreased  /IADL  Physician: Gian Gonsalves MD     Physician Orders: Eval and treat, 1-2 times a week for 4 weeks, teach home program   Medical Diagnosis: M77.01 (ICD-10-CM) - Medial epicondylitis, right     Evaluation Date: 8/1/2023  Insurance Authorization Period Expiration: 10/10/2023  Plan of Care Certification Period: 10/30/23/23  Date of Return to MD: 9/5/23  Visit # / Visits authorized: 3 / 12  FOTO: Initial/58     Time In:0759  Time Out: 0845  Total Appointment Time (timed & untimed codes): 46 minutes     Surgical Procedure:   N/a      Precautions: Standard      Date of Onset: approx 8/2022                                  S/P: chronic     Subjective     Pt reports: he did a lot of yard work over the weekend and now the other side of his elbow is hurting as well. He has not noticed any tingling recently.    He was  compliant with home exercise program given last session.   Response to previous treatment:Good   Functional change: none reported     Pain:  Functional Pain Scale Rating 0-10:   2/10 now  2/10 at best  7-8/10 at worst  Location: R medial epi and radiates proximally      Objective     Objective Measures updated at progress report unless specified.    Not measured this date   Stress position wrist flexion   Left 69   Right 69        Stress Position  Strength (in pounds):  Setting II    Left Right   Elbow flexed Point of pain   95   Elbow flexed Max       NT due to sx 111   Elbow extended pronated Point of pain          65   Elbow extended pronated Max          NT due to sx 107                       Treatment     Betito received the treatments listed below:      Betito received therapeutic activities to increase ROM, endurance, and/or strength for 46  minutes including:  -Ultrasound:  1 Mhz, 50 % duty cycle, 1.0  w/cm2 for 8 minutes at R medial elbow to decrease pain and improve circulation.   -STM to R medial elbow and FA musculature x 10 min   -extrinsic flexor stretches: supinated, pronated, and over chair back  -bicep stretch over chair back (supinated and pronated) (NT)   -tricep stretch (NT)   -ulnar nerve glide 7 reps   -wrist flexion and extension eccentrics with 4# weight 2x10 each     Patient Education and Home Exercises     Education provided:   - continue same      Written Home Exercises Provided: Patient instructed to cont prior HEP.  Exercises were reviewed and Betito was able to demonstrate them prior to the end of the session.  Betito demonstrated good  understanding of the HEP provided. See EMR under Patient Instructions for exercises provided during therapy sessions.       Assessment     Pt would continue to benefit from skilled OT. Paresthesias resolving. Pt performed yard work over the weekend with exacerbation of pain at R lateral elbow reported. Progressed eccentrics to 4# at pt request.     Betito is progressing well towards his goals and there are no updates to goals at this time.   - Progress towards goals: Good; STG 3 met     Pt prognosis is Good.    Pt will continue to benefit from skilled outpatient occupational therapy to address the deficits listed in the problem list on initial evaluation provide pt/family education and to maximize pt's level of independence in the home and community environment.     Pt's spiritual, cultural and educational needs considered and pt agreeable to plan of care and goals.    Anticipated barriers to occupational therapy: none     Goals:  Short Term Goals: (4 weeks)  1. Pt will be independent with HEP in 2 visits. (Met 8/8/23)  2. Pt will report decreased pain to a 5-6/10 at worst.  3. Pt will progress to extrinsic stretches with elbow extended in 5 visits.  (met 8/15/23)  4. Pt will be I with lifting modifications to reduce risk of recurrence.   5. Pt will increase point of  pain  testing on R with elbow extended to 75#.      Long Term Goals: (by discharge)  1. Pt will report decreased pain to 1-2/10 with ADLs.   2. Pt will exhibit a significant increase (7+ points) in the FOTO assessment.  3. Pt will be independent with self management of future exacerbations.   4.Pt will increase point of pain  testing on R with elbow extended to 85#.   5. Pt will return to PLOF.       Plan     Certification Period/Plan of care expiration: 8/1/2023 to 10/30/23.     Continue Occupational Therapy 1-2 times per week x 4 weeks to decrease pain and edema, and increase A/PROM, strength, and functional use of R upper extremity.     Updates/Grading for next session: progress as tolerated.    NISA Wylie, MJT

## 2023-08-14 NOTE — TELEPHONE ENCOUNTER
Called [facility] to see if appointment has been placed. They had no evidence of referral. Pended it again.

## 2023-08-15 ENCOUNTER — CLINICAL SUPPORT (OUTPATIENT)
Dept: REHABILITATION | Facility: HOSPITAL | Age: 48
End: 2023-08-15
Payer: MEDICAID

## 2023-08-15 DIAGNOSIS — Z78.9 ALTERATION IN INSTRUMENTAL ACTIVITIES OF DAILY LIVING (IADL): ICD-10-CM

## 2023-08-15 DIAGNOSIS — R29.898 DECREASED GRIP STRENGTH OF RIGHT HAND: ICD-10-CM

## 2023-08-15 DIAGNOSIS — M25.521 RIGHT ELBOW PAIN: Primary | ICD-10-CM

## 2023-08-15 PROCEDURE — 97530 THERAPEUTIC ACTIVITIES: CPT | Mod: PO

## 2023-08-17 NOTE — TELEPHONE ENCOUNTER
8-17-23. Checked with [facility] at 1150 to make sure referral was in and again there was no evidence of a referral.  Left message for callback to give someone info about patient.and referral.

## 2023-08-22 ENCOUNTER — CLINICAL SUPPORT (OUTPATIENT)
Dept: REHABILITATION | Facility: HOSPITAL | Age: 48
End: 2023-08-22
Payer: MEDICAID

## 2023-08-22 DIAGNOSIS — M25.521 RIGHT ELBOW PAIN: ICD-10-CM

## 2023-08-22 DIAGNOSIS — Z78.9 ALTERATION IN INSTRUMENTAL ACTIVITIES OF DAILY LIVING (IADL): Primary | ICD-10-CM

## 2023-08-22 DIAGNOSIS — R29.898 DECREASED GRIP STRENGTH OF RIGHT HAND: ICD-10-CM

## 2023-08-22 PROCEDURE — 97530 THERAPEUTIC ACTIVITIES: CPT | Mod: PO

## 2023-08-22 NOTE — PROGRESS NOTES
"    [facility]  Occupational Therapy Treatment Note     Date: 8/22/2023  Name: Augustine Boswell  Clinic Number: [id number]    Therapy Diagnosis:      R elbow pain, decreased  /IADL  Physician: Gian Gonsalves MD     Physician Orders: Eval and treat, 1-2 times a week for 4 weeks, teach home program   Medical Diagnosis: M77.01 (ICD-10-CM) - Medial epicondylitis, right     Evaluation Date: 8/1/2023  Insurance Authorization Period Expiration: 10/10/2023  Plan of Care Certification Period: 10/30/23/23  Date of Return to MD: 9/5/23  Visit # / Visits authorized: 4 / 12  FOTO: Initial/58     Time In:0819  Time Out: 0845  Total Appointment Time (timed & untimed codes): 26 minutes  19 min late for appt due to being treated in [employer] [facility] for cryotherapy     Surgical Procedure:   N/a      Precautions: Standard      Date of Onset: approx 8/2022                                  S/P: chronic     Subjective     Pt reports: "My arm usually feels better when I leave here."    He was  compliant with home exercise program given last session.   Response to previous treatment:Good   Functional change: none reported     Pain:  Functional Pain Scale Rating 0-10:   2/10 now  2/10 at best  7-8/10 at worst  Location: R medial epi and radiates proximally      Objective     Objective Measures updated at progress report unless specified.    Not measured this date   Stress position wrist flexion   Left 69   Right 69        Stress Position  Strength (in pounds):  Setting II    Left Right   Elbow flexed Point of pain   95   Elbow flexed Max       NT due to sx 111   Elbow extended pronated Point of pain          65   Elbow extended pronated Max          NT due to sx 107                       Treatment     Betito received the treatments listed below:      Betito received therapeutic activities to increase ROM, endurance, and/or strength for 26  minutes including:  -Ultrasound:  1 Mhz, 50 % duty cycle, 1.0 w/cm2 for " 8 minutes at R medial elbow to decrease pain and improve circulation. (NT)  -STM to R medial elbow and FA musculature x 10 min   -extrinsic flexor stretches: supinated, pronated, and over chair back  -bicep stretch over chair back (supinated and pronated) (NT)   -tricep stretch (NT)   -ulnar nerve glide 7 reps  (NT)  -wrist flexion and extension eccentrics with 4# weight 2x10 each  -calibrated gripper 35# isometric holds 3x10     Patient Education and Home Exercises     Education provided:   - continue same      Written Home Exercises Provided: Patient instructed to cont prior HEP.  Exercises were reviewed and Betito was able to demonstrate them prior to the end of the session.  Betito demonstrated good  understanding of the HEP provided. See EMR under Patient Instructions for exercises provided during therapy sessions.       Assessment     Pt would continue to benefit from skilled OT.  Pt reported soreness at medial and lateral epicondyle since last session. He has received additional cryotherapy treatments. Progressed to isometric  without report of pain.   Betito is progressing well towards his goals and there are no updates to goals at this time.   - Progress towards goals: Good; STG 3 met     Pt prognosis is Good.    Pt will continue to benefit from skilled outpatient occupational therapy to address the deficits listed in the problem list on initial evaluation provide pt/family education and to maximize pt's level of independence in the home and community environment.     Pt's spiritual, cultural and educational needs considered and pt agreeable to plan of care and goals.    Anticipated barriers to occupational therapy: none     Goals:  Short Term Goals: (4 weeks)  1. Pt will be independent with HEP in 2 visits. (Met 8/8/23)  2. Pt will report decreased pain to a 5-6/10 at worst.  3. Pt will progress to extrinsic stretches with elbow extended in 5 visits.  (met 8/15/23)  4. Pt will be I with lifting modifications to  reduce risk of recurrence.   5. Pt will increase point of pain  testing on R with elbow extended to 75#.      Long Term Goals: (by discharge)  1. Pt will report decreased pain to 1-2/10 with ADLs.   2. Pt will exhibit a significant increase (7+ points) in the FOTO assessment.  3. Pt will be independent with self management of future exacerbations.   4.Pt will increase point of pain  testing on R with elbow extended to 85#.   5. Pt will return to PLOF.       Plan     Certification Period/Plan of care expiration: 8/1/2023 to 10/30/23.     Continue Occupational Therapy 1-2 times per week x 4 weeks to decrease pain and edema, and increase A/PROM, strength, and functional use of R upper extremity.     Updates/Grading for next session: progress as tolerated.    NISA Wylie, CHT

## 2023-08-28 NOTE — PROGRESS NOTES
[facility] OUTPATIENT THERAPY AND WELLNESS  Occupational Therapy Treatment Note     Date: 8/29/2023  Name: Augustine Boswell  Clinic Number: [id number]    Therapy Diagnosis:      R elbow pain, decreased  /IADL  Physician: Gian Gonsalves MD     Physician Orders: Eval and treat, 1-2 times a week for 4 weeks, teach home program   Medical Diagnosis: M77.01 (ICD-10-CM) - Medial epicondylitis, right     Evaluation Date: 8/1/2023  Insurance Authorization Period Expiration: 10/10/2023  Plan of Care Certification Period: 10/30/23/23  Date of Return to MD: 9/5/23  Visit # / Visits authorized: 5 / 12  FOTO: Initial/58     Time In:0745  Time Out: 0830  Total Appointment Time (timed & untimed codes): 45 minutes     Surgical Procedure:   N/a      Precautions: Standard      Date of Onset: approx 8/2022                                  S/P: chronic     Subjective     Pt reports: pain at inner elbow is much better but outside of elbow is hurting.     He was  compliant with home exercise program given last session.   Response to previous treatment:Good   Functional change: none reported     Pain:  Functional Pain Scale Rating 0-10:   2/10 now  2/10 at best  7-8/10 at worst  Location: R medial epi and radiates proximally      Objective     Objective Measures updated at progress report unless specified.    Not measured this date   Stress position wrist flexion   Left 69   Right 69        Stress Position  Strength (in pounds):  Setting II    Left Right   Elbow flexed Point of pain   95   Elbow flexed Max       NT due to sx 111   Elbow extended pronated Point of pain          65   Elbow extended pronated Max          NT due to sx 107               FOTO: 48 (-10)        Treatment     Betito received the treatments listed below:      Betito received therapeutic activities to increase ROM, endurance, and/or strength for 45  minutes including:  -Ultrasound:  1 Mhz, 50 % duty cycle, 1.0 w/cm2 for 8 minutes at R medial elbow to decrease pain and  improve circulation.   -STM to R medial elbow and FA musculature x 10 min   -extrinsic flexor stretches: supinated, pronated, and over chair back  -bicep stretch over chair back (supinated and pronated)   -tricep stretch (NT)   -ulnar nerve glide 4 reps    -wrist flexion and extension eccentrics with 4# weight 2x10 each  -calibrated gripper 35# isometric holds 3x10  -     Patient Education and Home Exercises     Education provided:   - continue same      Written Home Exercises Provided: Patient instructed to cont prior HEP.  Exercises were reviewed and Betito was able to demonstrate them prior to the end of the session.  Betito demonstrated good  understanding of the HEP provided. See EMR under Patient Instructions for exercises provided during therapy sessions.       Assessment     Pt would continue to benefit from skilled OT.  Pain at medial epicondyle resolving however pt reports increased pain at lateral epicondyle. Pt purchased a gripper but was instructed to not overdo exercises at home due to lat epi pain. FOTO score decreased by 10 points.  Betito is progressing well towards his goals and there are no updates to goals at this time.   - Progress towards goals: Good;     Pt prognosis is Good.    Pt will continue to benefit from skilled outpatient occupational therapy to address the deficits listed in the problem list on initial evaluation provide pt/family education and to maximize pt's level of independence in the home and community environment.     Pt's spiritual, cultural and educational needs considered and pt agreeable to plan of care and goals.    Anticipated barriers to occupational therapy: none     Goals:  Short Term Goals: (4 weeks)  1. Pt will be independent with HEP in 2 visits. (Met 8/8/23)  2. Pt will report decreased pain to a 5-6/10 at worst.  3. Pt will progress to extrinsic stretches with elbow extended in 5 visits.  (met 8/15/23)  4. Pt will be I with lifting modifications to reduce risk of  recurrence.   5. Pt will increase point of pain  testing on R with elbow extended to 75#.      Long Term Goals: (by discharge)  1. Pt will report decreased pain to 1-2/10 with ADLs.   2. Pt will exhibit a significant increase (7+ points) in the FOTO assessment.  3. Pt will be independent with self management of future exacerbations.   4.Pt will increase point of pain  testing on R with elbow extended to 85#.   5. Pt will return to PLOF.       Plan     Certification Period/Plan of care expiration: 8/1/2023 to 10/30/23.     Pt to follow up with MD next week.   Continue Occupational Therapy 1-2 times per week x 4 weeks to decrease pain and edema, and increase A/PROM, strength, and functional use of R upper extremity.     Updates/Grading for next session: progress as tolerated.    NISA Wylie, CHT

## 2023-08-29 ENCOUNTER — CLINICAL SUPPORT (OUTPATIENT)
Dept: REHABILITATION | Facility: HOSPITAL | Age: 48
End: 2023-08-29
Payer: MEDICAID

## 2023-08-29 DIAGNOSIS — M25.521 RIGHT ELBOW PAIN: ICD-10-CM

## 2023-08-29 DIAGNOSIS — R29.898 DECREASED GRIP STRENGTH OF RIGHT HAND: ICD-10-CM

## 2023-08-29 DIAGNOSIS — Z78.9 ALTERATION IN INSTRUMENTAL ACTIVITIES OF DAILY LIVING (IADL): Primary | ICD-10-CM

## 2023-08-29 PROCEDURE — 97530 THERAPEUTIC ACTIVITIES: CPT | Mod: PO

## 2023-09-05 ENCOUNTER — PATIENT MESSAGE (OUTPATIENT)
Dept: ORTHOPEDICS | Facility: CLINIC | Age: 48
End: 2023-09-05

## 2023-09-05 ENCOUNTER — OFFICE VISIT (OUTPATIENT)
Dept: ORTHOPEDICS | Facility: CLINIC | Age: 48
End: 2023-09-05
Payer: MEDICAID

## 2023-09-05 VITALS — WEIGHT: 225 LBS | BODY MASS INDEX: 28.88 KG/M2 | HEIGHT: 74 IN

## 2023-09-05 DIAGNOSIS — M77.11 LATERAL EPICONDYLITIS, RIGHT ELBOW: Primary | ICD-10-CM

## 2023-09-05 DIAGNOSIS — S56.511A PARTIAL TEAR OF COMMON EXTENSOR TENDON OF RIGHT ELBOW: ICD-10-CM

## 2023-09-05 PROCEDURE — 3061F NEG MICROALBUMINURIA REV: CPT | Mod: CPTII,S$GLB,, | Performed by: ORTHOPAEDIC SURGERY

## 2023-09-05 PROCEDURE — 3061F PR NEG MICROALBUMINURIA RESULT DOCUMENTED/REVIEW: ICD-10-PCS | Mod: CPTII,S$GLB,, | Performed by: ORTHOPAEDIC SURGERY

## 2023-09-05 PROCEDURE — 3066F NEPHROPATHY DOC TX: CPT | Mod: CPTII,S$GLB,, | Performed by: ORTHOPAEDIC SURGERY

## 2023-09-05 PROCEDURE — 1159F PR MEDICATION LIST DOCUMENTED IN MEDICAL RECORD: ICD-10-PCS | Mod: CPTII,S$GLB,, | Performed by: ORTHOPAEDIC SURGERY

## 2023-09-05 PROCEDURE — 1160F RVW MEDS BY RX/DR IN RCRD: CPT | Mod: CPTII,S$GLB,, | Performed by: ORTHOPAEDIC SURGERY

## 2023-09-05 PROCEDURE — 99213 OFFICE O/P EST LOW 20 MIN: CPT | Mod: S$GLB,,, | Performed by: ORTHOPAEDIC SURGERY

## 2023-09-05 PROCEDURE — 3008F BODY MASS INDEX DOCD: CPT | Mod: CPTII,S$GLB,, | Performed by: ORTHOPAEDIC SURGERY

## 2023-09-05 PROCEDURE — 1160F PR REVIEW ALL MEDS BY PRESCRIBER/CLIN PHARMACIST DOCUMENTED: ICD-10-PCS | Mod: CPTII,S$GLB,, | Performed by: ORTHOPAEDIC SURGERY

## 2023-09-05 PROCEDURE — 4010F PR ACE/ARB THEARPY RXD/TAKEN: ICD-10-PCS | Mod: CPTII,S$GLB,, | Performed by: ORTHOPAEDIC SURGERY

## 2023-09-05 PROCEDURE — 4010F ACE/ARB THERAPY RXD/TAKEN: CPT | Mod: CPTII,S$GLB,, | Performed by: ORTHOPAEDIC SURGERY

## 2023-09-05 PROCEDURE — 99213 PR OFFICE/OUTPT VISIT, EST, LEVL III, 20-29 MIN: ICD-10-PCS | Mod: S$GLB,,, | Performed by: ORTHOPAEDIC SURGERY

## 2023-09-05 PROCEDURE — 1159F MED LIST DOCD IN RCRD: CPT | Mod: CPTII,S$GLB,, | Performed by: ORTHOPAEDIC SURGERY

## 2023-09-05 PROCEDURE — 3066F PR DOCUMENTATION OF TREATMENT FOR NEPHROPATHY: ICD-10-PCS | Mod: CPTII,S$GLB,, | Performed by: ORTHOPAEDIC SURGERY

## 2023-09-05 PROCEDURE — 3008F PR BODY MASS INDEX (BMI) DOCUMENTED: ICD-10-PCS | Mod: CPTII,S$GLB,, | Performed by: ORTHOPAEDIC SURGERY
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
## 2023-09-08 ENCOUNTER — PATIENT MESSAGE (OUTPATIENT)
Dept: ORTHOPEDICS | Facility: CLINIC | Age: 48
End: 2023-09-08

## 2023-09-12 ENCOUNTER — OFFICE VISIT (OUTPATIENT)
Dept: URGENT CARE | Facility: CLINIC | Age: 48
End: 2023-09-12
Payer: MEDICAID

## 2023-09-12 VITALS
HEART RATE: 64 BPM | TEMPERATURE: 98 F | RESPIRATION RATE: 16 BRPM | OXYGEN SATURATION: 98 % | DIASTOLIC BLOOD PRESSURE: 87 MMHG | SYSTOLIC BLOOD PRESSURE: 136 MMHG

## 2023-09-12 DIAGNOSIS — R09.81 SINUS CONGESTION: ICD-10-CM

## 2023-09-12 DIAGNOSIS — J06.9 VIRAL URI WITH COUGH: Primary | ICD-10-CM

## 2023-09-12 DIAGNOSIS — J02.9 SORE THROAT: ICD-10-CM

## 2023-09-12 LAB
CTP QC/QA: YES
CTP QC/QA: YES
MOLECULAR STREP A: NEGATIVE
SARS-COV-2 AG RESP QL IA.RAPID: NEGATIVE

## 2023-09-12 PROCEDURE — 87811 SARS-COV-2 COVID19 W/OPTIC: CPT | Mod: QW,S$GLB,, | Performed by: PHYSICIAN ASSISTANT

## 2023-09-12 PROCEDURE — 99213 PR OFFICE/OUTPT VISIT, EST, LEVL III, 20-29 MIN: ICD-10-PCS | Mod: S$GLB,,, | Performed by: PHYSICIAN ASSISTANT

## 2023-09-12 PROCEDURE — 87651 STREP A DNA AMP PROBE: CPT | Mod: QW,S$GLB,, | Performed by: PHYSICIAN ASSISTANT

## 2023-09-12 PROCEDURE — 99213 OFFICE O/P EST LOW 20 MIN: CPT | Mod: S$GLB,,, | Performed by: PHYSICIAN ASSISTANT

## 2023-09-12 PROCEDURE — 87811 SARS CORONAVIRUS 2 ANTIGEN POCT, MANUAL READ: ICD-10-PCS | Mod: QW,S$GLB,, | Performed by: PHYSICIAN ASSISTANT

## 2023-09-12 PROCEDURE — 87651 POCT STREP A MOLECULAR: ICD-10-PCS | Mod: QW,S$GLB,, | Performed by: PHYSICIAN ASSISTANT

## 2023-09-12 RX ORDER — PREDNISONE 10 MG/1
TABLET ORAL
Qty: 11 TABLET | Refills: 0 | Status: SHIPPED | OUTPATIENT
Start: 2023-09-12 | End: 2023-10-02 | Stop reason: CLARIF

## 2023-09-12 NOTE — PROGRESS NOTES
Subjective:      Patient ID: Augustine Boswell is a 48 y.o. male.    Vitals:  temperature is 97.7 °F (36.5 °C). His blood pressure is 136/87 and his pulse is 64. His respiration is 16 and oxygen saturation is 98%.     Chief Complaint: Sinus Problem    Pt presents with sinus matias x 3-4 days, sore throat, headache, bodyache x today.  Exposed to strep    Sinus Problem  This is a new problem. The current episode started in the past 7 days. The problem has been gradually worsening since onset. There has been no fever. He is experiencing no pain. Associated symptoms include congestion, coughing, headaches, sinus pressure and a sore throat. Treatments tried: claritin, emergen-c. The treatment provided mild relief.       HENT:  Positive for congestion, sinus pressure and sore throat.    Respiratory:  Positive for cough.    Neurological:  Positive for headaches.      Objective:     Physical Exam   Constitutional: He does not appear ill. No distress.   HENT:   Head: Normocephalic and atraumatic.   Ears:   Right Ear: External ear normal.   Left Ear: External ear normal.   Mouth/Throat: Mucous membranes are moist. No oropharyngeal exudate or posterior oropharyngeal erythema. Oropharynx is clear.   Eyes: Conjunctivae are normal. Right eye exhibits no discharge. Left eye exhibits no discharge. Extraocular movement intact   Cardiovascular: Normal rate, regular rhythm and normal heart sounds.   No murmur heard.  Pulmonary/Chest: Effort normal and breath sounds normal. He has no wheezes. He has no rhonchi. He has no rales.   Abdominal: Normal appearance.   Musculoskeletal: Normal range of motion.         General: Normal range of motion.   Neurological: He is alert.   Skin: Skin is warm, dry and not pale. jaundice  Psychiatric: His behavior is normal. Mood, judgment and thought content normal.   Nursing note and vitals reviewed.      Assessment:     1. Viral URI with cough    2. Sinus congestion    3. Sore throat        Plan:        Viral URI with cough    Sinus congestion  -     SARS Coronavirus 2 Antigen, POCT Manual Read    Sore throat  -     POCT Strep A, Molecular    Results for orders placed or performed in visit on 09/12/23   SARS Coronavirus 2 Antigen, POCT Manual Read   Result Value Ref Range    SARS Coronavirus 2 Antigen Negative Negative     Acceptable Yes    POCT Strep A, Molecular   Result Value Ref Range    Molecular Strep A, POC Negative Negative     Acceptable Yes         Other orders  -     predniSONE (DELTASONE) 10 MG tablet; Take 40mg for 1 days, take 30mg for 1 days, take 20mg for 1 days, take 10mg for 2 days  Dispense: 11 tablet; Refill: 0      Cough, Runny Nose, and the Common Cold   The Basics   Written by the doctors and editors at [facility]   What causes cough, runny nose, and other symptoms of the common cold? -- These symptoms are usually caused by a viral infection. Lots of different viruses can take hold inside your nose, mouth, throat, or airways and cause cold symptoms.  Most people get over a cold without any lasting problems. Even so, having a cold can be uncomfortable. Also, some cold symptoms can also be caused by other illnesses, such as coronavirus 2019 (COVID-19) or the flu.  What are the symptoms of the common cold? -- The symptoms include:  Sneezing  Coughing  Sniffling and runny nose  Sore throat  Chest congestion  In children, the common cold can also cause a fever. But adults do not usually get a fever when they have a cold. Some symptoms of the common cold can overlap with symptoms of COVID-19, although sneezing is uncommon in COVID-19.   When should I call the doctor or nurse? -- Contact your doctor or nurse if you live in an area where people have COVID-19. They will ask you questions about your symptoms and whether you might be at risk. They can tell you if you should get tested for the virus that causes COVID-19. If they think you are more likely to just have a  cold, they might tell you to stay home and contact them again if your symptoms change or get worse.   You should also contact your doctor or nurse if you:  Lose your sense of taste or smell  Have a fever of more than 100.4º F (38º C) that comes with shaking chills, loss of appetite, or trouble breathing  Have a fever and also have lung disease, such as emphysema or asthma  Have a cough that lasts longer than 10 days  Have chest pain when you cough or breathe deeply, have trouble breathing, or cough up blood  If you are older than 65, or if you have any chronic medical conditions such as diabetes, you should contact your doctor or nurse any time you get a long-lasting cough.  Take your child to the emergency room if they:  Become confused or stop responding to you  Have trouble breathing or have to work hard to breathe  Contact your child's doctor or nurse if the child:  Refuses to drink anything for a long time  Is younger than 4 months  Has a fever and is not acting like themself  Has a cough that lasts for more than 2 weeks and is not getting any better  Has a stuffed or runny nose that gets worse or does not get any better after 10 days  Has red eyes or yellow goop coming out of their eyes  Has ear pain, pulls at their ears, or shows other signs of having an ear infection  What can I do to feel better? -- If you are a teenager or an adult, you can try cough and cold medicines that you can get without a prescription. These medicines might help with your symptoms. But they won't cure your cold, or help you get well faster.  If you decide to try nonprescription cold medicines, be sure to follow the directions on the label. Do not combine 2 or more medicines that have acetaminophen in them. If you take too much acetaminophen, the drug can damage your liver. Also, if you have a heart condition, high blood pressure, or you take any prescription medicines, ask your pharmacist if it is safe to take the cold medicine you  have in mind.  What should I know if my child has a cold? -- In children, the common cold is often more severe than it is in adults. It also lasts longer. Plus, children often get a fever during the first 3 days of a cold.  Are cough and cold medicines safe for children? -- If your child is younger than 6, you should not give them any cold medicines. These medicines are not safe for young children. Even if your child is older than 6, cough and cold medicines are unlikely to help.  Never give aspirin to any child younger than 18 years old. In children, aspirin can cause a life-threatening condition called Reye syndrome. When giving your child acetaminophen or other nonprescription medicines, never give more than the recommended dose.  How long will I be sick? -- Colds usually last 3 to 7 days in adults and 10 days in children, but some people have symptoms for up to 2 weeks.  Can the common cold lead to more serious problems? -- In some cases, yes. In some people having a cold can lead to:  Ear infections  Worsening of asthma symptoms  Sinus infections  Pneumonia or bronchitis (infections of the lungs)  How can I keep from getting another cold? -- The most important thing you can do is to wash your hands often with soap and water. This can also prevent the spread of other illnesses like the flu and COVID-19. The table has instructions on how to wash your hands to prevent spreading illness (table 1).  The germs that cause the common cold can live on tables, door handles, and other surfaces for at least 2 hours. You never know when you might be touching germs. That's why it's so important to clean your hands often.  It's also important to stay away from other people when you are sick. This will help prevent the spread of illness.  All topics are updated as new evidence becomes available and our peer review process is complete.  This topic retrieved from [employer] on: Sep 21, 2021.  Topic 60630 Version 20.0  Release:  29.4.2 - C29.263  © 2021 UpToDate, Inc. and/or its affiliates. All rights reserved.  table 1: Hand washing to prevent spreading illness  Wet your hands and put soap on them    Rub your hands together for at least 20 seconds. Make sure to clean your wrists, fingernails, and in between your fingers.    Rinse your hands    Dry your hands with a paper towel that you can throw away    If you are not near a sink, you can use a hand gel to clean your hands. The gels with at least 60 percent alcohol work the best. But it is better to wash with soap and water if you can.  Graphic 989342 Version 3.0  Consumer Information Use and Disclaimer   This information is not specific medical advice and does not replace information you receive from your health care provider. This is only a brief summary of general information. It does NOT include all information about conditions, illnesses, injuries, tests, procedures, treatments, therapies, discharge instructions or life-style choices that may apply to you. You must talk with your health care provider for complete information about your health and treatment options. This information should not be used to decide whether or not to accept your health care provider's advice, instructions or recommendations. Only your health care provider has the knowledge and training to provide advice that is right for you. The use of this information is governed by the [employer] End User License Agreement, available at https://[email or web address]/en/[email or web address]/about/nancy.The use of [employer] content is governed by the [employer] Terms of Use. ©2021 UpToDate, Inc. All rights reserved.  Copyright   © 2021 UpToDate, Inc. and/or its affiliates. All rights reserved.

## 2023-09-15 ENCOUNTER — HOSPITAL ENCOUNTER (OUTPATIENT)
Dept: RADIOLOGY | Facility: HOSPITAL | Age: 48
Discharge: HOME | End: 2023-09-15
Attending: ORTHOPAEDIC SURGERY
Payer: MEDICAID

## 2023-09-15 DIAGNOSIS — S56.511A PARTIAL TEAR OF COMMON EXTENSOR TENDON OF RIGHT ELBOW: ICD-10-CM

## 2023-09-15 DIAGNOSIS — M77.11 LATERAL EPICONDYLITIS, RIGHT ELBOW: ICD-10-CM

## 2023-09-15 PROCEDURE — 73221 MRI JOINT UPR EXTREM W/O DYE: CPT | Mod: TC,PO,RT

## 2023-09-19 ENCOUNTER — PATIENT MESSAGE (OUTPATIENT)
Dept: ADMINISTRATIVE | Facility: OTHER | Age: 48
End: 2023-09-19
Payer: MEDICAID

## 2023-09-19 ENCOUNTER — OFFICE VISIT (OUTPATIENT)
Dept: ORTHOPEDICS | Facility: CLINIC | Age: 48
End: 2023-09-19
Payer: MEDICAID

## 2023-09-19 ENCOUNTER — LAB VISIT (OUTPATIENT)
Dept: LAB | Facility: HOSPITAL | Age: 48
End: 2023-09-19
Attending: ORTHOPAEDIC SURGERY
Payer: MEDICAID

## 2023-09-19 VITALS — WEIGHT: 225 LBS | BODY MASS INDEX: 28.88 KG/M2 | HEIGHT: 74 IN

## 2023-09-19 DIAGNOSIS — Z01.818 PRE-OP TESTING: ICD-10-CM

## 2023-09-19 DIAGNOSIS — M77.11 LATERAL EPICONDYLITIS, RIGHT ELBOW: Primary | ICD-10-CM

## 2023-09-19 LAB
ALBUMIN SERPL BCP-MCNC: 4.5 G/DL (ref 3.5–5.2)
ALP SERPL-CCNC: 68 U/L (ref 55–135)
ALT SERPL W/O P-5'-P-CCNC: 21 U/L (ref 10–44)
ANION GAP SERPL CALC-SCNC: 5 MMOL/L (ref 8–16)
AST SERPL-CCNC: 18 U/L (ref 10–40)
BASOPHILS # BLD AUTO: 0.06 K/UL (ref 0–0.2)
BASOPHILS NFR BLD: 0.8 % (ref 0–1.9)
BILIRUB SERPL-MCNC: 0.8 MG/DL (ref 0.1–1)
BUN SERPL-MCNC: 17 MG/DL (ref 6–20)
CALCIUM SERPL-MCNC: 9.6 MG/DL (ref 8.7–10.5)
CHLORIDE SERPL-SCNC: 106 MMOL/L (ref 95–110)
CO2 SERPL-SCNC: 29 MMOL/L (ref 23–29)
CREAT SERPL-MCNC: 1 MG/DL (ref 0.5–1.4)
DIFFERENTIAL METHOD: NORMAL
EOSINOPHIL # BLD AUTO: 0.1 K/UL (ref 0–0.5)
EOSINOPHIL NFR BLD: 1.9 % (ref 0–8)
ERYTHROCYTE [DISTWIDTH] IN BLOOD BY AUTOMATED COUNT: 12.2 % (ref 11.5–14.5)
EST. GFR  (NO RACE VARIABLE): >60 ML/MIN/1.73 M^2
GLUCOSE SERPL-MCNC: 93 MG/DL (ref 70–110)
HCT VFR BLD AUTO: 48.5 % (ref 40–54)
HGB BLD-MCNC: 16.3 G/DL (ref 14–18)
IMM GRANULOCYTES # BLD AUTO: 0.02 K/UL (ref 0–0.04)
IMM GRANULOCYTES NFR BLD AUTO: 0.3 % (ref 0–0.5)
LYMPHOCYTES # BLD AUTO: 1.6 K/UL (ref 1–4.8)
LYMPHOCYTES NFR BLD: 21.5 % (ref 18–48)
MCH RBC QN AUTO: 30.8 PG (ref 27–31)
MCHC RBC AUTO-ENTMCNC: 33.6 G/DL (ref 32–36)
MCV RBC AUTO: 92 FL (ref 82–98)
MONOCYTES # BLD AUTO: 0.7 K/UL (ref 0.3–1)
MONOCYTES NFR BLD: 9 % (ref 4–15)
NEUTROPHILS # BLD AUTO: 4.9 K/UL (ref 1.8–7.7)
NEUTROPHILS NFR BLD: 66.5 % (ref 38–73)
NRBC BLD-RTO: 0 /100 WBC
PLATELET # BLD AUTO: 220 K/UL (ref 150–450)
PMV BLD AUTO: 11.7 FL (ref 9.2–12.9)
POTASSIUM SERPL-SCNC: 4.1 MMOL/L (ref 3.5–5.1)
PROT SERPL-MCNC: 7.7 G/DL (ref 6–8.4)
RBC # BLD AUTO: 5.3 M/UL (ref 4.6–6.2)
SODIUM SERPL-SCNC: 140 MMOL/L (ref 136–145)
WBC # BLD AUTO: 7.34 K/UL (ref 3.9–12.7)

## 2023-09-19 PROCEDURE — 99213 PR OFFICE/OUTPT VISIT, EST, LEVL III, 20-29 MIN: ICD-10-PCS | Mod: S$GLB,,, | Performed by: ORTHOPAEDIC SURGERY

## 2023-09-19 PROCEDURE — 1160F RVW MEDS BY RX/DR IN RCRD: CPT | Mod: CPTII,S$GLB,, | Performed by: ORTHOPAEDIC SURGERY

## 2023-09-19 PROCEDURE — 1159F MED LIST DOCD IN RCRD: CPT | Mod: CPTII,S$GLB,, | Performed by: ORTHOPAEDIC SURGERY

## 2023-09-19 PROCEDURE — 3061F NEG MICROALBUMINURIA REV: CPT | Mod: CPTII,S$GLB,, | Performed by: ORTHOPAEDIC SURGERY

## 2023-09-19 PROCEDURE — 3061F PR NEG MICROALBUMINURIA RESULT DOCUMENTED/REVIEW: ICD-10-PCS | Mod: CPTII,S$GLB,, | Performed by: ORTHOPAEDIC SURGERY

## 2023-09-19 PROCEDURE — 3008F PR BODY MASS INDEX (BMI) DOCUMENTED: ICD-10-PCS | Mod: CPTII,S$GLB,, | Performed by: ORTHOPAEDIC SURGERY

## 2023-09-19 PROCEDURE — 99213 OFFICE O/P EST LOW 20 MIN: CPT | Mod: S$GLB,,, | Performed by: ORTHOPAEDIC SURGERY

## 2023-09-19 PROCEDURE — 36415 COLL VENOUS BLD VENIPUNCTURE: CPT | Performed by: ORTHOPAEDIC SURGERY

## 2023-09-19 PROCEDURE — 85025 COMPLETE CBC W/AUTO DIFF WBC: CPT | Performed by: ORTHOPAEDIC SURGERY

## 2023-09-19 PROCEDURE — 80053 COMPREHEN METABOLIC PANEL: CPT | Performed by: ORTHOPAEDIC SURGERY

## 2023-09-19 PROCEDURE — 3008F BODY MASS INDEX DOCD: CPT | Mod: CPTII,S$GLB,, | Performed by: ORTHOPAEDIC SURGERY

## 2023-09-19 PROCEDURE — 1159F PR MEDICATION LIST DOCUMENTED IN MEDICAL RECORD: ICD-10-PCS | Mod: CPTII,S$GLB,, | Performed by: ORTHOPAEDIC SURGERY

## 2023-09-19 PROCEDURE — 4010F ACE/ARB THERAPY RXD/TAKEN: CPT | Mod: CPTII,S$GLB,, | Performed by: ORTHOPAEDIC SURGERY

## 2023-09-19 PROCEDURE — 4010F PR ACE/ARB THEARPY RXD/TAKEN: ICD-10-PCS | Mod: CPTII,S$GLB,, | Performed by: ORTHOPAEDIC SURGERY

## 2023-09-19 PROCEDURE — 3066F NEPHROPATHY DOC TX: CPT | Mod: CPTII,S$GLB,, | Performed by: ORTHOPAEDIC SURGERY

## 2023-09-19 PROCEDURE — 1160F PR REVIEW ALL MEDS BY PRESCRIBER/CLIN PHARMACIST DOCUMENTED: ICD-10-PCS | Mod: CPTII,S$GLB,, | Performed by: ORTHOPAEDIC SURGERY

## 2023-09-19 PROCEDURE — 3066F PR DOCUMENTATION OF TREATMENT FOR NEPHROPATHY: ICD-10-PCS | Mod: CPTII,S$GLB,, | Performed by: ORTHOPAEDIC SURGERY
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
## 2023-09-27 ENCOUNTER — OFFICE VISIT (OUTPATIENT)
Dept: URGENT CARE | Facility: CLINIC | Age: 48
End: 2023-09-27
Payer: MEDICAID

## 2023-09-27 VITALS
HEART RATE: 46 BPM | TEMPERATURE: 97 F | DIASTOLIC BLOOD PRESSURE: 101 MMHG | HEIGHT: 74 IN | RESPIRATION RATE: 18 BRPM | WEIGHT: 225 LBS | BODY MASS INDEX: 28.88 KG/M2 | OXYGEN SATURATION: 97 % | SYSTOLIC BLOOD PRESSURE: 146 MMHG

## 2023-09-27 DIAGNOSIS — M54.50 THORACOLUMBAR BACK PAIN: Primary | ICD-10-CM

## 2023-09-27 DIAGNOSIS — M54.6 THORACOLUMBAR BACK PAIN: Primary | ICD-10-CM

## 2023-09-27 PROCEDURE — 99213 OFFICE O/P EST LOW 20 MIN: CPT | Mod: S$GLB,,, | Performed by: FAMILY MEDICINE

## 2023-09-27 PROCEDURE — 99213 PR OFFICE/OUTPT VISIT, EST, LEVL III, 20-29 MIN: ICD-10-PCS | Mod: S$GLB,,, | Performed by: FAMILY MEDICINE

## 2023-09-27 RX ORDER — KETOROLAC TROMETHAMINE 30 MG/ML
30 INJECTION, SOLUTION INTRAMUSCULAR; INTRAVENOUS
Status: COMPLETED | OUTPATIENT
Start: 2023-09-27 | End: 2023-09-27

## 2023-09-27 RX ORDER — METHYLPREDNISOLONE 4 MG/1
TABLET ORAL
Qty: 1 EACH | Refills: 0 | Status: SHIPPED | OUTPATIENT
Start: 2023-09-27 | End: 2023-10-02 | Stop reason: CLARIF

## 2023-09-27 RX ADMIN — KETOROLAC TROMETHAMINE 30 MG: 30 INJECTION, SOLUTION INTRAMUSCULAR; INTRAVENOUS at 08:09

## 2023-09-27 NOTE — PROGRESS NOTES
"Subjective:      Patient ID: Augustine Boswell is a 48 y.o. male.    Vitals:  height is 6' 2" (1.88 m) and weight is 102.1 kg (225 lb). His oral temperature is 97.1 °F (36.2 °C). His blood pressure is 146/101 (abnormal) and his pulse is 46 (abnormal). His respiration is 18 and oxygen saturation is 97%.     Chief Complaint: Back Pain    Pt. Presents with lower back pain, more on right side. Complains of tightness since Monday that worsened yesterday, when lifted right leg heard something pop. No known injuries. Treatments taken ibuprofen with no relief. Pain 9/10.    Back Pain  This is a new problem. The current episode started in the past 7 days. The problem occurs constantly. The problem has been gradually worsening since onset. The quality of the pain is described as stabbing and aching. The pain is at a severity of 9/10. The pain is severe. The pain is The same all the time. The symptoms are aggravated by bending and sitting. Stiffness is present All day. He has tried NSAIDs for the symptoms. The treatment provided no relief.       Musculoskeletal:  Positive for back pain.      Objective:     Physical Exam   Musculoskeletal:         General: Tenderness present.      Lumbar back: He exhibits decreased range of motion, tenderness and spasm.        Back:        Assessment:     1. Thoracolumbar back pain        Plan:       Thoracolumbar back pain    Other orders  -     methylPREDNISolone (MEDROL DOSEPACK) 4 mg tablet; Dispense one michele. use as directed  Dispense: 1 each; Refill: 0  -     ketorolac injection 30 mg                    "

## 2023-10-03 ENCOUNTER — PATIENT MESSAGE (OUTPATIENT)
Dept: SURGERY | Facility: HOSPITAL | Age: 48
End: 2023-10-03

## 2023-10-03 DIAGNOSIS — M77.12 LATERAL EPICONDYLITIS, LEFT ELBOW: ICD-10-CM

## 2023-10-03 DIAGNOSIS — S56.512A PARTIAL TEAR OF COMMON EXTENSOR TENDON OF LEFT ELBOW: ICD-10-CM

## 2023-10-03 DIAGNOSIS — M77.11 LATERAL EPICONDYLITIS, RIGHT ELBOW: Primary | ICD-10-CM

## 2023-10-03 RX ORDER — OXYCODONE AND ACETAMINOPHEN 7.5; 325 MG/1; MG/1
1 TABLET ORAL EVERY 6 HOURS PRN
Qty: 28 TABLET | Refills: 0 | Status: SHIPPED | OUTPATIENT
Start: 2023-10-03 | End: 2023-10-03

## 2023-10-03 RX ORDER — NAPROXEN 500 MG/1
500 TABLET ORAL 2 TIMES DAILY WITH MEALS
Qty: 60 TABLET | Refills: 0 | Status: SHIPPED | OUTPATIENT
Start: 2023-10-03 | End: 2023-11-02

## 2023-10-03 RX ORDER — TRAMADOL HYDROCHLORIDE 50 MG/1
50 TABLET ORAL EVERY 6 HOURS PRN
Qty: 28 TABLET | Refills: 0 | Status: SHIPPED | OUTPATIENT
Start: 2023-10-03 | End: 2024-01-31

## 2023-10-03 RX ORDER — TRAMADOL HYDROCHLORIDE 50 MG/1
50 TABLET ORAL EVERY 6 HOURS PRN
Qty: 28 TABLET | Refills: 0 | Status: CANCELLED | OUTPATIENT
Start: 2023-10-03 | End: 2023-10-10

## 2023-10-03 NOTE — TELEPHONE ENCOUNTER
Hey this patient can't have narcotics to do past history. Requesting non narcotic alternative. Pended naproxen and tramadol.

## 2023-10-04 ENCOUNTER — HOSPITAL ENCOUNTER (OUTPATIENT)
Facility: HOSPITAL | Age: 48
Discharge: HOME | End: 2023-10-04
Attending: ORTHOPAEDIC SURGERY | Admitting: ORTHOPAEDIC SURGERY
Payer: MEDICAID

## 2023-10-04 ENCOUNTER — ANESTHESIA (OUTPATIENT)
Dept: SURGERY | Facility: HOSPITAL | Age: 48
End: 2023-10-04
Payer: MEDICAID

## 2023-10-04 ENCOUNTER — ANESTHESIA EVENT (OUTPATIENT)
Dept: SURGERY | Facility: HOSPITAL | Age: 48
End: 2023-10-04
Payer: MEDICAID

## 2023-10-04 VITALS
DIASTOLIC BLOOD PRESSURE: 78 MMHG | TEMPERATURE: 98 F | HEART RATE: 55 BPM | RESPIRATION RATE: 16 BRPM | SYSTOLIC BLOOD PRESSURE: 120 MMHG | WEIGHT: 225 LBS | HEIGHT: 74 IN | OXYGEN SATURATION: 100 % | BODY MASS INDEX: 28.88 KG/M2

## 2023-10-04 DIAGNOSIS — M77.11 LATERAL EPICONDYLITIS, RIGHT ELBOW: Primary | ICD-10-CM

## 2023-10-04 PROCEDURE — 27201423 OPTIME MED/SURG SUP & DEVICES STERILE SUPPLY: Performed by: ORTHOPAEDIC SURGERY

## 2023-10-04 PROCEDURE — 25000003 PHARM REV CODE 250: Performed by: ANESTHESIOLOGY

## 2023-10-04 PROCEDURE — 37000009 HC ANESTHESIA EA ADD 15 MINS: Performed by: ORTHOPAEDIC SURGERY

## 2023-10-04 PROCEDURE — 24358 PR TENOTOMY ELBOW LATERAL/MEDIAL DEBRIDE OPEN: ICD-10-PCS | Mod: RT,,, | Performed by: ORTHOPAEDIC SURGERY

## 2023-10-04 PROCEDURE — 25000003 PHARM REV CODE 250: Performed by: NURSE ANESTHETIST, CERTIFIED REGISTERED

## 2023-10-04 PROCEDURE — 71000015 HC POSTOP RECOV 1ST HR: Performed by: ORTHOPAEDIC SURGERY

## 2023-10-04 PROCEDURE — 24358 REPAIR ELBOW W/DEB OPEN: CPT | Mod: RT,,, | Performed by: ORTHOPAEDIC SURGERY

## 2023-10-04 PROCEDURE — D9220A PRA ANESTHESIA: ICD-10-PCS | Mod: CRNA,,, | Performed by: NURSE ANESTHETIST, CERTIFIED REGISTERED

## 2023-10-04 PROCEDURE — 36000706: Performed by: ORTHOPAEDIC SURGERY

## 2023-10-04 PROCEDURE — 71000033 HC RECOVERY, INTIAL HOUR: Performed by: ORTHOPAEDIC SURGERY

## 2023-10-04 PROCEDURE — D9220A PRA ANESTHESIA: ICD-10-PCS | Mod: ANES,,, | Performed by: ANESTHESIOLOGY

## 2023-10-04 PROCEDURE — 36000707: Performed by: ORTHOPAEDIC SURGERY

## 2023-10-04 PROCEDURE — D9220A PRA ANESTHESIA: Mod: CRNA,,, | Performed by: NURSE ANESTHETIST, CERTIFIED REGISTERED

## 2023-10-04 PROCEDURE — 37000008 HC ANESTHESIA 1ST 15 MINUTES: Performed by: ORTHOPAEDIC SURGERY

## 2023-10-04 PROCEDURE — 63600175 PHARM REV CODE 636 W HCPCS: Performed by: NURSE ANESTHETIST, CERTIFIED REGISTERED

## 2023-10-04 PROCEDURE — 25000003 PHARM REV CODE 250: Performed by: ORTHOPAEDIC SURGERY

## 2023-10-04 PROCEDURE — 71000039 HC RECOVERY, EACH ADD'L HOUR: Performed by: ORTHOPAEDIC SURGERY

## 2023-10-04 PROCEDURE — 25000003 PHARM REV CODE 250: Performed by: STUDENT IN AN ORGANIZED HEALTH CARE EDUCATION/TRAINING PROGRAM

## 2023-10-04 PROCEDURE — D9220A PRA ANESTHESIA: Mod: ANES,,, | Performed by: ANESTHESIOLOGY

## 2023-10-04 RX ORDER — DEXMEDETOMIDINE HYDROCHLORIDE 100 UG/ML
25 INJECTION, SOLUTION INTRAVENOUS ONCE
Status: DISCONTINUED | OUTPATIENT
Start: 2023-10-04 | End: 2023-10-04 | Stop reason: HOSPADM

## 2023-10-04 RX ORDER — ONDANSETRON HYDROCHLORIDE 2 MG/ML
INJECTION, SOLUTION INTRAMUSCULAR; INTRAVENOUS
Status: DISCONTINUED | OUTPATIENT
Start: 2023-10-04 | End: 2023-10-04

## 2023-10-04 RX ORDER — KETAMINE HYDROCHLORIDE 50 MG/ML
INJECTION, SOLUTION INTRAMUSCULAR; INTRAVENOUS
Status: DISCONTINUED | OUTPATIENT
Start: 2023-10-04 | End: 2023-10-04

## 2023-10-04 RX ORDER — ACETAMINOPHEN 10 MG/ML
INJECTION, SOLUTION INTRAVENOUS
Status: DISCONTINUED | OUTPATIENT
Start: 2023-10-04 | End: 2023-10-04

## 2023-10-04 RX ORDER — DIPHENHYDRAMINE HYDROCHLORIDE 50 MG/ML
12.5 INJECTION INTRAMUSCULAR; INTRAVENOUS
Status: DISCONTINUED | OUTPATIENT
Start: 2023-10-04 | End: 2023-10-04 | Stop reason: HOSPADM

## 2023-10-04 RX ORDER — DEXMEDETOMIDINE HYDROCHLORIDE 100 UG/ML
INJECTION, SOLUTION INTRAVENOUS
Status: DISCONTINUED | OUTPATIENT
Start: 2023-10-04 | End: 2023-10-04

## 2023-10-04 RX ORDER — KETOROLAC TROMETHAMINE 30 MG/ML
30 INJECTION, SOLUTION INTRAMUSCULAR; INTRAVENOUS ONCE
Status: DISCONTINUED | OUTPATIENT
Start: 2023-10-04 | End: 2023-10-04 | Stop reason: HOSPADM

## 2023-10-04 RX ORDER — HYDROMORPHONE HYDROCHLORIDE 1 MG/ML
0.2 INJECTION, SOLUTION INTRAMUSCULAR; INTRAVENOUS; SUBCUTANEOUS EVERY 5 MIN PRN
Status: DISCONTINUED | OUTPATIENT
Start: 2023-10-04 | End: 2023-10-04 | Stop reason: HOSPADM

## 2023-10-04 RX ORDER — ROCURONIUM BROMIDE 10 MG/ML
INJECTION, SOLUTION INTRAVENOUS
Status: DISCONTINUED | OUTPATIENT
Start: 2023-10-04 | End: 2023-10-04

## 2023-10-04 RX ORDER — FAMOTIDINE 10 MG/ML
INJECTION INTRAVENOUS
Status: DISCONTINUED | OUTPATIENT
Start: 2023-10-04 | End: 2023-10-04

## 2023-10-04 RX ORDER — ONDANSETRON 2 MG/ML
4 INJECTION INTRAMUSCULAR; INTRAVENOUS DAILY PRN
Status: DISCONTINUED | OUTPATIENT
Start: 2023-10-04 | End: 2023-10-04 | Stop reason: HOSPADM

## 2023-10-04 RX ORDER — BUPIVACAINE HYDROCHLORIDE AND EPINEPHRINE 5; 5 MG/ML; UG/ML
INJECTION, SOLUTION EPIDURAL; INTRACAUDAL; PERINEURAL
Status: DISCONTINUED | OUTPATIENT
Start: 2023-10-04 | End: 2023-10-04 | Stop reason: HOSPADM

## 2023-10-04 RX ORDER — LIDOCAINE HYDROCHLORIDE 20 MG/ML
INJECTION, SOLUTION EPIDURAL; INFILTRATION; INTRACAUDAL; PERINEURAL
Status: DISCONTINUED | OUTPATIENT
Start: 2023-10-04 | End: 2023-10-04

## 2023-10-04 RX ORDER — PROPOFOL 10 MG/ML
VIAL (ML) INTRAVENOUS
Status: DISCONTINUED | OUTPATIENT
Start: 2023-10-04 | End: 2023-10-04

## 2023-10-04 RX ORDER — OXYCODONE HYDROCHLORIDE 5 MG/1
5 TABLET ORAL
Status: DISCONTINUED | OUTPATIENT
Start: 2023-10-04 | End: 2023-10-04 | Stop reason: HOSPADM

## 2023-10-04 RX ORDER — SUCCINYLCHOLINE CHLORIDE 20 MG/ML
INJECTION INTRAMUSCULAR; INTRAVENOUS
Status: DISCONTINUED | OUTPATIENT
Start: 2023-10-04 | End: 2023-10-04

## 2023-10-04 RX ADMIN — LIDOCAINE HYDROCHLORIDE 50 MG: 20 INJECTION, SOLUTION INTRAVENOUS at 10:10

## 2023-10-04 RX ADMIN — PROPOFOL 250 MG: 10 INJECTION, EMULSION INTRAVENOUS at 10:10

## 2023-10-04 RX ADMIN — ACETAMINOPHEN 1000 MG: 10 INJECTION, SOLUTION INTRAVENOUS at 10:10

## 2023-10-04 RX ADMIN — SODIUM CHLORIDE, SODIUM LACTATE, POTASSIUM CHLORIDE, AND CALCIUM CHLORIDE: .6; .31; .03; .02 INJECTION, SOLUTION INTRAVENOUS at 10:10

## 2023-10-04 RX ADMIN — ONDANSETRON 4 MG: 2 INJECTION INTRAMUSCULAR; INTRAVENOUS at 10:10

## 2023-10-04 RX ADMIN — SODIUM CHLORIDE 2 G: 9 INJECTION, SOLUTION INTRAVENOUS at 10:10

## 2023-10-04 RX ADMIN — DEXMEDETOMIDINE HYDROCHLORIDE 25 MCG: 100 INJECTION, SOLUTION INTRAVENOUS at 10:10

## 2023-10-04 RX ADMIN — Medication 200 MG: at 10:10

## 2023-10-04 RX ADMIN — ROCURONIUM BROMIDE 10 MG: 10 INJECTION, SOLUTION INTRAVENOUS at 10:10

## 2023-10-04 RX ADMIN — KETAMINE HYDROCHLORIDE 25 MG: 50 INJECTION INTRAMUSCULAR; INTRAVENOUS at 11:10

## 2023-10-04 RX ADMIN — GLYCOPYRROLATE 0.4 MG: 0.2 INJECTION, SOLUTION INTRAMUSCULAR; INTRAVITREAL at 12:10

## 2023-10-04 RX ADMIN — GLYCOPYRROLATE 0.2 MG: 0.2 INJECTION, SOLUTION INTRAMUSCULAR; INTRAVITREAL at 10:10

## 2023-10-04 RX ADMIN — FAMOTIDINE 20 MG: 10 INJECTION, SOLUTION INTRAVENOUS at 10:10

## 2023-10-04 RX ADMIN — OXYCODONE HYDROCHLORIDE 5 MG: 5 TABLET ORAL at 12:10

## 2023-10-04 NOTE — ANESTHESIA POSTPROCEDURE EVALUATION
Anesthesia Post Evaluation    Patient: Augustine Boswell III    Procedure(s) Performed: Procedure(s) (LRB):  RELEASE, ELBOW, LATERAL EPICONDYLE (Right)    Final Anesthesia Type: general      Patient location during evaluation: PACU  Patient participation: Yes- Able to Participate  Level of consciousness: awake and alert, oriented and awake  Post-procedure vital signs: reviewed and stable  Pain management: adequate  Airway patency: patent    PONV status at discharge: No PONV  Anesthetic complications: no      Cardiovascular status: blood pressure returned to baseline, hemodynamically stable and stable  Respiratory status: unassisted, spontaneous ventilation and room air  Hydration status: euvolemic  Follow-up not needed.          Vitals Value Taken Time   /77 10/04/23 1230   Temp 36.4 °C (97.5 °F) 10/04/23 1215   Pulse 53 10/04/23 1242   Resp 18 10/04/23 1242   SpO2 97 % 10/04/23 1242   Vitals shown include unvalidated device data.      No case tracking events are documented in the log.      Pain/Jacqueline Score: Pain Rating Prior to Med Admin: 0 (10/4/2023 12:04 PM)  Jacqueline Score: 10 (10/4/2023 12:15 PM)

## 2023-10-04 NOTE — PLAN OF CARE
1250- pt is alert and oriented breathing even unlabored with no complaints of pain. Good pulse to RUE. Dressing is clean and dry with no redness or swelling. Pt sitting up drinking fluids. 1330- pt tolerated po intake and was able to void.

## 2023-10-04 NOTE — ANESTHESIA PROCEDURE NOTES
Intubation    Date/Time: 10/4/2023 10:55 AM    Performed by: Lonnie Carvajal CRNA  Authorized by: Ricardo Pineda MD    Intubation:     Induction:  Intravenous    Intubated:  Postinduction    Mask Ventilation:  Easy mask    Attempts:  1    Attempted By:  CRNA    Method of Intubation:  Video laryngoscopy    Blade:  Shirley 3    Laryngeal View Grade: Grade I - full view of cords      Difficult Airway Encountered?: No      Complications:  None    Airway Device:  Oral endotracheal tube    Airway Device Size:  7.5    Style/Cuff Inflation:  Cuffed (inflated to minimal occlusive pressure)    Inflation Amount (mL):  5    Tube secured:  21    Secured at:  The lips    Placement Verified By:  Capnometry    Complicating Factors:  None    Findings Post-Intubation:  BS equal bilateral
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
## 2023-10-05 ENCOUNTER — OFFICE VISIT (OUTPATIENT)
Dept: ORTHOPEDICS | Facility: CLINIC | Age: 48
End: 2023-10-05
Payer: MEDICAID

## 2023-10-05 ENCOUNTER — DOCUMENTATION ONLY (OUTPATIENT)
Dept: REHABILITATION | Facility: HOSPITAL | Age: 48
End: 2023-10-05
Payer: MEDICAID

## 2023-10-05 VITALS — HEIGHT: 74 IN | WEIGHT: 225 LBS | BODY MASS INDEX: 28.88 KG/M2

## 2023-10-05 DIAGNOSIS — Z98.890 S/P CUBITAL TUNNEL RELEASE: Primary | ICD-10-CM

## 2023-10-05 PROCEDURE — 99024 POSTOP FOLLOW-UP VISIT: CPT | Mod: S$GLB,,, | Performed by: ORTHOPAEDIC SURGERY

## 2023-10-05 PROCEDURE — 1159F PR MEDICATION LIST DOCUMENTED IN MEDICAL RECORD: ICD-10-PCS | Mod: CPTII,S$GLB,, | Performed by: ORTHOPAEDIC SURGERY

## 2023-10-05 PROCEDURE — 3061F PR NEG MICROALBUMINURIA RESULT DOCUMENTED/REVIEW: ICD-10-PCS | Mod: CPTII,S$GLB,, | Performed by: ORTHOPAEDIC SURGERY

## 2023-10-05 PROCEDURE — 3066F NEPHROPATHY DOC TX: CPT | Mod: CPTII,S$GLB,, | Performed by: ORTHOPAEDIC SURGERY

## 2023-10-05 PROCEDURE — 1160F PR REVIEW ALL MEDS BY PRESCRIBER/CLIN PHARMACIST DOCUMENTED: ICD-10-PCS | Mod: CPTII,S$GLB,, | Performed by: ORTHOPAEDIC SURGERY

## 2023-10-05 PROCEDURE — 99024 PR POST-OP FOLLOW-UP VISIT: ICD-10-PCS | Mod: S$GLB,,, | Performed by: ORTHOPAEDIC SURGERY

## 2023-10-05 PROCEDURE — 4010F PR ACE/ARB THEARPY RXD/TAKEN: ICD-10-PCS | Mod: CPTII,S$GLB,, | Performed by: ORTHOPAEDIC SURGERY

## 2023-10-05 PROCEDURE — 3061F NEG MICROALBUMINURIA REV: CPT | Mod: CPTII,S$GLB,, | Performed by: ORTHOPAEDIC SURGERY

## 2023-10-05 PROCEDURE — 4010F ACE/ARB THERAPY RXD/TAKEN: CPT | Mod: CPTII,S$GLB,, | Performed by: ORTHOPAEDIC SURGERY

## 2023-10-05 PROCEDURE — 1160F RVW MEDS BY RX/DR IN RCRD: CPT | Mod: CPTII,S$GLB,, | Performed by: ORTHOPAEDIC SURGERY

## 2023-10-05 PROCEDURE — 1159F MED LIST DOCD IN RCRD: CPT | Mod: CPTII,S$GLB,, | Performed by: ORTHOPAEDIC SURGERY

## 2023-10-05 PROCEDURE — 3066F PR DOCUMENTATION OF TREATMENT FOR NEPHROPATHY: ICD-10-PCS | Mod: CPTII,S$GLB,, | Performed by: ORTHOPAEDIC SURGERY

## 2023-10-05 RX ORDER — OXYCODONE AND ACETAMINOPHEN 7.5; 325 MG/1; MG/1
TABLET ORAL
COMMUNITY
Start: 2023-10-03 | End: 2024-01-31

## 2023-10-05 NOTE — PROGRESS NOTES
Occupational Therapy Discharge Note    10/5/2023    Pt was referred by Dr. Gonsalves and seen for initial evaluation on 8/1/2023 for R medial epicondylitis. Pt was seen for 5  OT visits for treatment of R elbow pain, decreased  /IADL. Please see last note, dated 0/29/2023,  for last objective measures as well as goal achievements/modifications. Pt has been scheduled for surgery. Current status is not known.     Discharge OT services to [facility]    NISA Wylie CHT
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
## 2023-10-18 ENCOUNTER — OFFICE VISIT (OUTPATIENT)
Dept: ORTHOPEDICS | Facility: CLINIC | Age: 48
End: 2023-10-18
Payer: MEDICAID

## 2023-10-18 VITALS — HEIGHT: 74 IN | BODY MASS INDEX: 28.88 KG/M2 | WEIGHT: 225 LBS

## 2023-10-18 DIAGNOSIS — M77.01 MEDIAL EPICONDYLITIS OF RIGHT ELBOW: ICD-10-CM

## 2023-10-18 DIAGNOSIS — Z98.890 HISTORY OF ELBOW SURGERY: Primary | ICD-10-CM

## 2023-10-18 PROCEDURE — 3061F PR NEG MICROALBUMINURIA RESULT DOCUMENTED/REVIEW: ICD-10-PCS | Mod: CPTII,S$GLB,, | Performed by: PHYSICIAN ASSISTANT

## 2023-10-18 PROCEDURE — 99024 POSTOP FOLLOW-UP VISIT: CPT | Mod: S$GLB,POP,, | Performed by: PHYSICIAN ASSISTANT

## 2023-10-18 PROCEDURE — 4010F ACE/ARB THERAPY RXD/TAKEN: CPT | Mod: CPTII,S$GLB,, | Performed by: PHYSICIAN ASSISTANT

## 2023-10-18 PROCEDURE — 3066F PR DOCUMENTATION OF TREATMENT FOR NEPHROPATHY: ICD-10-PCS | Mod: CPTII,S$GLB,, | Performed by: PHYSICIAN ASSISTANT

## 2023-10-18 PROCEDURE — 1160F PR REVIEW ALL MEDS BY PRESCRIBER/CLIN PHARMACIST DOCUMENTED: ICD-10-PCS | Mod: CPTII,S$GLB,, | Performed by: PHYSICIAN ASSISTANT

## 2023-10-18 PROCEDURE — 1160F RVW MEDS BY RX/DR IN RCRD: CPT | Mod: CPTII,S$GLB,, | Performed by: PHYSICIAN ASSISTANT

## 2023-10-18 PROCEDURE — 99024 PR POST-OP FOLLOW-UP VISIT: ICD-10-PCS | Mod: S$GLB,POP,, | Performed by: PHYSICIAN ASSISTANT

## 2023-10-18 PROCEDURE — 3061F NEG MICROALBUMINURIA REV: CPT | Mod: CPTII,S$GLB,, | Performed by: PHYSICIAN ASSISTANT

## 2023-10-18 PROCEDURE — 1159F PR MEDICATION LIST DOCUMENTED IN MEDICAL RECORD: ICD-10-PCS | Mod: CPTII,S$GLB,, | Performed by: PHYSICIAN ASSISTANT

## 2023-10-18 PROCEDURE — 1159F MED LIST DOCD IN RCRD: CPT | Mod: CPTII,S$GLB,, | Performed by: PHYSICIAN ASSISTANT

## 2023-10-18 PROCEDURE — 3066F NEPHROPATHY DOC TX: CPT | Mod: CPTII,S$GLB,, | Performed by: PHYSICIAN ASSISTANT

## 2023-10-18 PROCEDURE — 4010F PR ACE/ARB THEARPY RXD/TAKEN: ICD-10-PCS | Mod: CPTII,S$GLB,, | Performed by: PHYSICIAN ASSISTANT

## 2023-10-18 RX ORDER — TRIAMCINOLONE ACETONIDE 40 MG/ML
40 INJECTION, SUSPENSION INTRA-ARTICULAR; INTRAMUSCULAR
Status: DISCONTINUED | OUTPATIENT
Start: 2023-10-18 | End: 2023-10-18 | Stop reason: HOSPADM

## 2023-10-18 RX ADMIN — TRIAMCINOLONE ACETONIDE 40 MG: 40 INJECTION, SUSPENSION INTRA-ARTICULAR; INTRAMUSCULAR at 09:10

## 2023-10-18 NOTE — PROCEDURES
Tendon Origin: R elbow    Date/Time: 10/18/2023 9:00 AM    Performed by: Seamus Dougherty PA-C  Authorized by: Seamus Dougherty PA-C    Consent Done?:  Yes (Verbal)  Timeout: prior to procedure the correct patient, procedure, and site was verified    Indications:  Pain  Site marked: the procedure site was marked    Timeout: prior to procedure the correct patient, procedure, and site was verified    Location:  Elbow  Site:  R elbow  Prep: patient was prepped and draped in usual sterile fashion    Needle size:  25 G  Medications:  40 mg triamcinolone acetonide 40 mg/mL  Patient tolerance:  Patient tolerated the procedure well with no immediate complications
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
## 2023-11-14 ENCOUNTER — OFFICE VISIT (OUTPATIENT)
Dept: ORTHOPEDICS | Facility: CLINIC | Age: 48
End: 2023-11-14
Payer: MEDICAID

## 2023-11-14 VITALS — HEIGHT: 74 IN | WEIGHT: 225 LBS | BODY MASS INDEX: 28.88 KG/M2

## 2023-11-14 DIAGNOSIS — Z98.890 HISTORY OF ELBOW SURGERY: Primary | ICD-10-CM

## 2023-11-14 PROCEDURE — 3061F PR NEG MICROALBUMINURIA RESULT DOCUMENTED/REVIEW: ICD-10-PCS | Mod: CPTII,S$GLB,, | Performed by: ORTHOPAEDIC SURGERY

## 2023-11-14 PROCEDURE — 3066F PR DOCUMENTATION OF TREATMENT FOR NEPHROPATHY: ICD-10-PCS | Mod: CPTII,S$GLB,, | Performed by: ORTHOPAEDIC SURGERY

## 2023-11-14 PROCEDURE — 4010F PR ACE/ARB THEARPY RXD/TAKEN: ICD-10-PCS | Mod: CPTII,S$GLB,, | Performed by: ORTHOPAEDIC SURGERY

## 2023-11-14 PROCEDURE — 99024 PR POST-OP FOLLOW-UP VISIT: ICD-10-PCS | Mod: S$GLB,,, | Performed by: ORTHOPAEDIC SURGERY

## 2023-11-14 PROCEDURE — 1159F MED LIST DOCD IN RCRD: CPT | Mod: CPTII,S$GLB,, | Performed by: ORTHOPAEDIC SURGERY

## 2023-11-14 PROCEDURE — 1160F PR REVIEW ALL MEDS BY PRESCRIBER/CLIN PHARMACIST DOCUMENTED: ICD-10-PCS | Mod: CPTII,S$GLB,, | Performed by: ORTHOPAEDIC SURGERY

## 2023-11-14 PROCEDURE — 1160F RVW MEDS BY RX/DR IN RCRD: CPT | Mod: CPTII,S$GLB,, | Performed by: ORTHOPAEDIC SURGERY

## 2023-11-14 PROCEDURE — 4010F ACE/ARB THERAPY RXD/TAKEN: CPT | Mod: CPTII,S$GLB,, | Performed by: ORTHOPAEDIC SURGERY

## 2023-11-14 PROCEDURE — 99024 POSTOP FOLLOW-UP VISIT: CPT | Mod: S$GLB,,, | Performed by: ORTHOPAEDIC SURGERY

## 2023-11-14 PROCEDURE — 3066F NEPHROPATHY DOC TX: CPT | Mod: CPTII,S$GLB,, | Performed by: ORTHOPAEDIC SURGERY

## 2023-11-14 PROCEDURE — 1159F PR MEDICATION LIST DOCUMENTED IN MEDICAL RECORD: ICD-10-PCS | Mod: CPTII,S$GLB,, | Performed by: ORTHOPAEDIC SURGERY

## 2023-11-14 PROCEDURE — 3061F NEG MICROALBUMINURIA REV: CPT | Mod: CPTII,S$GLB,, | Performed by: ORTHOPAEDIC SURGERY
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
## 2023-12-27 ENCOUNTER — OFFICE VISIT (OUTPATIENT)
Dept: FAMILY MEDICINE | Facility: CLINIC | Age: 48
End: 2023-12-27
Payer: MEDICAID

## 2023-12-27 ENCOUNTER — HOSPITAL ENCOUNTER (OUTPATIENT)
Dept: RADIOLOGY | Facility: HOSPITAL | Age: 48
Discharge: HOME | End: 2023-12-27
Attending: NURSE PRACTITIONER
Payer: MEDICAID

## 2023-12-27 VITALS
BODY MASS INDEX: 28.21 KG/M2 | HEIGHT: 74 IN | SYSTOLIC BLOOD PRESSURE: 132 MMHG | WEIGHT: 219.81 LBS | HEART RATE: 59 BPM | OXYGEN SATURATION: 98 % | TEMPERATURE: 98 F | RESPIRATION RATE: 16 BRPM | DIASTOLIC BLOOD PRESSURE: 84 MMHG

## 2023-12-27 DIAGNOSIS — M46.1 SACROILIITIS: Primary | ICD-10-CM

## 2023-12-27 DIAGNOSIS — M54.50 ACUTE RIGHT-SIDED LOW BACK PAIN, UNSPECIFIED WHETHER SCIATICA PRESENT: ICD-10-CM

## 2023-12-27 DIAGNOSIS — M46.1 SACROILIITIS: ICD-10-CM

## 2023-12-27 PROCEDURE — 99214 OFFICE O/P EST MOD 30 MIN: CPT | Mod: ,,, | Performed by: NURSE PRACTITIONER

## 2023-12-27 PROCEDURE — 1159F MED LIST DOCD IN RCRD: CPT | Mod: CPTII,,, | Performed by: NURSE PRACTITIONER

## 2023-12-27 PROCEDURE — 3075F SYST BP GE 130 - 139MM HG: CPT | Mod: CPTII,,, | Performed by: NURSE PRACTITIONER

## 2023-12-27 PROCEDURE — 3061F PR NEG MICROALBUMINURIA RESULT DOCUMENTED/REVIEW: ICD-10-PCS | Mod: CPTII,,, | Performed by: NURSE PRACTITIONER

## 2023-12-27 PROCEDURE — 3066F NEPHROPATHY DOC TX: CPT | Mod: CPTII,,, | Performed by: NURSE PRACTITIONER

## 2023-12-27 PROCEDURE — 1159F PR MEDICATION LIST DOCUMENTED IN MEDICAL RECORD: ICD-10-PCS | Mod: CPTII,,, | Performed by: NURSE PRACTITIONER

## 2023-12-27 PROCEDURE — 3066F PR DOCUMENTATION OF TREATMENT FOR NEPHROPATHY: ICD-10-PCS | Mod: CPTII,,, | Performed by: NURSE PRACTITIONER

## 2023-12-27 PROCEDURE — 3079F DIAST BP 80-89 MM HG: CPT | Mod: CPTII,,, | Performed by: NURSE PRACTITIONER

## 2023-12-27 PROCEDURE — 4010F PR ACE/ARB THEARPY RXD/TAKEN: ICD-10-PCS | Mod: CPTII,,, | Performed by: NURSE PRACTITIONER

## 2023-12-27 PROCEDURE — 4010F ACE/ARB THERAPY RXD/TAKEN: CPT | Mod: CPTII,,, | Performed by: NURSE PRACTITIONER

## 2023-12-27 PROCEDURE — 3008F PR BODY MASS INDEX (BMI) DOCUMENTED: ICD-10-PCS | Mod: CPTII,,, | Performed by: NURSE PRACTITIONER

## 2023-12-27 PROCEDURE — 3079F PR MOST RECENT DIASTOLIC BLOOD PRESSURE 80-89 MM HG: ICD-10-PCS | Mod: CPTII,,, | Performed by: NURSE PRACTITIONER

## 2023-12-27 PROCEDURE — 3061F NEG MICROALBUMINURIA REV: CPT | Mod: CPTII,,, | Performed by: NURSE PRACTITIONER

## 2023-12-27 PROCEDURE — 1160F PR REVIEW ALL MEDS BY PRESCRIBER/CLIN PHARMACIST DOCUMENTED: ICD-10-PCS | Mod: CPTII,,, | Performed by: NURSE PRACTITIONER

## 2023-12-27 PROCEDURE — 3075F PR MOST RECENT SYSTOLIC BLOOD PRESS GE 130-139MM HG: ICD-10-PCS | Mod: CPTII,,, | Performed by: NURSE PRACTITIONER

## 2023-12-27 PROCEDURE — 1160F RVW MEDS BY RX/DR IN RCRD: CPT | Mod: CPTII,,, | Performed by: NURSE PRACTITIONER

## 2023-12-27 PROCEDURE — 72220 X-RAY EXAM SACRUM TAILBONE: CPT | Mod: TC

## 2023-12-27 PROCEDURE — 99214 PR OFFICE/OUTPT VISIT, EST, LEVL IV, 30-39 MIN: ICD-10-PCS | Mod: ,,, | Performed by: NURSE PRACTITIONER

## 2023-12-27 PROCEDURE — 3008F BODY MASS INDEX DOCD: CPT | Mod: CPTII,,, | Performed by: NURSE PRACTITIONER

## 2023-12-27 RX ORDER — DEXAMETHASONE SODIUM PHOSPHATE 4 MG/ML
8 INJECTION, SOLUTION INTRA-ARTICULAR; INTRALESIONAL; INTRAMUSCULAR; INTRAVENOUS; SOFT TISSUE
Status: DISCONTINUED | OUTPATIENT
Start: 2023-12-27 | End: 2024-01-31

## 2023-12-27 RX ORDER — IBUPROFEN 800 MG/1
800 TABLET ORAL 3 TIMES DAILY PRN
Qty: 24 TABLET | Refills: 0 | Status: SHIPPED | OUTPATIENT
Start: 2023-12-27 | End: 2024-01-31
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
## 2023-12-29 ENCOUNTER — PATIENT MESSAGE (OUTPATIENT)
Dept: FAMILY MEDICINE | Facility: CLINIC | Age: 48
End: 2023-12-29
Payer: MEDICAID

## 2023-12-29 ENCOUNTER — HOSPITAL ENCOUNTER (OUTPATIENT)
Dept: RADIOLOGY | Facility: HOSPITAL | Age: 48
Discharge: HOME | End: 2023-12-29
Attending: NURSE PRACTITIONER
Payer: MEDICAID

## 2023-12-29 DIAGNOSIS — M54.50 ACUTE RIGHT-SIDED LOW BACK PAIN, UNSPECIFIED WHETHER SCIATICA PRESENT: ICD-10-CM

## 2023-12-29 DIAGNOSIS — M46.1 SACROILIITIS: ICD-10-CM

## 2023-12-29 PROCEDURE — 72170 X-RAY EXAM OF PELVIS: CPT | Mod: TC

## 2024-01-03 ENCOUNTER — TELEPHONE (OUTPATIENT)
Dept: FAMILY MEDICINE | Facility: CLINIC | Age: 49
End: 2024-01-03
Payer: MEDICAID

## 2024-01-03 NOTE — TELEPHONE ENCOUNTER
----- Message from Terry Greenfield NP sent at 1/2/2024  8:50 AM CST -----  Please call patient.  X-ray of pelvis was normal.

## 2024-01-09 ENCOUNTER — OFFICE VISIT (OUTPATIENT)
Dept: URGENT CARE | Facility: CLINIC | Age: 49
End: 2024-01-09
Payer: MEDICAID

## 2024-01-09 VITALS
SYSTOLIC BLOOD PRESSURE: 141 MMHG | WEIGHT: 210 LBS | OXYGEN SATURATION: 98 % | HEART RATE: 63 BPM | BODY MASS INDEX: 26.95 KG/M2 | DIASTOLIC BLOOD PRESSURE: 84 MMHG | RESPIRATION RATE: 18 BRPM | TEMPERATURE: 98 F | HEIGHT: 74 IN

## 2024-01-09 DIAGNOSIS — R19.7 DIARRHEA, UNSPECIFIED TYPE: ICD-10-CM

## 2024-01-09 DIAGNOSIS — R10.9 ABDOMINAL CRAMPS: Primary | ICD-10-CM

## 2024-01-09 PROCEDURE — 99213 OFFICE O/P EST LOW 20 MIN: CPT | Mod: S$GLB,,, | Performed by: PHYSICIAN ASSISTANT

## 2024-01-09 RX ORDER — SUCRALFATE 1 G/1
1 TABLET ORAL 4 TIMES DAILY
Qty: 60 TABLET | Refills: 0 | Status: SHIPPED | OUTPATIENT
Start: 2024-01-09

## 2024-01-09 RX ORDER — DICYCLOMINE HYDROCHLORIDE 20 MG/1
20 TABLET ORAL EVERY 6 HOURS
Qty: 60 TABLET | Refills: 0 | Status: SHIPPED | OUTPATIENT
Start: 2024-01-09

## 2024-01-09 NOTE — LETTER
January 9, 2024      [facility] [place] [address], [address]  [facility] [address]  Phone: [phone]  Fax: [phone]       Patient: Augustine Boswell   YOB: 1975  Date of Visit: 01/09/2024    To Whom It May Concern:    Grace Boswell  was at [facility] on 01/09/2024. Please excuse patient for days missed from work. The patient may return to work on 01/12/2024. If you have any questions or concerns, or if I can be of further assistance, please do not hesitate to contact me.    Sincerely,      Magaly Martel PA-C

## 2024-01-09 NOTE — PROGRESS NOTES
"Subjective:      Patient ID: Augustine Boswell III is a 48 y.o. male.    Vitals:  height is 6' 2" (1.88 m) and weight is 95.3 kg (210 lb). His oral temperature is 98.4 °F (36.9 °C). His blood pressure is 141/84 (abnormal) and his pulse is 63. His respiration is 18 and oxygen saturation is 98%.     Chief Complaint: Diarrhea    Patient reports starting Saturday night with stomach bloating and burping with diarrhea through the night. Patient he reports getting better through the weekend but states Sunday night the symptoms returned and worsened. He has taken a zofran with mild relief. He reports sore pain in the stomach area 2/10. Patient reports about 10 BMS in about 3-4 hour span at the worse. Patient reports no new changes in meds or diet. Patient reports no recent antibiotic use. Patient reports no recent well water. Patient reports no recent travel. Patient reports no recent food contamination.    Diarrhea   This is a new problem. The current episode started in the past 7 days (3 days). The problem has been waxing and waning. The stool consistency is described as Watery. The patient states that diarrhea awakens him from sleep. Associated symptoms include abdominal pain (cramps) and increased flatus. Pertinent negatives include no arthralgias, bloating, chills, coughing, fever, headaches, myalgias, sweats, URI, vomiting or weight loss. Nothing aggravates the symptoms. There are no known risk factors. He has tried anti-motility drug and electrolyte solution for the symptoms. The treatment provided moderate relief.       Constitution: Negative for chills, sweating, fatigue and fever.   HENT:  Negative for ear pain, drooling, congestion, sore throat, trouble swallowing and voice change.    Neck: Negative for neck pain, neck stiffness, painful lymph nodes and neck swelling.   Cardiovascular:  Negative for chest pain, leg swelling, palpitations, sob on exertion and passing out.   Eyes:  Negative for eye discharge, eye " itching, eye pain, eye redness and eyelid swelling.   Respiratory:  Negative for chest tightness, cough, sputum production, bloody sputum, shortness of breath, stridor and wheezing.    Gastrointestinal:  Positive for abdominal pain (cramps), abdominal bloating, nausea and diarrhea. Negative for vomiting, constipation and heartburn.   Genitourinary:  Negative for dysuria, frequency, urgency, flank pain, hematuria and pelvic pain.   Musculoskeletal:  Negative for joint pain, joint swelling, abnormal ROM of joint, pain with walking, muscle cramps and muscle ache.   Skin:  Negative for rash and hives.   Allergic/Immunologic: Negative for hives, itching and sneezing.   Neurological:  Negative for dizziness, light-headedness, passing out, loss of balance, headaches, altered mental status, loss of consciousness, numbness and seizures.   Hematologic/Lymphatic: Negative for swollen lymph nodes.   Psychiatric/Behavioral:  Negative for altered mental status and nervous/anxious. The patient is not nervous/anxious.       Objective:     Physical Exam   Constitutional: He is oriented to person, place, and time. He appears well-developed. He is cooperative.  Non-toxic appearance. He does not appear ill. No distress.   HENT:   Head: Normocephalic and atraumatic.   Ears:   Right Ear: External ear normal.   Left Ear: External ear normal.   Nose: Nose normal.   Mouth/Throat: Uvula is midline, oropharynx is clear and moist and mucous membranes are normal.   Eyes: Conjunctivae and lids are normal. No scleral icterus.   Neck: Trachea normal and phonation normal. Neck supple. No edema present. No erythema present. No neck rigidity present.   Cardiovascular: Normal rate, regular rhythm, normal heart sounds and normal pulses.   Pulmonary/Chest: Effort normal and breath sounds normal. No accessory muscle usage or stridor. No respiratory distress. He has no decreased breath sounds. He has no rhonchi.   Abdominal: Normal appearance and bowel  sounds are normal. Soft. There is no abdominal tenderness. There is no rebound, no guarding, no tenderness at McBurney's point, negative Cuevas's sign, no left CVA tenderness and no right CVA tenderness.   Musculoskeletal: Normal range of motion.         General: No deformity. Normal range of motion.   Lymphadenopathy:     He has no cervical adenopathy.   Neurological: He is alert and oriented to person, place, and time. He exhibits normal muscle tone. Gait normal. Coordination normal.   Skin: Skin is warm, dry, intact, not diaphoretic, not pale and no rash. Capillary refill takes less than 2 seconds.   Psychiatric: His speech is normal and behavior is normal. Judgment and thought content normal.   Nursing note and vitals reviewed.    Assessment:     1. Abdominal cramps    2. Diarrhea, unspecified type        Plan:   Discussed the limitations in the urgent care setting with patient. Advised close follow-up with PCP and/or Specialist for further evaluation as needed. Strict ER precautions given to patient as well. Patient aware, verbalized understanding and agreed with plan of care.    Abdominal cramps  -     sucralfate (CARAFATE) 1 gram tablet; Take 1 tablet (1 g total) by mouth 4 (four) times daily.  Dispense: 60 tablet; Refill: 0  -     dicyclomine (BENTYL) 20 mg tablet; Take 1 tablet (20 mg total) by mouth every 6 (six) hours.  Dispense: 60 tablet; Refill: 0    Diarrhea, unspecified type  -     sucralfate (CARAFATE) 1 gram tablet; Take 1 tablet (1 g total) by mouth 4 (four) times daily.  Dispense: 60 tablet; Refill: 0  -     dicyclomine (BENTYL) 20 mg tablet; Take 1 tablet (20 mg total) by mouth every 6 (six) hours.  Dispense: 60 tablet; Refill: 0        There are no Patient Instructions on file for this visit.

## 2024-01-10 ENCOUNTER — OFFICE VISIT (OUTPATIENT)
Dept: URGENT CARE | Facility: CLINIC | Age: 49
End: 2024-01-10
Payer: MEDICAID

## 2024-01-10 ENCOUNTER — PATIENT MESSAGE (OUTPATIENT)
Dept: FAMILY MEDICINE | Facility: CLINIC | Age: 49
End: 2024-01-10
Payer: MEDICAID

## 2024-01-10 VITALS
DIASTOLIC BLOOD PRESSURE: 98 MMHG | OXYGEN SATURATION: 98 % | SYSTOLIC BLOOD PRESSURE: 149 MMHG | BODY MASS INDEX: 26.95 KG/M2 | RESPIRATION RATE: 18 BRPM | WEIGHT: 210 LBS | HEIGHT: 74 IN | HEART RATE: 62 BPM | TEMPERATURE: 98 F

## 2024-01-10 DIAGNOSIS — M54.9 UPPER BACK PAIN: Primary | ICD-10-CM

## 2024-01-10 DIAGNOSIS — M54.50 ACUTE RIGHT-SIDED LOW BACK PAIN, UNSPECIFIED WHETHER SCIATICA PRESENT: Primary | ICD-10-CM

## 2024-01-10 PROCEDURE — 99213 OFFICE O/P EST LOW 20 MIN: CPT | Mod: S$GLB,,, | Performed by: PHYSICIAN ASSISTANT

## 2024-01-10 RX ORDER — LIDOCAINE 50 MG/G
1 PATCH TOPICAL DAILY
Qty: 15 PATCH | Refills: 0 | Status: SHIPPED | OUTPATIENT
Start: 2024-01-10

## 2024-01-10 RX ORDER — METHYLPREDNISOLONE 4 MG/1
TABLET ORAL
Qty: 1 EACH | Refills: 0 | Status: SHIPPED | OUTPATIENT
Start: 2024-01-10 | End: 2024-01-31

## 2024-01-10 RX ORDER — KETOROLAC TROMETHAMINE 30 MG/ML
30 INJECTION, SOLUTION INTRAMUSCULAR; INTRAVENOUS
Status: COMPLETED | OUTPATIENT
Start: 2024-01-10 | End: 2024-01-10

## 2024-01-10 RX ADMIN — KETOROLAC TROMETHAMINE 30 MG: 30 INJECTION, SOLUTION INTRAMUSCULAR; INTRAVENOUS at 04:01

## 2024-01-10 NOTE — PROGRESS NOTES
"Subjective:      Patient ID: Augustine Boswell III is a 48 y.o. male.    Vitals:  height is 6' 2" (1.88 m) and weight is 95.3 kg (210 lb). His oral temperature is 98.2 °F (36.8 °C). His blood pressure is 149/98 (abnormal) and his pulse is 62. His respiration is 18 and oxygen saturation is 98%.     Chief Complaint: Back Pain    Pt reports to clinic with back injury onset this morning. Pt states he may of pulled muscle in upper middle of back when picking up his dog. Ibuprofen for symptoms with relief. 7/10 pain    Back Pain  This is a new problem. The current episode started today. The problem occurs intermittently. The problem has been waxing and waning since onset. Pain location: upper. The quality of the pain is described as aching. The pain does not radiate. The pain is at a severity of 5/10. The pain is moderate. The symptoms are aggravated by bending. Pertinent negatives include no abdominal pain, bladder incontinence, bowel incontinence, chest pain, dysuria, fever, headaches, leg pain, numbness, paresis, paresthesias, pelvic pain, perianal numbness, tingling, weakness or weight loss. He has tried NSAIDs for the symptoms. The treatment provided mild relief.     Constitution: Negative for chills, sweating, fatigue and fever.   HENT:  Negative for ear pain, drooling, congestion, sore throat, trouble swallowing and voice change.    Neck: Negative for neck pain, neck stiffness, painful lymph nodes and neck swelling.   Cardiovascular:  Negative for chest pain, leg swelling, palpitations, sob on exertion and passing out.   Eyes:  Negative for eye discharge, eye itching, eye pain, eye redness and eyelid swelling.   Respiratory:  Negative for chest tightness, cough, sputum production, bloody sputum, shortness of breath, stridor and wheezing.    Gastrointestinal:  Negative for abdominal pain, abdominal bloating, nausea, vomiting, constipation, diarrhea, heartburn and bowel incontinence.   Genitourinary:  Negative for " dysuria, urine decreased, bladder incontinence and pelvic pain.   Musculoskeletal:  Positive for back pain and muscle ache. Negative for joint pain, joint swelling, abnormal ROM of joint, pain with walking and muscle cramps.   Skin:  Negative for rash and hives.   Allergic/Immunologic: Negative for hives, itching and sneezing.   Neurological:  Negative for dizziness, light-headedness, passing out, facial drooping, speech difficulty, loss of balance, headaches, altered mental status, loss of consciousness, numbness, tingling and seizures.   Hematologic/Lymphatic: Negative for swollen lymph nodes.   Psychiatric/Behavioral:  Negative for altered mental status and nervous/anxious. The patient is not nervous/anxious.       Objective:     Physical Exam   Constitutional: He is oriented to person, place, and time. He appears well-developed. He is cooperative. No distress.   HENT:   Head: Normocephalic and atraumatic.   Nose: Nose normal.   Mouth/Throat: Oropharynx is clear and moist and mucous membranes are normal.   Eyes: Conjunctivae and lids are normal.   Neck: Trachea normal and phonation normal. Neck supple. No crepitus. No erythema present. decreased range of motion present.   Cardiovascular: Normal rate, regular rhythm, normal heart sounds and normal pulses.   Pulmonary/Chest: Effort normal and breath sounds normal.   Abdominal: Normal appearance and bowel sounds are normal. He exhibits no mass. Soft.   Musculoskeletal:         General: No deformity.      Cervical back: He exhibits tenderness, bony tenderness and spasm.        Back:    Neurological: He is alert and oriented to person, place, and time. He has normal sensation, normal strength and normal reflexes. No sensory deficit. Gait normal.   Skin: Skin is warm, dry, intact, not diaphoretic and no rash. Capillary refill takes less than 2 seconds.   Psychiatric: His speech is normal and behavior is normal. Judgment and thought content normal.   Nursing note and  vitals reviewed.    Assessment:     1. Upper back pain        Plan:   Discussed with patient that we do not currently have XRAY on site at this time. Advised close follow-up with PCP and/or Ortho for further evaluation as needed. Strict ER precautions given to patient as well. Patient aware, verbalized understanding and agreed with plan of care.    Upper back pain  -     ketorolac injection 30 mg  -     methylPREDNISolone (MEDROL DOSEPACK) 4 mg tablet; Use as directed on the box.  Dispense: 1 each; Refill: 0  -     LIDOcaine (LIDODERM) 5 %; Place 1 patch onto the skin once daily. Remove & Discard patch within 12 hours or as directed by MD  Dispense: 15 patch; Refill: 0      Patient Instructions   If you were prescribed a narcotic or controlled medication, do not drive or operate heavy equipment or machinery while taking these medications.  You must understand that you've received an Urgent Care treatment only and that you may be released before all your medical problems are known or treated. You, the patient, will arrange for follow up care as instructed.  Follow up with your PCP or specialty clinic as directed if not improved or as needed. You can call [phone] to schedule an appointment with the appropriate provider.  If your condition worsens we recommend that you receive another evaluation at the Emergency Department for any concerns or worsening of condition.  Patient aware and verbalized understanding.    Recommended no heavy lifting until symptoms resolve.  Medrol Dose Pack RX as prescribed for inflammation/swelling.  Lidocaine Patch RX as prescribed for pain relief as needed.  You may do gently stretching if tolerable.  Moist warm compresses to area several times daily.   Advised patient to use a Heating pad at home on LOW and Warm baths with OTC Epsom Salt as discussed - MAKE SURE YOU DO NOT FALL ASLEEP WITH a HEATING PAD ON.  Do not stay in one position too long. When sleeping on your back, place a  pillow under the knees to reduce tension on back. If sleeping on your side, place a pillow between the knees to keep spine in better alignment.   Wear supportive shoes such as tennis shoes for support of the neck and lower back during the day.  Follow-up with PCP for further evaluation as needed.  Follow-up with Ortho for further evaluation if still experiencing pain as discussed.  If you lose control of any extremity, your bowel and/or bladder, in between your legs by your genitalia and/or rectum, please go to the nearest Emergency Department immediately.  Strict ER precautions given to patient.  Patient aware and verbalized understanding.

## 2024-01-10 NOTE — PATIENT INSTRUCTIONS
If you were prescribed a narcotic or controlled medication, do not drive or operate heavy equipment or machinery while taking these medications.  You must understand that you've received an Urgent Care treatment only and that you may be released before all your medical problems are known or treated. You, the patient, will arrange for follow up care as instructed.  Follow up with your PCP or specialty clinic as directed if not improved or as needed. You can call [phone] to schedule an appointment with the appropriate provider.  If your condition worsens we recommend that you receive another evaluation at the Emergency Department for any concerns or worsening of condition.  Patient aware and verbalized understanding.    Recommended no heavy lifting until symptoms resolve.  Medrol Dose Pack RX as prescribed for inflammation/swelling.  Lidocaine Patch RX as prescribed for pain relief as needed.  You may do gently stretching if tolerable.  Moist warm compresses to area several times daily.   Advised patient to use a Heating pad at home on LOW and Warm baths with OTC Epsom Salt as discussed - MAKE SURE YOU DO NOT FALL ASLEEP WITH a HEATING PAD ON.  Do not stay in one position too long. When sleeping on your back, place a pillow under the knees to reduce tension on back. If sleeping on your side, place a pillow between the knees to keep spine in better alignment.   Wear supportive shoes such as tennis shoes for support of the neck and lower back during the day.  Follow-up with PCP for further evaluation as needed.  Follow-up with Ortho for further evaluation if still experiencing pain as discussed.  If you lose control of any extremity, your bowel and/or bladder, in between your legs by your genitalia and/or rectum, please go to the nearest Emergency Department immediately.  Strict ER precautions given to patient.  Patient aware and verbalized understanding.

## 2024-01-16 DIAGNOSIS — M54.50 ACUTE RIGHT-SIDED LOW BACK PAIN, UNSPECIFIED WHETHER SCIATICA PRESENT: Primary | ICD-10-CM

## 2024-01-16 DIAGNOSIS — M46.1 SACROILIITIS: ICD-10-CM

## 2024-01-30 ENCOUNTER — PATIENT MESSAGE (OUTPATIENT)
Dept: FAMILY MEDICINE | Facility: CLINIC | Age: 49
End: 2024-01-30
Payer: MEDICAID

## 2024-01-31 ENCOUNTER — OFFICE VISIT (OUTPATIENT)
Dept: ORTHOPEDICS | Facility: CLINIC | Age: 49
End: 2024-01-31
Payer: MEDICAID

## 2024-01-31 VITALS — WEIGHT: 210 LBS | HEIGHT: 74 IN | BODY MASS INDEX: 26.95 KG/M2

## 2024-01-31 DIAGNOSIS — R52 PAIN: Primary | ICD-10-CM

## 2024-01-31 DIAGNOSIS — M46.1 SACROILIITIS: ICD-10-CM

## 2024-01-31 DIAGNOSIS — M54.16 LUMBAR RADICULOPATHY: ICD-10-CM

## 2024-01-31 DIAGNOSIS — M51.36 DISC DEGENERATION, LUMBAR: ICD-10-CM

## 2024-01-31 DIAGNOSIS — M54.50 ACUTE RIGHT-SIDED LOW BACK PAIN, UNSPECIFIED WHETHER SCIATICA PRESENT: ICD-10-CM

## 2024-01-31 PROCEDURE — 99213 OFFICE O/P EST LOW 20 MIN: CPT | Mod: S$GLB,,, | Performed by: ORTHOPAEDIC SURGERY

## 2024-01-31 PROCEDURE — 1159F MED LIST DOCD IN RCRD: CPT | Mod: CPTII,S$GLB,, | Performed by: ORTHOPAEDIC SURGERY

## 2024-01-31 PROCEDURE — 3008F BODY MASS INDEX DOCD: CPT | Mod: CPTII,S$GLB,, | Performed by: ORTHOPAEDIC SURGERY

## 2024-01-31 PROCEDURE — 1160F RVW MEDS BY RX/DR IN RCRD: CPT | Mod: CPTII,S$GLB,, | Performed by: ORTHOPAEDIC SURGERY

## 2024-01-31 RX ORDER — AMOXICILLIN 500 MG/1
500 CAPSULE ORAL 3 TIMES DAILY
COMMUNITY
Start: 2024-01-25 | End: 2024-03-13

## 2024-01-31 RX ORDER — PIROXICAM 20 MG/1
20 CAPSULE ORAL DAILY
Qty: 30 CAPSULE | Refills: 3 | Status: SHIPPED | OUTPATIENT
Start: 2024-01-31 | End: 2024-02-01

## 2024-01-31 RX ORDER — NAPROXEN 500 MG/1
TABLET ORAL
COMMUNITY
Start: 2023-10-03 | End: 2024-01-31
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
## 2024-02-01 ENCOUNTER — TELEPHONE (OUTPATIENT)
Dept: ORTHOPEDICS | Facility: CLINIC | Age: 49
End: 2024-02-01

## 2024-02-01 DIAGNOSIS — M54.50 ACUTE RIGHT-SIDED LOW BACK PAIN, UNSPECIFIED WHETHER SCIATICA PRESENT: Primary | ICD-10-CM

## 2024-02-01 RX ORDER — MELOXICAM 15 MG/1
15 TABLET ORAL DAILY
Qty: 30 TABLET | Refills: 2 | Status: SHIPPED | OUTPATIENT
Start: 2024-02-01 | End: 2024-03-13

## 2024-02-01 NOTE — TELEPHONE ENCOUNTER
Medication that was called in yesterday is not covered by his insurance. Can we send something different?

## 2024-02-02 ENCOUNTER — CLINICAL SUPPORT (OUTPATIENT)
Dept: REHABILITATION | Facility: HOSPITAL | Age: 49
End: 2024-02-02
Payer: MEDICAID

## 2024-02-02 DIAGNOSIS — M54.16 LUMBAR RADICULOPATHY: ICD-10-CM

## 2024-02-02 DIAGNOSIS — R29.898 WEAKNESS OF BOTH LOWER EXTREMITIES: ICD-10-CM

## 2024-02-02 DIAGNOSIS — M51.36 DISC DEGENERATION, LUMBAR: ICD-10-CM

## 2024-02-02 DIAGNOSIS — M25.551 RIGHT HIP PAIN: Primary | ICD-10-CM

## 2024-02-02 PROCEDURE — 97110 THERAPEUTIC EXERCISES: CPT | Mod: PO

## 2024-02-02 PROCEDURE — 97161 PT EVAL LOW COMPLEX 20 MIN: CPT | Mod: PO
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
## 2024-02-05 ENCOUNTER — CLINICAL SUPPORT (OUTPATIENT)
Dept: REHABILITATION | Facility: HOSPITAL | Age: 49
End: 2024-02-05
Payer: MEDICAID

## 2024-02-05 DIAGNOSIS — R29.898 WEAKNESS OF BOTH LOWER EXTREMITIES: ICD-10-CM

## 2024-02-05 DIAGNOSIS — M25.551 RIGHT HIP PAIN: Primary | ICD-10-CM

## 2024-02-05 PROCEDURE — 97110 THERAPEUTIC EXERCISES: CPT | Mod: PO,CQ

## 2024-02-05 NOTE — PROGRESS NOTES
[facility]   Physical Therapy Treatment Note     Name: Augustine Bah Boswell III  Clinic Number: [id number]    Therapy Diagnosis:   Encounter Diagnoses   Name Primary?    Right hip pain Yes    Weakness of both lower extremities      Physician: Francisco Chun MD    Visit Date: 2/5/2024  Physician Orders: PT Eval and Treat  Medical Diagnosis from Referral: Disc degeneration, lumbar [M51.36], Lumbar radiculopathy [M54.16]   Evaluation Date: 2/2/2024  Authorization Period Expiration: 1/30/25  Plan of Care Expiration: 5/2/24  Progress Note Due: 3/2/24  Visit # / Visits authorized: 1/ 20  FOTO: 1/3    PTA Visit #: 1/5         Precautions: Standard     Time In: 0706 AM  Time Out: 0759 AM  Total Billable Time: 53 minutes      SUBJECTIVE     Pt reports: his back and buttock pain is better overall but he does have some pain into (R) calf that wasn't there before. Patient states his hips and hamstrings have always just been tight. He was compliant with home exercise program.  Response to previous treatment: no adverse effects   Functional change: too soon to determine     Pain: 2/10  Location: right buttocks into (R) LE      OBJECTIVE     Objective Measures updated at progress report unless specified.     Treatment     Augustine received the treatments listed below:      therapeutic exercises to develop strength, endurance, posture, core stabilization for 20 minutes including:  Upright bike x 5 minutes  Prone on elbows x 3 minutes  Supine piriformis stretch 30 sec x 3 (B)   Supine hamstring stretch with strap 30 sec x 3 (B)     neuromuscular re-education activities to improve: Balance, Coordination, Proprioception, and Posture for 33 minutes. The following activities were included:  TA activation + hip fall out (green TB) 2x10  Supine bridge + green TB 2x10  S/L clamshells (Green TB) 2x10  Prone glut set + hip extension x10 (B)   Lateral walking (Green TB at thighs) x 30 feet x 3   Standing TA  Activation (ball push down) 2x10  Standing hip abduction 2x10  SL shuttle squats 3x12, 75# (cues for valgus collapse)    Patient Education and Home Exercises     Home Exercises Provided and Patient Education Provided     Education provided:   - HEP compliance     Written Home Exercises Provided: Patient instructed to cont prior HEP. Exercises were reviewed and Augustine was able to demonstrate them prior to the end of the session.  Augustine demonstrated good  understanding of the education provided. See EMR under Patient Instructions for exercises provided during therapy sessions    ASSESSMENT     Augustine achieves training effect easily with gluteal specific programming (R>L). Patient challenged by these activities requiring rest break for muscle recovery between sets. No appreciable change in calf pain with prone one elbows but this is reproduced with supine hamstring stretching. No complaints of back or buttock pain with today's interventions.    Augustine Is progressing well towards his goals.   Pt prognosis is Good.     Pt will continue to benefit from skilled outpatient physical therapy to address the deficits listed in the problem list box on initial evaluation, provide pt/family education and to maximize pt's level of independence in the home and community environment.     Pt's spiritual, cultural and educational needs considered and pt agreeable to plan of care and goals.     Anticipated barriers to physical therapy: none    Goals:   Goals:  Short Term Goals: 4 weeks   Pt will be independent with the initial phase of their HEP in order to continue to make functional gains outside of physical therapy.  Pt's right buttock/hip pain will decrease to 4/10 even with exercise in order for him to tolerate higher level activities better.  Pt's right hip abduction strength will improve to 4/5 MMT score.  Pt's right hip extension strength will improve to 4/5 MMT score.  Pt's Modified Oswestry score will improve by 8% to  demonstrate improved function.      Long Term Goals: 8 weeks   Pt will be independent with the final phase of their HEP in order to continue to make functional gains outside of physical therapy.  Pt's right buttock/hip pain will decrease to 2/10 even with exercise in order for him to tolerate higher level activities better.  Pt's right hip abduction strength will improve to 4+/5 MMT score.  Pt's bilateral hip extension strength will improve to 4+/5 MMT score.  Pt's bilateral hip adduction strength will improve to 4/5 MMT score.    Pt's Modified Oswestry score will improve by 16% to demonstrate improved function    PLAN     Continue current POC with emphasis on spinal stability and dynamic control.     Leandra Smith, PTA

## 2024-02-14 ENCOUNTER — PATIENT MESSAGE (OUTPATIENT)
Dept: FAMILY MEDICINE | Facility: CLINIC | Age: 49
End: 2024-02-14
Payer: MEDICAID

## 2024-02-14 DIAGNOSIS — I10 ESSENTIAL HYPERTENSION: Primary | ICD-10-CM

## 2024-02-16 DIAGNOSIS — F32.A DEPRESSION, UNSPECIFIED DEPRESSION TYPE: Primary | ICD-10-CM

## 2024-02-16 RX ORDER — VENLAFAXINE HYDROCHLORIDE 75 MG/1
75 CAPSULE, EXTENDED RELEASE ORAL DAILY
Qty: 30 CAPSULE | Refills: 11 | Status: SHIPPED | OUTPATIENT
Start: 2024-02-16 | End: 2025-02-15

## 2024-02-19 ENCOUNTER — CLINICAL SUPPORT (OUTPATIENT)
Dept: REHABILITATION | Facility: HOSPITAL | Age: 49
End: 2024-02-19
Payer: MEDICAID

## 2024-02-19 DIAGNOSIS — M25.551 RIGHT HIP PAIN: Primary | ICD-10-CM

## 2024-02-19 DIAGNOSIS — R29.898 WEAKNESS OF BOTH LOWER EXTREMITIES: ICD-10-CM

## 2024-02-19 PROCEDURE — 97110 THERAPEUTIC EXERCISES: CPT | Mod: PO,CQ

## 2024-02-19 NOTE — PROGRESS NOTES
"[facility]   Physical Therapy Treatment Note     Name: Augustine Boswell [facility]  Clinic Number: [id number]    Therapy Diagnosis:   Encounter Diagnoses   Name Primary?    Right hip pain Yes    Weakness of both lower extremities      Physician: Francisco Chun MD    Visit Date: 2/19/2024  Physician Orders: PT Eval and Treat  Medical Diagnosis from Referral: Disc degeneration, lumbar [M51.36], Lumbar radiculopathy [M54.16]   Evaluation Date: 2/2/2024  Authorization Period Expiration: 1/30/25  Plan of Care Expiration: 5/2/24  Progress Note Due: 3/2/24  Visit # / Visits authorized: 3/ 20  FOTO: 1/3    PTA Visit #: 2/5         Precautions: Standard     Time In: 8:00 AM  Time Out: 9:00 AM  Total Billable Time: 30 minutes 1:1      SUBJECTIVE     Pt reports: "I have been feeling good, but man I'm hurting today". He reports increased R buttocks pain that radiates down  that started over the weekend     He was compliant with home exercise program.  Response to previous treatment: no adverse effects   Functional change: too soon to determine     Pain: 8/10  Location: right buttocks into (R) LE      OBJECTIVE     Objective Measures updated at progress report unless specified.     Treatment     Augustine received the treatments listed below:      therapeutic exercises to develop strength, endurance, posture, core stabilization for 20 minutes including:  Upright bike x 10 minutes  Prone on elbows x 3 minutes  Supine piriformis stretch 30 sec x 3 (B)  Supine sciatic nerve glides x20 5" hold   Supine hamstring stretch with strap 30 sec x 3 (B)     neuromuscular re-education activities to improve: Balance, Coordination, Proprioception, and Posture for 30 minutes. The following activities were included:  TA activation + hip fall out (green TB) 2x10  Supine bridge + green TB 2x10  S/L clamshells (Green TB) 2x10  Prone glut set + hip extension x10 (B)   Lateral walking (Green TB at thighs) x 30 feet x 3 "   Standing TA Activation (ball push down) 2x10  Standing hip abduction 2x10-NP  SL shuttle squats 3x12, 75# (cues for valgus collapse)-NP    Manual therapy techniques for 10 minutes    LAD to R LE  STM with lacrosse ball     Patient Education and Home Exercises     Home Exercises Provided and Patient Education Provided     Education provided:   - HEP compliance     Written Home Exercises Provided: Patient instructed to cont prior HEP. Exercises were reviewed and Augustine was able to demonstrate them prior to the end of the session.  Augustine demonstrated good  understanding of the education provided. See EMR under Patient Instructions for exercises provided during therapy sessions    ASSESSMENT   Augustine returned reporting elevated R buttocks pain levels and radicular pain radiating down posterior R LE. Increased time spent today on manual therapy techniques noted above in effort to decrease pain levels. Post manual techniques pt reported decreased pain levels. Following this LE flexibility and functional strengthening exercises continued with no increase in pain. Due to increased time spent on manual techniques a number of exercises were not performed today. Will look to reincorporate these exercises to continue to improve overall LE strength and mobility.    Augustine Is progressing well towards his goals.   Pt prognosis is Good.     Pt will continue to benefit from skilled outpatient physical therapy to address the deficits listed in the problem list box on initial evaluation, provide pt/family education and to maximize pt's level of independence in the home and community environment.     Pt's spiritual, cultural and educational needs considered and pt agreeable to plan of care and goals.     Anticipated barriers to physical therapy: none    Goals:   Goals:  Short Term Goals: 4 weeks   Pt will be independent with the initial phase of their HEP in order to continue to make functional gains outside of physical  therapy.  Pt's right buttock/hip pain will decrease to 4/10 even with exercise in order for him to tolerate higher level activities better.  Pt's right hip abduction strength will improve to 4/5 MMT score.  Pt's right hip extension strength will improve to 4/5 MMT score.  Pt's Modified Oswestry score will improve by 8% to demonstrate improved function.      Long Term Goals: 8 weeks   Pt will be independent with the final phase of their HEP in order to continue to make functional gains outside of physical therapy.  Pt's right buttock/hip pain will decrease to 2/10 even with exercise in order for him to tolerate higher level activities better.  Pt's right hip abduction strength will improve to 4+/5 MMT score.  Pt's bilateral hip extension strength will improve to 4+/5 MMT score.  Pt's bilateral hip adduction strength will improve to 4/5 MMT score.    Pt's Modified Oswestry score will improve by 16% to demonstrate improved function    PLAN     Continue current POC with emphasis on spinal stability and dynamic control.     Wilfrido Cobb, PTA

## 2024-02-22 ENCOUNTER — PATIENT MESSAGE (OUTPATIENT)
Dept: ORTHOPEDICS | Facility: CLINIC | Age: 49
End: 2024-02-22

## 2024-02-26 ENCOUNTER — PATIENT MESSAGE (OUTPATIENT)
Dept: ORTHOPEDICS | Facility: CLINIC | Age: 49
End: 2024-02-26

## 2024-02-26 ENCOUNTER — CLINICAL SUPPORT (OUTPATIENT)
Dept: REHABILITATION | Facility: HOSPITAL | Age: 49
End: 2024-02-26
Payer: MEDICAID

## 2024-02-26 DIAGNOSIS — R29.898 WEAKNESS OF BOTH LOWER EXTREMITIES: ICD-10-CM

## 2024-02-26 DIAGNOSIS — M25.551 RIGHT HIP PAIN: Primary | ICD-10-CM

## 2024-02-26 PROCEDURE — 97110 THERAPEUTIC EXERCISES: CPT | Mod: PO,CQ

## 2024-02-26 NOTE — PROGRESS NOTES
[facility]   Physical Therapy Treatment Note     Name: Augustine Boswell [facility]  Clinic Number: [id number]    Therapy Diagnosis:   Encounter Diagnoses   Name Primary?    Right hip pain Yes    Weakness of both lower extremities      Physician: Francisco Chun MD    Visit Date: 2/26/2024  Physician Orders: PT Eval and Treat  Medical Diagnosis from Referral: Disc degeneration, lumbar [M51.36], Lumbar radiculopathy [M54.16]   Evaluation Date: 2/2/2024  Authorization Period Expiration: 1/30/25  Plan of Care Expiration: 5/2/24  Progress Note Due: 3/2/24  Visit # / Visits authorized: 4/ 20  FOTO: 1/3    PTA Visit #: 3/5         Precautions: Standard     Time In: 0658 AM  Time Out: 0752 AM  Total Billable Time: 53 minutes    SUBJECTIVE     Pt reports: he continues with increased pain levels into (R) buttocks. Patient states that when he bends over to put on pants this pain will shoot down the leg. Patient also states he feels this shooting pain when he stands up from sitting.  He was compliant with home exercise program.  Response to previous treatment: no adverse effects   Functional change: too soon to determine     Pain: 6-7/10  Location: right buttocks into (R) LE      OBJECTIVE     (R) LE: (+) passive SLR test, neural tension within first 30 degrees if lift off.   (+) R LE slump test    Treatment     Augustine received the treatments listed below:      therapeutic exercises to develop strength, endurance, posture, core stabilization for 20 minutes including:  Upright bike x 7 minutes  Supine hamstring stretch with strap 30 sec x 3 (R only, strap around ankle to avoid tensioning sciatic nerve)  Supine piriformis stretch 30 sec x 3 (B)  Prone on elbows x 3 minutes    neuromuscular re-education activities to improve: Balance, Coordination, Proprioception, and Posture for 30 minutes. The following activities were included:  Supine sciatic nerve glides 3x10 (PTA assistance, varying ROM as neural  tension improves)  Seated LAQ within tolerance knee extension ROM 2x10 (ROM improves with repetition)  Seated: slouch to over correct 2x10, 5 sec hold in overcorrect  TA activation in unison with exhalation x 2 minutes   TA brace + march x 2 minutes  TA activation + hip fall out (green TB) 2x10  Supine bridge + green TB 2x10  S/L clamshells (Green TB) 2x10    Not performed today, resume in future sessions as neural tension improves:  Prone glut set + hip extension x10 (B)   Lateral walking (Green TB at thighs) x 30 feet x 3   Standing TA Activation (ball push down) 2x10  Standing hip abduction 2x10-NP  SL shuttle squats 3x12, 75# (cues for valgus collapse)-NP    Manual therapy techniques for 00 minutes  LAD to R LE  STM with lacrosse ball     Patient Education and Home Exercises     Home Exercises Provided and Patient Education Provided     Education provided:   - HEP compliance     Written Home Exercises Provided: Patient instructed to cont prior HEP. Exercises were reviewed and Augustine was able to demonstrate them prior to the end of the session.  Augustine demonstrated good  understanding of the education provided. See EMR under Patient Instructions for exercises provided during therapy sessions    ASSESSMENT   (+) passive SLR and slump test on (R) LE indicative of sciatic neural tension. Performed nerve glides to tolerance to improve excursion and neural mobility. Followed up with light stabilization activities to improve dynamic stability and  reduce shear forces on lumbar spine.     Augustine Is progressing well towards his goals.   Pt prognosis is Good.     Pt will continue to benefit from skilled outpatient physical therapy to address the deficits listed in the problem list box on initial evaluation, provide pt/family education and to maximize pt's level of independence in the home and community environment.     Pt's spiritual, cultural and educational needs considered and pt agreeable to plan of care and goals.      Anticipated barriers to physical therapy: none    Goals:   Goals:  Short Term Goals: 4 weeks   Pt will be independent with the initial phase of their HEP in order to continue to make functional gains outside of physical therapy.  Pt's right buttock/hip pain will decrease to 4/10 even with exercise in order for him to tolerate higher level activities better.  Pt's right hip abduction strength will improve to 4/5 MMT score.  Pt's right hip extension strength will improve to 4/5 MMT score.  Pt's Modified Oswestry score will improve by 8% to demonstrate improved function.      Long Term Goals: 8 weeks   Pt will be independent with the final phase of their HEP in order to continue to make functional gains outside of physical therapy.  Pt's right buttock/hip pain will decrease to 2/10 even with exercise in order for him to tolerate higher level activities better.  Pt's right hip abduction strength will improve to 4+/5 MMT score.  Pt's bilateral hip extension strength will improve to 4+/5 MMT score.  Pt's bilateral hip adduction strength will improve to 4/5 MMT score.    Pt's Modified Oswestry score will improve by 16% to demonstrate improved function    PLAN     Continue current POC with emphasis on spinal stability and dynamic control.     Leandra Smith, PTA

## 2024-03-12 ENCOUNTER — PATIENT MESSAGE (OUTPATIENT)
Dept: ADMINISTRATIVE | Facility: HOSPITAL | Age: 49
End: 2024-03-12
Payer: MEDICAID

## 2024-03-13 ENCOUNTER — OFFICE VISIT (OUTPATIENT)
Dept: ORTHOPEDICS | Facility: CLINIC | Age: 49
End: 2024-03-13
Payer: MEDICAID

## 2024-03-13 VITALS — WEIGHT: 210 LBS | BODY MASS INDEX: 26.95 KG/M2 | HEIGHT: 74 IN

## 2024-03-13 DIAGNOSIS — M51.36 LUMBAR DEGENERATIVE DISC DISEASE: ICD-10-CM

## 2024-03-13 DIAGNOSIS — M54.16 LUMBAR RADICULOPATHY: ICD-10-CM

## 2024-03-13 DIAGNOSIS — M54.50 ACUTE RIGHT-SIDED LOW BACK PAIN, UNSPECIFIED WHETHER SCIATICA PRESENT: Primary | ICD-10-CM

## 2024-03-13 DIAGNOSIS — M51.36 DISC DEGENERATION, LUMBAR: ICD-10-CM

## 2024-03-13 PROCEDURE — 1160F RVW MEDS BY RX/DR IN RCRD: CPT | Mod: CPTII,S$GLB,, | Performed by: ORTHOPAEDIC SURGERY

## 2024-03-13 PROCEDURE — 99213 OFFICE O/P EST LOW 20 MIN: CPT | Mod: S$GLB,,, | Performed by: ORTHOPAEDIC SURGERY

## 2024-03-13 PROCEDURE — 3008F BODY MASS INDEX DOCD: CPT | Mod: CPTII,S$GLB,, | Performed by: ORTHOPAEDIC SURGERY

## 2024-03-13 PROCEDURE — 1159F MED LIST DOCD IN RCRD: CPT | Mod: CPTII,S$GLB,, | Performed by: ORTHOPAEDIC SURGERY

## 2024-03-13 RX ORDER — IBUPROFEN 200 MG
200 TABLET ORAL EVERY 6 HOURS PRN
COMMUNITY
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
## 2024-03-18 ENCOUNTER — PATIENT OUTREACH (OUTPATIENT)
Dept: ADMINISTRATIVE | Facility: HOSPITAL | Age: 49
End: 2024-03-18
Payer: MEDICAID

## 2024-03-18 DIAGNOSIS — Z12.11 COLON CANCER SCREENING: Primary | ICD-10-CM

## 2024-03-18 NOTE — PROGRESS NOTES
Population Health Chart Review & Patient Outreach Details      Additional [facility] Health Notes:               Updates Requested / Reviewed:      Care Everywhere and Care Team Updated         Health Maintenance Topics Overdue:      VBHM Score: 3     Colon Cancer Screening  Uncontrolled BP  Hemoglobin A1c                       Health Maintenance Topic(s) Outreach Outcomes & Actions Taken:    Colorectal Cancer Screening - Outreach Outcomes & Actions Taken  : Colonoscopy Case Request / Referral / Home Test Order Placed and sent to [facility]

## 2024-03-19 ENCOUNTER — PATIENT MESSAGE (OUTPATIENT)
Dept: ORTHOPEDICS | Facility: CLINIC | Age: 49
End: 2024-03-19

## 2024-03-26 ENCOUNTER — HOSPITAL ENCOUNTER (OUTPATIENT)
Dept: RADIOLOGY | Facility: HOSPITAL | Age: 49
Discharge: HOME | End: 2024-03-26
Attending: ORTHOPAEDIC SURGERY
Payer: MEDICAID

## 2024-03-26 DIAGNOSIS — M51.36 DISC DEGENERATION, LUMBAR: ICD-10-CM

## 2024-03-26 DIAGNOSIS — M54.16 LUMBAR RADICULOPATHY: ICD-10-CM

## 2024-03-26 DIAGNOSIS — M51.36 LUMBAR DEGENERATIVE DISC DISEASE: ICD-10-CM

## 2024-03-26 PROCEDURE — 72148 MRI LUMBAR SPINE W/O DYE: CPT | Mod: TC

## 2024-04-01 ENCOUNTER — OFFICE VISIT (OUTPATIENT)
Dept: ORTHOPEDICS | Facility: CLINIC | Age: 49
End: 2024-04-01
Payer: MEDICAID

## 2024-04-01 VITALS — HEIGHT: 74 IN | BODY MASS INDEX: 26.97 KG/M2 | WEIGHT: 210.13 LBS

## 2024-04-01 DIAGNOSIS — M47.816 FACET ARTHRITIS, DEGENERATIVE, LUMBAR SPINE: ICD-10-CM

## 2024-04-01 DIAGNOSIS — M51.36 LUMBAR DEGENERATIVE DISC DISEASE: ICD-10-CM

## 2024-04-01 DIAGNOSIS — M54.16 LUMBAR RADICULOPATHY: Primary | ICD-10-CM

## 2024-04-01 PROCEDURE — 99213 OFFICE O/P EST LOW 20 MIN: CPT | Mod: S$GLB,,, | Performed by: ORTHOPAEDIC SURGERY

## 2024-04-01 PROCEDURE — 1159F MED LIST DOCD IN RCRD: CPT | Mod: CPTII,S$GLB,, | Performed by: ORTHOPAEDIC SURGERY

## 2024-04-01 PROCEDURE — 1160F RVW MEDS BY RX/DR IN RCRD: CPT | Mod: CPTII,S$GLB,, | Performed by: ORTHOPAEDIC SURGERY

## 2024-04-01 PROCEDURE — 3008F BODY MASS INDEX DOCD: CPT | Mod: CPTII,S$GLB,, | Performed by: ORTHOPAEDIC SURGERY
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
## 2024-04-03 ENCOUNTER — PATIENT MESSAGE (OUTPATIENT)
Dept: ORTHOPEDICS | Facility: CLINIC | Age: 49
End: 2024-04-03

## 2024-04-03 DIAGNOSIS — M47.816 FACET ARTHRITIS, DEGENERATIVE, LUMBAR SPINE: ICD-10-CM

## 2024-04-03 DIAGNOSIS — M51.36 LUMBAR DEGENERATIVE DISC DISEASE: ICD-10-CM

## 2024-04-03 DIAGNOSIS — M54.16 LUMBAR RADICULOPATHY: Primary | ICD-10-CM

## 2024-04-04 DIAGNOSIS — M47.816 FACET ARTHRITIS, DEGENERATIVE, LUMBAR SPINE: ICD-10-CM

## 2024-04-04 DIAGNOSIS — M54.16 LUMBAR RADICULOPATHY: Primary | ICD-10-CM

## 2024-04-04 RX ORDER — HYDROCODONE BITARTRATE AND ACETAMINOPHEN 5; 325 MG/1; MG/1
1 TABLET ORAL EVERY 6 HOURS PRN
Qty: 28 TABLET | Refills: 0 | Status: SHIPPED | OUTPATIENT
Start: 2024-04-04 | End: 2024-04-04

## 2024-04-04 RX ORDER — GABAPENTIN 300 MG/1
CAPSULE ORAL
Qty: 90 CAPSULE | Refills: 2 | Status: SHIPPED | OUTPATIENT
Start: 2024-04-04

## 2024-04-04 NOTE — TELEPHONE ENCOUNTER
Received call from [facility]. [place] 5 is out of stock, but [place] 7.5 is available. Message left for patient to return my call.

## 2024-04-04 NOTE — TELEPHONE ENCOUNTER
----- Message from Haven Colin sent at 4/4/2024 11:22 AM CDT -----  Patient returned your call.

## 2024-04-05 RX ORDER — HYDROCODONE BITARTRATE AND ACETAMINOPHEN 7.5; 325 MG/1; MG/1
1 TABLET ORAL EVERY 6 HOURS PRN
Qty: 28 TABLET | Refills: 0 | Status: SHIPPED | OUTPATIENT
Start: 2024-04-05 | End: 2024-04-08

## 2024-04-08 DIAGNOSIS — M51.36 LUMBAR DEGENERATIVE DISC DISEASE: ICD-10-CM

## 2024-04-08 DIAGNOSIS — M54.16 LUMBAR RADICULOPATHY: Primary | ICD-10-CM

## 2024-04-08 RX ORDER — TRAMADOL HYDROCHLORIDE 50 MG/1
50 TABLET ORAL EVERY 8 HOURS PRN
Qty: 21 TABLET | Refills: 0 | Status: SHIPPED | OUTPATIENT
Start: 2024-04-08 | End: 2024-04-15

## 2024-04-08 NOTE — TELEPHONE ENCOUNTER
Patient requesting non-narcotic pain medication in previous encounter.    April 7, 2024  Augustine Boswell III   to MARCELA Singh Staff (supporting Francisco Chun MD)         4/7/24  5:37 PM  The new medication is giving me some relief thank you. Was wondering if its possible to try tramadol I think thats a non narcotic option

## 2024-04-14 LAB — NONINV COLON CA DNA+OCC BLD SCRN STL QL: NEGATIVE

## 2024-04-15 ENCOUNTER — PATIENT MESSAGE (OUTPATIENT)
Dept: FAMILY MEDICINE | Facility: CLINIC | Age: 49
End: 2024-04-15
Payer: MEDICAID

## 2024-05-03 RX ORDER — LISINOPRIL 40 MG/1
40 TABLET ORAL
Qty: 90 TABLET | Refills: 1 | OUTPATIENT
Start: 2024-05-03

## 2024-05-03 NOTE — TELEPHONE ENCOUNTER
LOV. 12/27/23.      I dont need the antidepressants just the lisinopril. Do I need to come in for that?

## 2024-05-08 RX ORDER — LISINOPRIL 40 MG/1
40 TABLET ORAL DAILY
Qty: 90 TABLET | Refills: 0 | Status: SHIPPED | OUTPATIENT
Start: 2024-05-08 | End: 2025-05-08

## 2024-05-20 ENCOUNTER — OFFICE VISIT (OUTPATIENT)
Dept: URGENT CARE | Facility: CLINIC | Age: 49
End: 2024-05-20
Payer: MEDICAID

## 2024-05-20 VITALS
WEIGHT: 215.63 LBS | TEMPERATURE: 98 F | SYSTOLIC BLOOD PRESSURE: 143 MMHG | RESPIRATION RATE: 20 BRPM | HEART RATE: 88 BPM | OXYGEN SATURATION: 97 % | DIASTOLIC BLOOD PRESSURE: 91 MMHG | BODY MASS INDEX: 27.67 KG/M2 | HEIGHT: 74 IN

## 2024-05-20 DIAGNOSIS — S93.601A SPRAIN OF RIGHT FOOT, INITIAL ENCOUNTER: Primary | ICD-10-CM

## 2024-05-20 DIAGNOSIS — R52 PAIN: ICD-10-CM

## 2024-05-20 PROCEDURE — 73630 X-RAY EXAM OF FOOT: CPT | Mod: RT,S$GLB,, | Performed by: RADIOLOGY

## 2024-05-20 PROCEDURE — 99213 OFFICE O/P EST LOW 20 MIN: CPT | Mod: S$GLB,,, | Performed by: PHYSICIAN ASSISTANT

## 2024-05-20 RX ORDER — KETOROLAC TROMETHAMINE 30 MG/ML
30 INJECTION, SOLUTION INTRAMUSCULAR; INTRAVENOUS
Status: COMPLETED | OUTPATIENT
Start: 2024-05-20 | End: 2024-05-20

## 2024-05-20 RX ORDER — DICLOFENAC SODIUM 10 MG/G
2 GEL TOPICAL 4 TIMES DAILY
Qty: 20 G | Refills: 0 | Status: SHIPPED | OUTPATIENT
Start: 2024-05-20 | End: 2024-05-27

## 2024-05-20 RX ORDER — PREDNISONE 20 MG/1
20 TABLET ORAL DAILY
Qty: 5 TABLET | Refills: 0 | Status: SHIPPED | OUTPATIENT
Start: 2024-05-20 | End: 2024-05-25

## 2024-05-20 RX ADMIN — KETOROLAC TROMETHAMINE 30 MG: 30 INJECTION, SOLUTION INTRAMUSCULAR; INTRAVENOUS at 10:05

## 2024-05-20 NOTE — PROGRESS NOTES
"Subjective:      Patient ID: Augustine Boswell III is a 49 y.o. male.    Vitals:  height is 6' 2" (1.88 m) and weight is 97.8 kg (215 lb 9.6 oz). His oral temperature is 98 °F (36.7 °C). His blood pressure is 143/91 (abnormal) and his pulse is 88. His respiration is 20 and oxygen saturation is 97%.     Chief Complaint: Foot Injury (Upper right foot)    Patient says sometime Friday evening he noticed sharp pain while at home to his Right upper foot. Pain level 10/10. Pt wants xray taken.    Foot Injury   The incident occurred 2 days ago. The incident occurred at home. Associated symptoms include an inability to bear weight and a loss of motion. Pertinent negatives include no loss of sensation, muscle weakness, numbness or tingling. It is unknown if a foreign body is present. The symptoms are aggravated by movement and weight bearing. He has tried ice for the symptoms. The treatment provided moderate relief.       Constitution: Negative for chills, sweating, fatigue and fever.   HENT:  Negative for ear pain, drooling, congestion, sore throat, trouble swallowing and voice change.    Neck: Negative for neck pain, neck stiffness and painful lymph nodes.   Cardiovascular:  Negative for chest pain, leg swelling, palpitations, sob on exertion and passing out.   Eyes:  Negative for eye discharge, eye itching, eye pain, eye redness and eyelid swelling.   Respiratory:  Negative for chest tightness, cough, sputum production, bloody sputum, shortness of breath, stridor and wheezing.    Gastrointestinal:  Negative for abdominal pain, abdominal bloating, nausea, vomiting, constipation, diarrhea and heartburn.   Genitourinary:  Negative for urine decreased.   Musculoskeletal:  Negative for joint pain, joint swelling, abnormal ROM of joint, pain with walking, muscle cramps and muscle ache.   Skin:  Negative for rash and hives.   Allergic/Immunologic: Negative for hives, itching and sneezing.   Neurological:  Negative for " dizziness, light-headedness, passing out, loss of balance, headaches, altered mental status, loss of consciousness, numbness and seizures.   Hematologic/Lymphatic: Negative for swollen lymph nodes.   Psychiatric/Behavioral:  Negative for altered mental status and nervous/anxious. The patient is not nervous/anxious.       Objective:     Physical Exam   Constitutional: He is oriented to person, place, and time. He appears well-developed. He is cooperative.   HENT:   Head: Normocephalic and atraumatic.   Ears:   Right Ear: Hearing, tympanic membrane, external ear and ear canal normal.   Left Ear: Hearing, tympanic membrane, external ear and ear canal normal.   Nose: Nose normal. No mucosal edema or nasal deformity. No epistaxis. Right sinus exhibits no maxillary sinus tenderness and no frontal sinus tenderness. Left sinus exhibits no maxillary sinus tenderness and no frontal sinus tenderness.   Mouth/Throat: Uvula is midline, oropharynx is clear and moist and mucous membranes are normal. No trismus in the jaw. Normal dentition. No uvula swelling.   Eyes: Conjunctivae and lids are normal.   Neck: Trachea normal and phonation normal. Neck supple.   Cardiovascular: Normal rate, regular rhythm, normal heart sounds and normal pulses.   Pulmonary/Chest: Effort normal and breath sounds normal.   Abdominal: Normal appearance and bowel sounds are normal. Soft.   Musculoskeletal: Normal range of motion.         General: Tenderness present. No signs of injury. Normal range of motion.      Comments: + ttp to right 2nd and 3rd metatarsal bones. No swelling or cellulitis noted   Neurological: He is alert and oriented to person, place, and time. He exhibits normal muscle tone.   Skin: Skin is warm, dry and intact.   Psychiatric: His speech is normal and behavior is normal. Judgment and thought content normal.   Nursing note and vitals reviewed.    XR FOOT COMPLETE 3 VIEW RIGHT    Result Date: 5/20/2024  EXAMINATION: XR FOOT COMPLETE  3 VIEW RIGHT CLINICAL HISTORY: . Pain, unspecified TECHNIQUE: AP, lateral, and oblique views of the right foot were performed. COMPARISON: None FINDINGS: No acute fracture or dislocation.  There is a small enthesophytes at site of insertion of the Achilles tendon.     As above Electronically signed by: Melani Jones MD Date:    05/20/2024 Time:    10:11  Assessment:     1. Sprain of right foot, initial encounter    2. Pain        Plan:       Sprain of right foot, initial encounter    Pain  -     XR FOOT COMPLETE 3 VIEW RIGHT; Future; Expected date: 05/20/2024    Other orders  -     ketorolac injection 30 mg  -     predniSONE (DELTASONE) 20 MG tablet; Take 1 tablet (20 mg total) by mouth once daily. for 5 days  Dispense: 5 tablet; Refill: 0  -     diclofenac sodium (VOLTAREN) 1 % Gel; Apply 2 g topically 4 (four) times daily. for 7 days  Dispense: 20 g; Refill: 0      Patient Instructions   INSTRUCTIONS:  - Rest.  - Drink plenty of fluids.  - Take Tylenol and/or Ibuprofen as directed as needed for fever/pain.  Do not take more than the recommended dose.  - follow up with your PCP within the next 1-2 weeks as needed.  - You must understand that you have received an Urgent Care treatment only and that you may be released before all of your medical problems are known or treated.   - You, the patient, will arrange for follow up care as instructed.   - If your condition worsens or fails to improve we recommend that you receive another evaluation at the ER immediately or contact your PCP to discuss your concerns.   - You can call [phone] or [phone] to help schedule an appointment with the appropriate provider.     -If you smoke cigarettes, it would be beneficial for you to stop.

## 2024-05-20 NOTE — PATIENT INSTRUCTIONS

## 2024-06-07 NOTE — OP NOTE
[facility]  Surgery Department  Operative Note    SUMMARY     Date of Procedure: 10/4/2023     Procedure:  Lateral epicondylar release right elbow                       Common extensor tendon repair right elbow    Surgeon(s) and Role:     * Gian Gonsalves MD - Primary    Assisting Surgeon:  Ajith    Pre-Operative Diagnosis: Lateral epicondylitis, right elbow [M77.11]    Post-Operative Diagnosis: Post-Op Diagnosis Codes:     * Lateral epicondylitis, right elbow [M77.11]    Anesthesia: Local MAC    Description of the Findings of the Procedure:  Patient was placed on the operative table in supine position.  The upper extremity was prepped and draped in the usual sterile manner for surgery.  Incision was made directly over the lateral epicondylar region towards the radial head.  It was carried down sharply through the skin.  The interval between the extensor carpi radialis longus and the extensor digitorum was identified and opened.  There was complete detachment at the area of the ECRB.  The CRP was necrotic and gray looking.  It was excised.  The lateral epicondylar region was freshened with a rongeur.  I now closed the common extensor defect with a 1. Vicryl.  We copiously irrigated.  We closed the subQ with 3-0 Vicryl and the skin with 4-0 Monocryl.  Sterile dressings were applied and the patient was noted to be in stable condition    Complications: No    Estimated Blood Loss (EBL): * No values recorded between 10/4/2023 11:12 AM and 10/4/2023 11:21 AM *           Implants: * No implants in log *    Specimens:   Specimen (24h ago, onward)      None                    Condition: Good    Disposition: PACU - hemodynamically stable.              Discharge Note    SUMMARY     Admit Date: 10/4/2023    Discharge Date and Time:  10/04/2023 11:21 AM    Hospital Course (synopsis of major diagnoses, care, treatment, and services provided during the course of the hospital stay): Uneventful      Final Diagnosis:  Post-Op Diagnosis Codes:     * Lateral epicondylitis, right elbow [M77.11]    Disposition: Home or Self Care    Follow Up/Patient Instructions:     Medications:  Reconciled Home Medications:   Current Discharge Medication List        CONTINUE these medications which have NOT CHANGED    Details   APPLE CIDER VINEGAR ORAL Take 10 mLs by mouth Daily.      elderberry fruit 50 mg/5 mL Syrp Take 5 mLs by mouth Daily.      multivit-mins no.63/iron/folic (M-VIT ORAL) Take by mouth once daily.      naproxen (NAPROSYN) 500 MG tablet Take 1 tablet (500 mg total) by mouth 2 (two) times daily with meals.  Qty: 60 tablet, Refills: 0    Associated Diagnoses: Partial tear of common extensor tendon of left elbow; Lateral epicondylitis, left elbow      omeprazole (PRILOSEC) 20 MG capsule Take 20 mg by mouth once daily. PRN GERD      traMADoL (ULTRAM) 50 mg tablet Take 1 tablet (50 mg total) by mouth every 6 (six) hours as needed for Pain.  Qty: 28 tablet, Refills: 0    Comments: Quantity prescribed more than 7 day supply? No  Associated Diagnoses: Partial tear of common extensor tendon of left elbow; Lateral epicondylitis, left elbow           Discharge Procedure Orders   Diet Adult Regular     Leave dressing on - Keep it clean, dry, and intact until clinic visit     Activity as tolerated        Xray Foot AP + Lateral + Oblique, Right

## 2024-08-04 NOTE — PROGRESS NOTES
SUBJECTIVE:    Patient ID: Augustine Boswell is a 45 y.o. male.    Chief Complaint: Anxiety (1 month f/u) and Hypertension    Betito presents today for hypertension and hepatitis follow-up.  He has established with hepatology clinic and had a visit yesterday.  Currently awaiting insurance clearance to began treatment for his hepatitis-C.  States that this is a 6 month process and after completing treatment he will undergo repeat testing about 12 weeks later as a test of cure.  He has no questions or concerns about the hepatitis clinic or his treatment plan.    His blood pressure is still mildly elevated today.  Blood pressure was also elevated at hepatitis clinic yesterday on chart review.  States that he has been taking his blood pressure medicine every day.  He is agreeable to increase of medication today with reassessment in about a month.  He also complains of uncontrolled anxiety.  However, he has concern of starting a medication that will interact with his hepatitis treatment.  Discussed with patient that this provider would reach out to his hepatitis clinician to give recommendations about which medications started for his anxiety.  He became more reassured after this as he does not want anything to interfere with his hepatitis treatment.  Once recommendations are received will reach out to patient and discuss his options.  All questions and concerns addressed.  Strongly encouraged patient to stop smoking.  However, he is not yet ready to do so.      Lab Visit on 11/05/2020   Component Date Value Ref Range Status    HIV 1/2 Ag/Ab 11/05/2020 Negative  Negative Final    HCV Log 11/05/2020 5.68* <1.08 Log (10) IU/mL Final    HCV, Qualitative 11/05/2020 DETECTED* Not detected IU/mL Final    HCV RNA Quant PCR 11/05/2020 476,355* <12 IU/mL Final    Hepatitis B Surface Ag 11/05/2020 Negative  Negative Final    Hep B S Ab 11/05/2020 Positive*  Final    Hep B Core Total Ab 11/05/2020 Positive*  Final    Hepatitis  Yes A Antibody IgG 11/05/2020 Positive*  Final   Office Visit on 10/13/2020   Component Date Value Ref Range Status    Cholesterol 10/15/2020 135  100 - 199 mg/dL Final    Triglycerides 10/15/2020 96  0 - 149 mg/dL Final    HDL 10/15/2020 38* >39 mg/dL Final    VLDL Cholesterol [place] 10/15/2020 18  5 - 40 mg/dL Final    LDL Calculated 10/15/2020 79  0 - 99 mg/dL Final    Glucose 10/15/2020 98  65 - 99 mg/dL Final    BUN 10/15/2020 15  6 - 24 mg/dL Final    Creatinine 10/15/2020 1.17  0.76 - 1.27 mg/dL Final    eGFR if non African American 10/15/2020 75  >59 mL/min/1.73 Final    eGFR if African American 10/15/2020 87  >59 mL/min/1.73 Final    BUN/Creatinine Ratio 10/15/2020 13  9 - 20 Final    Sodium 10/15/2020 140  134 - 144 mmol/L Final    Potassium 10/15/2020 4.9  3.5 - 5.2 mmol/L Final    Chloride 10/15/2020 104  96 - 106 mmol/L Final    CO2 10/15/2020 23  20 - 29 mmol/L Final    Calcium 10/15/2020 9.7  8.7 - 10.2 mg/dL Final    Protein, Total 10/15/2020 7.3  6.0 - 8.5 g/dL Final    Albumin 10/15/2020 4.7  4.0 - 5.0 g/dL Final    Globulin, Total 10/15/2020 2.6  1.5 - 4.5 g/dL Final    Albumin/Globulin Ratio 10/15/2020 1.8  1.2 - 2.2 Final    Total Bilirubin 10/15/2020 0.4  0.0 - 1.2 mg/dL Final    Alkaline Phosphatase 10/15/2020 65  39 - 117 IU/L Final    AST 10/15/2020 35  0 - 40 IU/L Final    ALT 10/15/2020 50* 0 - 44 IU/L Final    WBC 10/15/2020 5.2  3.4 - 10.8 x10E3/uL Final    RBC 10/15/2020 5.94* 4.14 - 5.80 x10E6/uL Final    Hemoglobin 10/15/2020 17.2  13.0 - 17.7 g/dL Final    Hematocrit 10/15/2020 53.8* 37.5 - 51.0 % Final    MCV 10/15/2020 91  79 - 97 fL Final    MCH 10/15/2020 29.0  26.6 - 33.0 pg Final    MCHC 10/15/2020 32.0  31.5 - 35.7 g/dL Final    RDW 10/15/2020 13.8  11.6 - 15.4 % Final    Platelets 10/15/2020 221  150 - 450 x10E3/uL Final    Neutrophils 10/15/2020 59  Not Estab. % Final    Lymphs 10/15/2020 28  Not Estab. % Final    Monocytes 10/15/2020 9  Not  Estab. % Final    Eos 10/15/2020 3  Not Estab. % Final    Basos 10/15/2020 1  Not Estab. % Final    Neutrophils (Absolute) 10/15/2020 3.2  1.4 - 7.0 x10E3/uL Final    Lymphs (Absolute) 10/15/2020 1.4  0.7 - 3.1 x10E3/uL Final    Monocytes(Absolute) 10/15/2020 0.5  0.1 - 0.9 x10E3/uL Final    Eos (Absolute) 10/15/2020 0.1  0.0 - 0.4 x10E3/uL Final    Baso (Absolute) 10/15/2020 0.0  0.0 - 0.2 x10E3/uL Final    Immature Granulocytes 10/15/2020 0  Not Estab. % Final    Immature Grans (Abs) 10/15/2020 0.0  0.0 - 0.1 x10E3/uL Final    Hep C Virus Ab Signal/Cutoff 10/15/2020 >11.0* 0.0 - 0.9 s/co ratio Final    TSH 10/15/2020 0.876  0.450 - 4.500 uIU/mL Final   Clinical Support on 06/27/2020   Component Date Value Ref Range Status    SARS-CoV2 (COVID-19) Qualitative P* 06/27/2020 Not Detected  Not Detected Final       Past Medical History:   Diagnosis Date    Allergy     Asthma     Chronic hepatitis C     GERD (gastroesophageal reflux disease)      Social History     Socioeconomic History    Marital status:      Spouse name: Not on file    Number of children: Not on file    Years of education: Not on file    Highest education level: Not on file   Occupational History    Not on file   Social Needs    Financial resource strain: Not on file    Food insecurity     Worry: Not on file     Inability: Not on file    Transportation needs     Medical: Not on file     Non-medical: Not on file   Tobacco Use    Smoking status: Current Every Day Smoker    Smokeless tobacco: Current User     Types: Chew   Substance and Sexual Activity    Alcohol use: No    Drug use: Not Currently     Types: Methamphetamines, Cocaine, Heroin     Comment: Heroin-Gram/day has not used x 3 days    Sexual activity: Not Currently   Lifestyle    Physical activity     Days per week: Not on file     Minutes per session: Not on file    Stress: Not on file   Relationships    Social connections     Talks on phone: Not on file  "    Gets together: Not on file     Attends Muslim service: Not on file     Active member of club or organization: Not on file     Attends meetings of clubs or organizations: Not on file     Relationship status: Not on file   Other Topics Concern    Not on file   Social History Narrative    Not on file     Past Surgical History:   Procedure Laterality Date    FRACTURE SURGERY      right hand     History reviewed. No pertinent family history.    Review of patient's allergies indicates:  No Known Allergies    Current Outpatient Medications:     ibuprofen (ADVIL,MOTRIN) 200 MG tablet, Take 200 mg by mouth every 6 (six) hours as needed for Pain., Disp: , Rfl:     lisinopriL 10 MG tablet, Take 2 tablets (20 mg total) by mouth once daily., Disp: 60 tablet, Rfl: 0    multivit-mins no.63/iron/folic (M-VIT ORAL), Take by mouth once daily., Disp: , Rfl:     omeprazole (PRILOSEC) 20 MG capsule, Take 20 mg by mouth once daily. PRN GERD, Disp: , Rfl:     traMADoL (ULTRAM) 50 mg tablet, Take 50 mg by mouth every 6 (six) hours., Disp: , Rfl:     sofosbuvir-velpatasvir 400-100 mg Tab, Take 1 tablet by mouth once daily. (Patient not taking: Reported on 11/17/2020), Disp: 28 tablet, Rfl: 2    Review of Systems   Constitutional: Negative for activity change, appetite change, chills, fatigue and fever.   HENT: Negative for congestion, nosebleeds, postnasal drip, rhinorrhea, sneezing and sore throat.    Cardiovascular: Negative for leg swelling.   Gastrointestinal: Negative for abdominal distention, abdominal pain, diarrhea and vomiting.   Genitourinary: Negative for dysuria, frequency and urgency.   Musculoskeletal: Negative for arthralgias.          Objective:      Vitals:    11/17/20 0734   BP: (!) 142/94   Pulse: 66   Temp: 97.2 °F (36.2 °C)   SpO2: 98%   Weight: 103.1 kg (227 lb 3.2 oz)   Height: 6' 2" (1.88 m)     Physical Exam  Vitals signs and nursing note reviewed.   Constitutional:       General: He is not in acute " distress.     Appearance: Normal appearance. He is not ill-appearing or toxic-appearing.   HENT:      Head: Normocephalic and atraumatic.      Right Ear: External ear normal.      Left Ear: External ear normal.   Eyes:      General: No scleral icterus.        Right eye: No discharge.         Left eye: No discharge.      Extraocular Movements: Extraocular movements intact.      Conjunctiva/sclera: Conjunctivae normal.      Pupils: Pupils are equal, round, and reactive to light.   Neck:      Musculoskeletal: Normal range of motion.   Cardiovascular:      Rate and Rhythm: Normal rate and regular rhythm.      Pulses: Normal pulses.      Heart sounds: Normal heart sounds. No murmur.   Pulmonary:      Effort: Pulmonary effort is normal. No respiratory distress.      Breath sounds: Normal breath sounds. No decreased breath sounds or wheezing.   Abdominal:      General: Bowel sounds are normal. There is no distension.      Palpations: Abdomen is soft.      Tenderness: There is no abdominal tenderness. There is no guarding or rebound.   Musculoskeletal: Normal range of motion.   Skin:     General: Skin is warm and dry.   Neurological:      General: No focal deficit present.      Mental Status: He is alert and oriented to person, place, and time.      Motor: No tremor.   Psychiatric:         Mood and Affect: Mood is anxious.         Behavior: Behavior normal.         Thought Content: Thought content normal.         Judgment: Judgment normal.           Assessment:       1. Essential hypertension    2. Anxiety    3. Transaminitis    4. Chronic hepatitis C without hepatic coma         Plan:       Essential hypertension  Comments:  BP improved however still elevated  Increase Lisinopril to 20mg  Reassess in one month  Orders:  -     lisinopriL 10 MG tablet; Take 2 tablets (20 mg total) by mouth once daily.  Dispense: 60 tablet; Refill: 0    Anxiety  Comments:  Confer with hepatologist about starting anxiety  medication    Transaminitis    Chronic hepatitis C without hepatic coma  Comments:  Continue follow-up in hepatology Clinic  Plan to start therapy for hepatitis once insurance clearance is received      Follow up for HTN, anxiety.        11/17/2020 Elizabeth Martel M.D.

## 2025-08-20 ENCOUNTER — PATIENT MESSAGE (OUTPATIENT)
Dept: ADMINISTRATIVE | Facility: HOSPITAL | Age: 50
End: 2025-08-20
Payer: MEDICAID

## (undated) DEVICE — SUTURE ETHILON 4-0 PS-2 18 1667H

## (undated) DEVICE — BANDAGE ESMARK 4X9 55514

## (undated) DEVICE — BLADE SCALPEL #15 371115

## (undated) DEVICE — CUFF TOURNIQUET 18DUAL PRT 5921-218-235

## (undated) DEVICE — SUTURE VICRYL 2-0 CT-1 36 VCP945H

## (undated) DEVICE — ADHESIVE MASTISOL VIAL 0523-48

## (undated) DEVICE — SOLUTION IRRI NS BOTTLE 1000ML R5200-01

## (undated) DEVICE — DRAPE CLEAR SMALL 1000

## (undated) DEVICE — PAD BOVIE ADULT

## (undated) DEVICE — BANDAGE COBAN 4 TAN

## (undated) DEVICE — PADDING CAST 4 STERILE 30-321

## (undated) DEVICE — SPONGE GAUZE 10S 4X4  442214

## (undated) DEVICE — UNDERGLOVE BIOGEL MICRO BLUE SZ 8.5

## (undated) DEVICE — STERISTRIP 1/2 R1547

## (undated) DEVICE — SUTURE MONOCRYL 3-0 27 PS-1 MCP936H

## (undated) DEVICE — Device

## (undated) DEVICE — GLOVE BIOGEL PI GOLD SZ  8.5

## (undated) DEVICE — TRAY UPPER EXTREMITY

## (undated) DEVICE — SLING ARM LARGE BLUE

## (undated) DEVICE — STOCKINETTE IMPERVIOUS 12X48 STK6248

## (undated) DEVICE — BANDAGE ACE STERILE 4 REB3114

## (undated) DEVICE — SUTURE VICRYL 3-0 SH 27 VCP416H

## (undated) DEVICE — SUTURE VICRYL 3-0 RB-1 27 J215H